# Patient Record
Sex: FEMALE | Race: WHITE | NOT HISPANIC OR LATINO | Employment: OTHER | URBAN - NONMETROPOLITAN AREA
[De-identification: names, ages, dates, MRNs, and addresses within clinical notes are randomized per-mention and may not be internally consistent; named-entity substitution may affect disease eponyms.]

---

## 2017-04-24 ENCOUNTER — TRANSFERRED RECORDS (OUTPATIENT)
Dept: HEALTH INFORMATION MANAGEMENT | Facility: HOSPITAL | Age: 77
End: 2017-04-24

## 2017-04-27 ENCOUNTER — OFFICE VISIT (OUTPATIENT)
Dept: SLEEP MEDICINE | Facility: HOSPITAL | Age: 77
End: 2017-04-27
Payer: MEDICARE

## 2017-04-27 DIAGNOSIS — R09.02 HYPOXIA: Primary | ICD-10-CM

## 2017-04-27 PROCEDURE — 94762 N-INVAS EAR/PLS OXIMTRY CONT: CPT

## 2017-04-27 NOTE — MR AVS SNAPSHOT
"              After Visit Summary   2017    Katya Alcala    MRN: [id number]           Patient Information     Date Of Birth          1940        Visit Information        Provider Department      2017 10:30 AM [facility]        Today's Diagnoses     Hypoxia    -  1       Follow-ups after your visit        Who to contact     If you have questions or need follow up information about today's clinic visit or your schedule please contact [facility] directly at [phone].  Normal or non-critical lab and imaging results will be communicated to you by [employer], letter or phone within 4 business days after the clinic has received the results. If you do not hear from us within 7 days, please contact the clinic through [employer] or phone. If you have a critical or abnormal lab result, we will notify you by phone as soon as possible.  Submit refill requests through [employer] or call your pharmacy and they will forward the refill request to us. Please allow 3 business days for your refill to be completed.          Additional Information About Your Visit        [employer]AL[employer] Information     [employer] lets you send messages to your doctor, view your test results, renew your prescriptions, schedule appointments and more. To sign up, go to [email or web address] . Click on \"Log in\" on the left side of the screen, which will take you to the Welcome page. Then click on \"Sign up Now\" on the right side of the page.     You will be asked to enter the access code listed below, as well as some personal information. Please follow the directions to create your username and password.     Your access code is: [email or web address]  Expires: 2017  9:33 AM     Your access code will  in 90 days. If you need help or a new code, please call your [place] clinic or [phone].        Care EveryWhere ID     This is your Care EveryWhere ID. This could be used by other organizations to access your [place] medical " records  [id number]         Blood Pressure from Last 3 Encounters:   08/08/16 152/71   02/08/14 (!) 118/46    Weight from Last 3 Encounters:   08/08/16 144 lb (65.3 kg)   02/07/14 145 lb 9.6 oz (66 kg)              Today, you had the following     No orders found for display       Primary Care Provider Office Phone # Fax #    Lokesh Taylor [phone]       [facility] [place] [address] [place] [id number]        Thank you!     Thank you for choosing [facility]  for your care. Our goal is always to provide you with excellent care. Hearing back from our patients is one way we can continue to improve our services. Please take a few minutes to complete the written survey that you may receive in the mail after your visit with us. Thank you!             Your Updated Medication List - Protect others around you: Learn how to safely use, store and throw away your medicines at www.disposemymeds.org.          This list is accurate as of: 4/27/17 11:59 PM.  Always use your most recent med list.                   Brand Name Dispense Instructions for use    ALLOPURINOL PO      Take 100 mg by mouth daily       AMLODIPINE BESYLATE PO      Take 10 mg by mouth daily       ASPIRIN PO      Take 81 mg by mouth       isosorbide mononitrate 30 MG 24 hr tablet    IMDUR     Take 30 mg by mouth daily       LISINOPRIL PO      Take 20 mg by mouth daily       NADOLOL PO      Take 80 mg by mouth daily       OMEGA-3 FISH OIL PO      Take 2 g by mouth daily       PRAVASTATIN SODIUM PO      Take 10 mg by mouth daily       PRILOSEC PO      Take 20 mg by mouth daily       TRAMADOL HCL PO      Take 50 mg by mouth daily

## 2017-04-27 NOTE — NURSING NOTE
Patient picked up over night oximeter number [id number]. Patient was instructed on use of device. Patient verbalized understanding.     Patient will return device tomorrow by: noon

## 2017-04-28 NOTE — NURSING NOTE
Patient returned the wrist ox and the report was printed and fax to Dr. Liu and Dr. Taylor and sent to scan

## 2017-10-19 ENCOUNTER — RADIANT APPOINTMENT (OUTPATIENT)
Dept: MAMMOGRAPHY | Facility: OTHER | Age: 77
End: 2017-10-19
Attending: FAMILY MEDICINE
Payer: MEDICARE

## 2017-10-19 DIAGNOSIS — Z12.39 BREAST SCREENING: ICD-10-CM

## 2017-10-19 PROCEDURE — G0202 SCR MAMMO BI INCL CAD: HCPCS | Mod: TC

## 2017-11-26 ENCOUNTER — APPOINTMENT (OUTPATIENT)
Dept: GENERAL RADIOLOGY | Facility: HOSPITAL | Age: 77
End: 2017-11-26
Attending: FAMILY MEDICINE
Payer: MEDICARE

## 2017-11-26 ENCOUNTER — HOSPITAL ENCOUNTER (EMERGENCY)
Facility: HOSPITAL | Age: 77
Discharge: HOME | End: 2017-11-26
Attending: FAMILY MEDICINE | Admitting: FAMILY MEDICINE
Payer: MEDICARE

## 2017-11-26 VITALS
RESPIRATION RATE: 16 BRPM | SYSTOLIC BLOOD PRESSURE: 129 MMHG | OXYGEN SATURATION: 97 % | DIASTOLIC BLOOD PRESSURE: 60 MMHG | TEMPERATURE: 98.8 F

## 2017-11-26 DIAGNOSIS — M25.531 RIGHT WRIST PAIN: ICD-10-CM

## 2017-11-26 DIAGNOSIS — S62.001A CLOSED NONDISPLACED FRACTURE OF SCAPHOID OF RIGHT WRIST, UNSPECIFIED PORTION OF SCAPHOID, INITIAL ENCOUNTER: ICD-10-CM

## 2017-11-26 LAB
BASOPHILS # BLD AUTO: 0.1 10E9/L (ref 0–0.2)
BASOPHILS NFR BLD AUTO: 0.6 %
CRP SERPL-MCNC: 13.5 MG/L (ref 0–8)
D DIMER PPP DDU-MCNC: 223 NG/ML D-DU (ref 0–300)
DIFFERENTIAL METHOD BLD: ABNORMAL
EOSINOPHIL # BLD AUTO: 0.4 10E9/L (ref 0–0.7)
EOSINOPHIL NFR BLD AUTO: 4.3 %
ERYTHROCYTE [DISTWIDTH] IN BLOOD BY AUTOMATED COUNT: 14.1 % (ref 10–15)
HCT VFR BLD AUTO: 28 % (ref 35–47)
HGB BLD-MCNC: 9 G/DL (ref 11.7–15.7)
IMM GRANULOCYTES # BLD: 0 10E9/L (ref 0–0.4)
IMM GRANULOCYTES NFR BLD: 0.2 %
LYMPHOCYTES # BLD AUTO: 2.3 10E9/L (ref 0.8–5.3)
LYMPHOCYTES NFR BLD AUTO: 27.2 %
MCH RBC QN AUTO: 27.5 PG (ref 26.5–33)
MCHC RBC AUTO-ENTMCNC: 32.1 G/DL (ref 31.5–36.5)
MCV RBC AUTO: 86 FL (ref 78–100)
MONOCYTES # BLD AUTO: 0.7 10E9/L (ref 0–1.3)
MONOCYTES NFR BLD AUTO: 8.1 %
NEUTROPHILS # BLD AUTO: 4.9 10E9/L (ref 1.6–8.3)
NEUTROPHILS NFR BLD AUTO: 59.6 %
NRBC # BLD AUTO: 0 10*3/UL
NRBC BLD AUTO-RTO: 0 /100
PLATELET # BLD AUTO: 204 10E9/L (ref 150–450)
RBC # BLD AUTO: 3.27 10E12/L (ref 3.8–5.2)
URATE SERPL-MCNC: 4.2 MG/DL (ref 2.6–6)
WBC # BLD AUTO: 8.3 10E9/L (ref 4–11)

## 2017-11-26 PROCEDURE — 36415 COLL VENOUS BLD VENIPUNCTURE: CPT | Performed by: FAMILY MEDICINE

## 2017-11-26 PROCEDURE — 99284 EMERGENCY DEPT VISIT MOD MDM: CPT

## 2017-11-26 PROCEDURE — 99284 EMERGENCY DEPT VISIT MOD MDM: CPT | Performed by: FAMILY MEDICINE

## 2017-11-26 PROCEDURE — 73070 X-RAY EXAM OF ELBOW: CPT | Mod: TC,RT

## 2017-11-26 PROCEDURE — 85379 FIBRIN DEGRADATION QUANT: CPT | Performed by: FAMILY MEDICINE

## 2017-11-26 PROCEDURE — 86140 C-REACTIVE PROTEIN: CPT | Performed by: FAMILY MEDICINE

## 2017-11-26 PROCEDURE — 25000132 ZZH RX MED GY IP 250 OP 250 PS 637: Mod: GY

## 2017-11-26 PROCEDURE — 73110 X-RAY EXAM OF WRIST: CPT | Mod: TC,RT

## 2017-11-26 PROCEDURE — 85025 COMPLETE CBC W/AUTO DIFF WBC: CPT | Performed by: FAMILY MEDICINE

## 2017-11-26 PROCEDURE — 84550 ASSAY OF BLOOD/URIC ACID: CPT | Performed by: FAMILY MEDICINE

## 2017-11-26 PROCEDURE — A9270 NON-COVERED ITEM OR SERVICE: HCPCS | Mod: GY

## 2017-11-26 RX ORDER — ACETAMINOPHEN 325 MG/1
TABLET ORAL
Status: COMPLETED
Start: 2017-11-26 | End: 2017-11-26

## 2017-11-26 RX ADMIN — ACETAMINOPHEN 975 MG: 325 TABLET, FILM COATED ORAL at 22:16

## 2017-11-26 ASSESSMENT — ENCOUNTER SYMPTOMS
DIAPHORESIS: 0
ALLERGIC/IMMUNOLOGIC NEGATIVE: 1
ACTIVITY CHANGE: 0
APPETITE CHANGE: 0
MUSCULOSKELETAL NEGATIVE: 1
CARDIOVASCULAR NEGATIVE: 1
EYES NEGATIVE: 1
RESPIRATORY NEGATIVE: 1
FATIGUE: 0
UNEXPECTED WEIGHT CHANGE: 0
ENDOCRINE NEGATIVE: 1
FEVER: 0
CHILLS: 0
NEUROLOGICAL NEGATIVE: 1

## 2017-11-26 NOTE — ED AVS SNAPSHOT
[facility]    [address]    [place] [address]    Phone:  [phone]                                       Katya Alcala   MRN: [id number]    Department:  [facility]   Date of Visit:  11/26/2017           Patient Information     Date Of Birth          1940        Your diagnoses for this visit were:     Right wrist pain     Closed nondisplaced fracture of scaphoid of right wrist, unspecified portion of scaphoid, initial encounter        You were seen by Carissa Villa MD.      Follow-up Information     Please follow up.    Why:  orthopedics next week for recheck          Review of your medicines      Our records show that you are taking the medicines listed below. If these are incorrect, please call your family doctor or clinic.        Dose / Directions Last dose taken    ALLOPURINOL PO   Dose:  100 mg        Take 100 mg by mouth daily   Refills:  0        isosorbide mononitrate 30 MG 24 hr tablet   Commonly known as:  IMDUR   Dose:  30 mg        Take 30 mg by mouth daily   Refills:  0        LISINOPRIL PO   Dose:  20 mg        Take 20 mg by mouth daily   Refills:  0        NADOLOL PO   Dose:  80 mg        Take 80 mg by mouth daily   Refills:  0        NORVASC PO   Dose:  10 mg        Take 10 mg by mouth daily   Refills:  0        OMEGA-3 FISH OIL PO   Dose:  2 g        Take 2 g by mouth daily   Refills:  0        PRAVASTATIN SODIUM PO   Dose:  20 mg        Take 20 mg by mouth daily   Refills:  0        PRILOSEC PO   Dose:  20 mg        Take 20 mg by mouth daily   Refills:  0        TRAMADOL HCL PO   Dose:  50 mg        Take 50 mg by mouth daily   Refills:  0        XARELTO PO   Dose:  20 mg        Take 20 mg by mouth   Refills:  0                Procedures and tests performed during your visit     CBC with platelets differential    CRP inflammation    D-Dimer ([facility],)    Elbow XR, 2 views, right    Sling    Splint    Uric acid    Wrist XR, G/E 3 views,  "right      Orders Needing Specimen Collection     None      Pending Results     Date and Time Order Name Status Description    2017 2212 Elbow XR, 2 views, right In process     2017 2149 Wrist XR, G/E 3 views, right In process             Pending Culture Results     No orders found from 2017 to 2017.            Thank you for choosing [facility]       Thank you for choosing [facility] for your care. Our goal is always to provide you with excellent care. Hearing back from our patients is one way we can continue to improve our services. Please take a few minutes to complete the written survey that you may receive in the mail after you visit with us. Thank you!        [employer] Information     [employer] lets you send messages to your doctor, view your test results, renew your prescriptions, schedule appointments and more. To sign up, go to [email or web address] . Click on \"Log in\" on the left side of the screen, which will take you to the Welcome page. Then click on \"Sign up Now\" on the right side of the page.     You will be asked to enter the access code listed below, as well as some personal information. Please follow the directions to create your username and password.     Your access code is: [email or web address]  Expires: 2018 11:27 PM     Your access code will  in 90 days. If you need help or a new code, please call your [facility] clinic or [phone].        Care EveryWhere ID     This is your Care EveryWhere ID. This could be used by other organizations to access your [facility] medical records  [id number]        Equal Access to Services     [facility] HospitalBRYAN : Hadii shanique johno Soroxana, waaxda luqadaha, qaybta kaalmada adepio, yolanda medellin . So [facility] [phone].    ATENCIÓN: Si habla español, tiene a pastrana disposición servicios gratuitos de asistencia lingüística. Llame al [phone].    We comply with applicable federal civil rights laws and [place] laws. " We do not discriminate on the basis of race, color, national origin, age, disability, sex, sexual orientation, or gender identity.            After Visit Summary       This is your record. Keep this with you and show to your community pharmacist(s) and doctor(s) at your next visit.

## 2017-11-26 NOTE — ED AVS SNAPSHOT
[facility]    [address] [address]    Phone:  [phone]                                       Katya Alcala   MRN: [id number]    Department:  [facility]   Date of Visit:  11/26/2017           After Visit Summary Signature Page     I have received my discharge instructions, and my questions have been answered. I have discussed any challenges I see with this plan with the nurse or doctor.    ..........................................................................................................................................  Patient/Patient Representative Signature      ..........................................................................................................................................  Patient Representative Print Name and Relationship to Patient    ..................................................               ................................................  Date                                            Time    ..........................................................................................................................................  Reviewed by Signature/Title    ...................................................              ..............................................  Date                                                            Time

## 2017-11-27 NOTE — ED PROVIDER NOTES
"  History     Chief Complaint   Patient presents with     Arm Pain     pain and swelling to right arm from elbow down. denies injury. x3 days. CMS intact. denies numbness/tingling. states \"it just throbs\" hx of gout but states \"it doesn't really feel like gout.\" warmth noted. recently placed on xarelto     HPI  Katya Alcala is a 77 year old female who presents with right arm pain . States pain started 2 days ago during the night. By the time she woke up in the AM it has been achy and painful. Also swelling. NO known injury. History of gout but does not feel like gout. Denies chills NO fever. No other joints involved.  Recently started on xarelto for atrial fibrillation . Patient states was started in April last time she was down at [place]     Problem List:    Patient Active Problem List    Diagnosis Date Noted     Hemorrhage of gastrointestinal tract 02/07/2014     Priority: Medium     Problem list name updated by automated process. 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 Provider to review       Lower GI bleeding 02/07/2014     Priority: Medium        Past Medical History:    Past Medical History:   Diagnosis Date     Acute upper respiratory infections of unspecified 11/21/1999       Past Surgical History:    No past surgical history on file.    Family History:    No family history on file.    Social History:  Marital Status:   [5]  Social History   Substance Use Topics     Smoking status: Never Smoker     Smokeless tobacco: Never Used     Alcohol use No        Medications:      ASPIRIN PO   Omega-3 Fatty Acids (OMEGA-3 FISH OIL PO)   Omeprazole (PRILOSEC PO)   AMLODIPINE BESYLATE PO   isosorbide mononitrate (IMDUR) 30 MG 24 hr tablet   ALLOPURINOL PO   NADOLOL PO   LISINOPRIL PO   TRAMADOL HCL PO   PRAVASTATIN SODIUM PO "         Review of Systems   Constitutional: Negative for activity change, appetite change, chills, diaphoresis, fatigue, fever and unexpected weight change.   HENT: Negative.    Eyes: Negative.    Respiratory: Negative.    Cardiovascular: Negative.    Endocrine: Negative.    Genitourinary: Negative.    Musculoskeletal: Negative.         Right wrist pain and swelling radiating to elbow    Allergic/Immunologic: Negative.    Neurological: Negative.    All other systems reviewed and are negative.      Physical Exam   BP: 129/60  Heart Rate: 91  Temp: 98.8  F (37.1  C)  Resp: 16  SpO2: 97 %      Physical Exam   Constitutional: She is oriented to person, place, and time. She appears well-developed and well-nourished.   HENT:   Head: Normocephalic and atraumatic.   Eyes: Pupils are equal, round, and reactive to light.   Cardiovascular: Normal rate and regular rhythm.    Pulmonary/Chest: Breath sounds normal.   Abdominal: Soft.   Musculoskeletal:   Swollen right wrist with tenderness at distal radius , ulna and anatomic snuff box. CMS normal    NO bruising .    Neurological: She is alert and oriented to person, place, and time.   Vitals reviewed.      ED Course     ED Course     Procedures          Patient arrived to ED ambulatory . Patient triaged to exam room. Medical records reviewed History and exam completed. Labs and diagnostics ordered. Xray wit advanced degenerative changes.. Questionable lucency mid scaphoid cannot exclude fracture. Labs reviewed Uric acid normal , white count reassuring and only slight elevation of CRP not consistent with joint infection. Discussed patient with orthopedics. Patient will be treated with thumb spica splint, sling , ice , elevation . Patient will follow up with orthopedics within next several days at which time CT or MRI will be ordered as indicated          Labs Ordered and Resulted from Time of ED Arrival Up to the Time of Departure from the ED - No data to display    Assessments &  Plan (with Medical Decision Making)     I have reviewed the nursing notes.    I have reviewed the findings, diagnosis, plan and need for follow up with the patient.      New Prescriptions    No medications on file       Final diagnoses:   Right wrist pain   Closed nondisplaced fracture of scaphoid of right wrist, unspecified portion of scaphoid, initial encounter       11/26/2017   [facility]     Carissa Villa MD  11/27/17 [id number]

## 2017-11-27 NOTE — ED NOTES
Pt given tylenol for pain per request.  Xray completed.  Pt tolerated fair, large amount of pain with movements.

## 2017-11-27 NOTE — ED NOTES
Chart opened on request of pt. Pt stated that she needed and MRI of arm but when called MRI there is no order. Note left for Dr Fitzpatrick.

## 2017-11-27 NOTE — ED NOTES
Pt comes in with family with report of R wrist, arm, hand pain.  Swelling noted to area.  Pt denies any noted injury.  Pt first noted symptoms 2 days ago.  Pt able to ambulate to room 11.  Assisted into gown. MD-Dr Fitzpatrick in to see pt.  Pt started on xarelto in April when at [facility].

## 2017-11-27 NOTE — ED NOTES
Face to face report given with opportunity to observe patient.    Report given to Xena Morales   11/26/2017  11:08 PM

## 2017-11-28 ENCOUNTER — OFFICE VISIT (OUTPATIENT)
Dept: ORTHOPEDICS | Facility: OTHER | Age: 77
End: 2017-11-28
Attending: ORTHOPAEDIC SURGERY
Payer: MEDICARE

## 2017-11-28 VITALS
OXYGEN SATURATION: 98 % | HEART RATE: 84 BPM | DIASTOLIC BLOOD PRESSURE: 78 MMHG | HEIGHT: 62 IN | WEIGHT: 138 LBS | TEMPERATURE: 97.7 F | BODY MASS INDEX: 25.4 KG/M2 | SYSTOLIC BLOOD PRESSURE: 128 MMHG | RESPIRATION RATE: 18 BRPM

## 2017-11-28 DIAGNOSIS — M25.531 RIGHT WRIST PAIN: ICD-10-CM

## 2017-11-28 DIAGNOSIS — M25.521 RIGHT ELBOW PAIN: Primary | ICD-10-CM

## 2017-11-28 PROCEDURE — 29105 APPLICATION LONG ARM SPLINT: CPT | Performed by: ORTHOPAEDIC SURGERY

## 2017-11-28 PROCEDURE — 29105 APPLICATION LONG ARM SPLINT: CPT

## 2017-11-28 PROCEDURE — 99213 OFFICE O/P EST LOW 20 MIN: CPT

## 2017-11-28 PROCEDURE — 99203 OFFICE O/P NEW LOW 30 MIN: CPT | Mod: 25 | Performed by: ORTHOPAEDIC SURGERY

## 2017-11-28 RX ORDER — ERGOCALCIFEROL 1.25 MG/1
50000 CAPSULE ORAL
Status: ON HOLD | COMMUNITY
Start: 2017-06-22 | End: 2018-09-10

## 2017-11-28 RX ORDER — NITROGLYCERIN 0.4 MG/1
TABLET SUBLINGUAL
COMMUNITY
Start: 2012-09-26

## 2017-11-28 RX ORDER — METHYLPREDNISOLONE 4 MG
TABLET, DOSE PACK ORAL
Qty: 21 TABLET | Refills: 0 | Status: SHIPPED | OUTPATIENT
Start: 2017-11-28 | End: 2018-07-13

## 2017-11-28 ASSESSMENT — PAIN SCALES - GENERAL: PAINLEVEL: MODERATE PAIN (5)

## 2017-11-28 NOTE — PROGRESS NOTES
New Patient Visit    Chief Complaint: Right wrist and elbow pain    Patient Profile: 77-year-old right-handed nonsmoking retired patient of Dr. Taylor.     History of Present Illness/Injury: Katya developed right ulnar wrist and lateral elbow pain 4-5 days ago without antecedent trauma or overuse.  2-3 weeks before that she had fallen but had no symptoms thereafter.  She presented to the ED on 11/26/17.  X-rays were taken.  It was thought that a wrist fracture was present.  She was placed in a wrist splint and a sling and is here today in follow-up.  The splint she was given in the ED was too uncomfortable so she bought a different one at [facility].  She denies numbness or tingling in the right hand.  At present her pain is constant dull grade 5.  It is worse with wrist or elbow motion.  It feels best when she doesn't move it.  She has a remote history of gout which, among other joints, has affected her right hand in the past.    A review of the patient's complete medical/family/social  history, medications, allergies and a two (neurological and musculoskeletal) system review of systems are noncontributory for the presenting problem except that she has a history of fibromyalgia, gout for which she is on allopurinol, and atrial fibrillation for which she is on Xarelto and therefore cannot take NSAIDs.  In the past she did not tolerate a course of oral steroids as it made her agitated and prevented her from sleeping.  She does not know which steroid it was.  It was prescribed for acute gout.    Examination: Elderly female in no obvious distress.  vital signs stable and afebrile.  Head atraumatic.  Neck supple.  Respiratory efforts unlabored. Her right wrist was in a removable wrist splints and her arm was in a sling which was missing one of the rings for the next strap.  The skin in the entire right upper extremity was intact and free of erythema or ecchymosis.  There is no effusion of the right elbow or wrist.   Range of motion of both joints was limited by pain.  The right elbow is tender to palpation over the conjoint tendon and lateral epicondyle.  There is no tenderness in the forearm.  The left wrist is tender over the ulnocarpal joint as well as over the ECU and FCU.  The neurovascular status of the fingers of the right hand is intact.    X-ray; plain films of the right elbow and wrist taken on 12/22/17 were reviewed.  There are degenerative changes at the wrist with calcific densities consistent with either calcium pyrophosphate deposition disease or chronic    Impression:  #1.  Right elbow and wrist acute inflammation question gout versus pseudogout versus tendinitis  #2.  NSAID contraindication: Anticoagulation    Plan: I recommended a period of immobilization and a trial of a Medrol dose pack.  We will avoid a longer course of oral steroids because of her history of intolerance.  I suggested she obtain OTC Benadryl and take that as a sleep aid if the steroid makes it hard to fall asleep. If she cannot tolerate the Medrol, she should stop it.    A long-arm posterior plaster splint was applied.  A new sling was applied.  She will return in a week for a recheck.  Medrol Dosepak was prescribed and she is given instruction on how to take it.

## 2017-11-28 NOTE — NURSING NOTE
"Chief Complaint   Patient presents with     Musculoskeletal Problem     Right wrist pain saw in ER 11/26/2017       Initial /78 (BP Location: Right arm, Patient Position: Sitting, Cuff Size: Adult Large)  Pulse 84  Temp 97.7  F (36.5  C) (Tympanic)  Resp 18  Ht 5' 1.5\" (1.562 m)  Wt 138 lb (62.6 kg)  SpO2 98%  BMI 25.65 kg/m2 Estimated body mass index is 25.65 kg/(m^2) as calculated from the following:    Height as of this encounter: 5' 1.5\" (1.562 m).    Weight as of this encounter: 138 lb (62.6 kg).  Medication Reconciliation: complete   Nataly Whitney LPN      "

## 2017-11-28 NOTE — MR AVS SNAPSHOT
"              After Visit Summary   11/28/2017    Katya Alcala    MRN: [id number]           Patient Information     Date Of Birth          1940        Visit Information        Provider Department      11/28/2017 2:00 PM Jimbo Neves MD  ORTHOPEDICS        Today's Diagnoses     Right elbow pain    -  1    Right wrist pain           Follow-ups after your visit        Follow-up notes from your care team     Return in about 1 week (around 12/5/2017).      Your next 10 appointments already scheduled     Dec 05, 2017  1:40 PM CST   (Arrive by 1:20 PM)   Return Visit with Jimbo Neves MD    ORTHOPEDICS ([facility] )    [address] [facility] [address] [phone]              Who to contact     If you have questions or need follow up information about today's clinic visit or your schedule please contact  ORTHOPEDICS directly at [phone].  Normal or non-critical lab and imaging results will be communicated to you by MyChart, letter or phone within 4 business days after the clinic has received the results. If you do not hear from us within 7 days, please contact the clinic through [employer] or phone. If you have a critical or abnormal lab result, we will notify you by phone as soon as possible.  Submit refill requests through "[employer] or call your pharmacy and they will forward the refill request to us. Please allow 3 business days for your refill to be completed.          Additional Information About Your Visit        [employer] Information     "[employer] lets you send messages to your doctor, view your test results, renew your prescriptions, schedule appointments and more. To sign up, go to [email or web address] . Click on \"Log in\" on the left side of the screen, which will take you to the Welcome page. Then click on \"Sign up Now\" on the right side of the page.     You will be asked to enter the access code listed below, as well as some personal information. Please " "follow the directions to create your username and password.     Your access code is: [email or web address]  Expires: 2018 11:27 PM     Your access code will  in 90 days. If you need help or a new code, please call your [facility] clinic or [phone].        Care EveryWhere ID     This is your Care EveryWhere ID. This could be used by other organizations to access your [facility] medical records  [id number]        Your Vitals Were     Pulse Temperature Respirations Height Pulse Oximetry BMI (Body Mass Index)    84 97.7  F (36.5  C) (Tympanic) 18 5' 1.5\" (1.562 m) 98% 25.65 kg/m2       Blood Pressure from Last 3 Encounters:   17 128/78   17 129/60   16 152/71    Weight from Last 3 Encounters:   17 138 lb (62.6 kg)   16 144 lb (65.3 kg)   14 145 lb 9.6 oz (66 kg)              Today, you had the following     No orders found for display         Today's Medication Changes          These changes are accurate as of: 17  3:06 PM.  If you have any questions, ask your nurse or doctor.               Start taking these medicines.        Dose/Directions    methylPREDNISolone 4 MG tablet   Commonly known as:  MEDROL DOSEPAK   Used for:  Right elbow pain, Right wrist pain   Started by:  Jimbo Neves MD        Follow package instructions   Quantity:  21 tablet   Refills:  0            Where to get your medicines      These medications were sent to [facility] [place] [id number]     Phone:  [phone]     methylPREDNISolone 4 MG tablet                Primary Care Provider Office Phone # Fax #    Lokesh Taylor [phone]       [address] [address]        Equal Access to Services     [facility]: Niki Farr, anoop lupolly, qaybyolanda vale. So [facility] [place] [facility] [phone].    ATENCIÓN: Si " joan kaiser, tiene a pastrana disposición servicios gratuitos de asistencia lingüística. Demarco mcginnis [phone].    We comply with applicable federal civil rights laws and [place] laws. We do not discriminate on the basis of race, color, national origin, age, disability, sex, sexual orientation, or gender identity.            Thank you!     Thank you for choosing  ORTHOPEDICS  for your care. Our goal is always to provide you with excellent care. Hearing back from our patients is one way we can continue to improve our services. Please take a few minutes to complete the written survey that you may receive in the mail after your visit with us. Thank you!             Your Updated Medication List - Protect others around you: Learn how to safely use, store and throw away your medicines at www.disposemymeds.org.          This list is accurate as of: 11/28/17  3:06 PM.  Always use your most recent med list.                   Brand Name Dispense Instructions for use Diagnosis    ALLOPURINOL PO      Take 100 mg by mouth daily        ergocalciferol 10443 UNITS capsule    ERGOCALCIFEROL     Take 50,000 Units by mouth        isosorbide mononitrate 30 MG 24 hr tablet    IMDUR     Take 30 mg by mouth daily        LISINOPRIL PO      Take 20 mg by mouth daily        methylPREDNISolone 4 MG tablet    MEDROL DOSEPAK    21 tablet    Follow package instructions    Right elbow pain, Right wrist pain       NADOLOL PO      Take 80 mg by mouth daily        nitroGLYcerin 0.4 MG sublingual tablet    NITROSTAT     Do not crush. Dissolve 1 tablet under the tongue at 1st sign of angina. Repeat in 5 minutes till relief. Max of 3 tabs/15 minutes.        NORVASC PO      Take 10 mg by mouth daily        OMEGA-3 FISH OIL PO      Take 2 g by mouth daily        PRAVASTATIN SODIUM PO      Take 20 mg by mouth daily        PRILOSEC PO      Take 20 mg by mouth daily        TRAMADOL HCL PO      Take 50 mg by mouth daily        XARELTO PO      Take 20 mg by  mouth

## 2017-11-28 NOTE — ED NOTES
"Chart opened per pt's request. Pt informed MRI was not ordered by Dr Fitzpatrick. Pt stated what she needs is a Ortho referral \"they won't let me make an appointment without one- I tried today. Dr Fitzpatrick stated referral in computer. On audit no referral seen. Dr Fitzpatrick notified.  "

## 2017-12-05 ENCOUNTER — OFFICE VISIT (OUTPATIENT)
Dept: ORTHOPEDICS | Facility: OTHER | Age: 77
End: 2017-12-05
Attending: ORTHOPAEDIC SURGERY
Payer: COMMERCIAL

## 2017-12-05 VITALS
OXYGEN SATURATION: 98 % | TEMPERATURE: 98.4 F | HEART RATE: 86 BPM | BODY MASS INDEX: 25.65 KG/M2 | WEIGHT: 138 LBS | SYSTOLIC BLOOD PRESSURE: 96 MMHG | DIASTOLIC BLOOD PRESSURE: 52 MMHG

## 2017-12-05 DIAGNOSIS — M77.01 MEDIAL EPICONDYLITIS, RIGHT ELBOW: Primary | ICD-10-CM

## 2017-12-05 DIAGNOSIS — M77.8 TENDINITIS OF RIGHT WRIST: ICD-10-CM

## 2017-12-05 PROCEDURE — 99213 OFFICE O/P EST LOW 20 MIN: CPT | Performed by: ORTHOPAEDIC SURGERY

## 2017-12-05 PROCEDURE — 99212 OFFICE O/P EST SF 10 MIN: CPT

## 2017-12-05 RX ORDER — METHYLPREDNISOLONE 4 MG
TABLET, DOSE PACK ORAL
Qty: 21 TABLET | Refills: 0 | Status: SHIPPED | OUTPATIENT
Start: 2017-12-05 | End: 2018-09-09

## 2017-12-05 ASSESSMENT — PAIN SCALES - GENERAL: PAINLEVEL: MODERATE PAIN (5)

## 2017-12-05 NOTE — MR AVS SNAPSHOT
"              After Visit Summary   12/5/2017    Katya Alcala    MRN: [id number]           Patient Information     Date Of Birth          1940        Visit Information        Provider Department      12/5/2017 1:40 PM Jimbo Neves MD  ORTHOPEDICS        Today's Diagnoses     Medial epicondylitis, right elbow    -  1    Tendinitis of right wrist           Follow-ups after your visit        Additional Services     PHYSICAL THERAPY REFERRAL       *This therapy referral will be filtered to a centralized scheduling office at [facility] and the patient will receive a call to schedule an appointment at a [facility] location most convenient for them. *     [facility] provides Physical Therapy evaluation and treatment and many specialty services across the [facility] system.  If requesting a specialty program, please choose from the list below.    If you have not heard from the scheduling office within 2 business days, please call [phone] for all locations, with the exception of [place], please call [phone].  Treatment: Evaluation & Treatment  Special Instructions/Modalities: Evaluate and treat right elbow and wrist  Special Programs: Please include phonophoresis  Patient chooses [facility] in [place]    Please be aware that coverage of these services is subject to the terms and limitations of your health insurance plan.  Call member services at your health plan with any benefit or coverage questions.      **Note to Provider:  If you are referring outside of [facility] for the therapy appointment, please list the name of the location in the \"special instructions\" above, print the referral and give to the patient to schedule the appointment.                  Follow-up notes from your care team     Return in about 1 month (around 1/5/2018).      Your next 10 appointments already scheduled     Jan 03, 2018  2:00 PM CST   (Arrive by 1:45 PM)   Return Visit with Jimbo MCCORMACK " "MD Edinson    ORTHOPEDICS ([facility] )    [address]  [facility] [address] [phone]              Who to contact     If you have questions or need follow up information about today's clinic visit or your schedule please contact  ORTHOPEDICS directly at [phone].  Normal or non-critical lab and imaging results will be communicated to you by MyChart, letter or phone within 4 business days after the clinic has received the results. If you do not hear from us within 7 days, please contact the clinic through MyChart or phone. If you have a critical or abnormal lab result, we will notify you by phone as soon as possible.  Submit refill requests through [employer] or call your pharmacy and they will forward the refill request to us. Please allow 3 business days for your refill to be completed.          Additional Information About Your Visit        Who What [employer] Information     [employer] lets you send messages to your doctor, view your test results, renew your prescriptions, schedule appointments and more. To sign up, go to [email or web address] . Click on \"Log in\" on the left side of the screen, which will take you to the Welcome page. Then click on \"Sign up Now\" on the right side of the page.     You will be asked to enter the access code listed below, as well as some personal information. Please follow the directions to create your username and password.     Your access code is: [email or web address]  Expires: 2018 11:27 PM     Your access code will  in 90 days. If you need help or a new code, please call your [place] clinic or [phone].        Care EveryWhere ID     This is your Care EveryWhere ID. This could be used by other organizations to access your [facility] medical records  [id number]        Your Vitals Were     Pulse Temperature Pulse Oximetry BMI (Body Mass Index)          86 98.4  F (36.9  C) (Tympanic) 98% 25.65 kg/m2         Blood Pressure from Last 3 Encounters: "   12/05/17 96/52   11/28/17 128/78   11/26/17 129/60    Weight from Last 3 Encounters:   12/05/17 138 lb (62.6 kg)   11/28/17 138 lb (62.6 kg)   08/08/16 144 lb (65.3 kg)              We Performed the Following     PHYSICAL THERAPY REFERRAL          Today's Medication Changes          These changes are accurate as of: 12/5/17  1:55 PM.  If you have any questions, ask your nurse or doctor.               Start taking these medicines.        Dose/Directions    diclofenac 1 % Gel topical gel   Commonly known as:  VOLTAREN   Used for:  Medial epicondylitis, right elbow, Tendinitis of right wrist   Started by:  Jimbo Neves MD        Apply 1 gram to elbow and 1 gram gram to wrist four times daily using enclosed dosing card.   Quantity:  100 g   Refills:  1         These medicines have changed or have updated prescriptions.        Dose/Directions    * methylPREDNISolone 4 MG tablet   Commonly known as:  MEDROL DOSEPAK   This may have changed:  Another medication with the same name was added. Make sure you understand how and when to take each.   Used for:  Right elbow pain, Right wrist pain   Changed by:  Jimbo Neves MD        Follow package instructions   Quantity:  21 tablet   Refills:  0       * methylPREDNISolone 4 MG tablet   Commonly known as:  MEDROL DOSEPAK   This may have changed:  You were already taking a medication with the same name, and this prescription was added. Make sure you understand how and when to take each.   Used for:  Medial epicondylitis, right elbow, Tendinitis of right wrist   Changed by:  Jimbo Neves MD        Follow package instructions   Quantity:  21 tablet   Refills:  0       * Notice:  This list has 2 medication(s) that are the same as other medications prescribed for you. Read the directions carefully, and ask your doctor or other care provider to review them with you.         Where to get your medicines      These medications were sent to [facility]  [place] - [address]  [address], [facility] [address]     Phone:  [phone]     diclofenac 1 % Gel topical gel    methylPREDNISolone 4 MG tablet                Primary Care Provider Office Phone # Fax #    Lokesh JOAQUIN Taylor [phone]       [facility] [place] [address]  [facility] [address]        Equal Access to Services     [facility] : Hadii aad ku hadasho Soomaali, waaxda luqadaha, qaybta kaalmada adeegyada, waxay idiin hayaan adetriston bolanossh laMirthaaan ah. So [facility] [phone].    ATENCIÓN: Si joan kaiser, tiene a pastrana disposición servicios gratuitos de asistencia lingüística. [facility] [phone].    We comply with applicable federal civil rights laws and [place] laws. We do not discriminate on the basis of race, color, national origin, age, disability, sex, sexual orientation, or gender identity.            Thank you!     Thank you for choosing  ORTHOPEDICS  for your care. Our goal is always to provide you with excellent care. Hearing back from our patients is one way we can continue to improve our services. Please take a few minutes to complete the written survey that you may receive in the mail after your visit with us. Thank you!             Your Updated Medication List - Protect others around you: Learn how to safely use, store and throw away your medicines at www.disposemymeds.org.          This list is accurate as of: 12/5/17  1:55 PM.  Always use your most recent med list.                   Brand Name Dispense Instructions for use Diagnosis    ALLOPURINOL PO      Take 100 mg by mouth daily        diclofenac 1 % Gel topical gel    VOLTAREN    100 g    Apply 1 gram to elbow and 1 gram gram to wrist four times daily using enclosed dosing card.    Medial epicondylitis, right elbow, Tendinitis of right wrist       ergocalciferol 24488 UNITS capsule    ERGOCALCIFEROL     Take 50,000 Units by mouth        isosorbide mononitrate 30 MG 24 hr tablet    IMDUR      Take 30 mg by mouth daily        LISINOPRIL PO      Take 20 mg by mouth daily        * methylPREDNISolone 4 MG tablet    MEDROL DOSEPAK    21 tablet    Follow package instructions    Right elbow pain, Right wrist pain       * methylPREDNISolone 4 MG tablet    MEDROL DOSEPAK    21 tablet    Follow package instructions    Medial epicondylitis, right elbow, Tendinitis of right wrist       NADOLOL PO      Take 80 mg by mouth daily        nitroGLYcerin 0.4 MG sublingual tablet    NITROSTAT     Do not crush. Dissolve 1 tablet under the tongue at 1st sign of angina. Repeat in 5 minutes till relief. Max of 3 tabs/15 minutes.        NORVASC PO      Take 10 mg by mouth daily        OMEGA-3 FISH OIL PO      Take 2 g by mouth daily        PRAVASTATIN SODIUM PO      Take 20 mg by mouth daily        PRILOSEC PO      Take 20 mg by mouth daily        TRAMADOL HCL PO      Take 50 mg by mouth daily        XARELTO PO      Take 20 mg by mouth        * Notice:  This list has 2 medication(s) that are the same as other medications prescribed for you. Read the directions carefully, and ask your doctor or other care provider to review them with you.

## 2017-12-05 NOTE — NURSING NOTE
"Chief Complaint   Patient presents with     RECHECK     Right wrist/Right elbow       Initial BP 96/52 (BP Location: Left arm, Patient Position: Sitting, Cuff Size: Adult Regular)  Pulse 86  Temp 98.4  F (36.9  C) (Tympanic)  Wt 138 lb (62.6 kg)  SpO2 98%  BMI 25.65 kg/m2 Estimated body mass index is 25.65 kg/(m^2) as calculated from the following:    Height as of 11/28/17: 5' 1.5\" (1.562 m).    Weight as of this encounter: 138 lb (62.6 kg).  Medication Reconciliation: complete   Flores Kelley      "

## 2017-12-05 NOTE — PROGRESS NOTES
"Chief complaint:   #1.  Right wrist and elbow acute inflammation question gout versus pseudogout versus tendinitis  #2.  Oral NSAID contraindication: Xarelto    Patient Rrofile: 77 showed right-handed nonsmoking retired patient of Dr. Taylor with a history of gout.    HPI: Katya insidiously developed acute right ulnar wrist and lateral elbow pain on or about 11/23/17.  She presented to the ED on 11/26/17 where x-rays were taken.  The ED provider was suspicious of a fracture so she was splinted and followed up here on 11/28/17.  The radiologist later read the films as showing no fracture, but chondrocalcinosis was noted at the CC.  I found her to be exquisitely tender over the ulnocarpal joint and over the conjoint tendon at the lateral epicondyle of the elbow.  I immobilized her in a posterior plaster splint and a sling, and placed her on a Medrol Dosepak.    Subjective: Today, while still in the splint, she reported that her arm feels \"much better\".  Upon removal of the splint she said she felt pain in the ulnar side of the wrist and on the medial side of the elbow, but neither of these pains were as sharp or severe as they were last week.  She denies numbness or tingling in that hand.  She tolerated the Medrol well despite a history of not tolerating an unnamed oral steroid in the past.  She finished it yesterday.    Nothing else is changed with respect to her musculoskeletal or neurologic review of systems since seen last.    Examination: Elderly female in no obvious distress.  The skin in the right upper extremity is intact.  There is no swelling of the right wrist or elbow.  Today her elbow is exquisitely tender over the medial epicondyle of the distal humerus, not the lateral epicondyle where she was tender a week ago.  The right wrist is nontender to palpation.  Range of motion in both joints is limited by post-immobilization stiffness.  The neurovascular status of the fingers of the right hand is " intact.    Impression:  #1.  Resolution of lateral epicondylitis right elbow  #2.  Medial epicondylitis right elbow  #3.  Ulnar sided right wrist pain, question gout versus pseudogout versus tendinitis  #4.  Oral NSAID contraindication: Xarelto    Plan: I recommended a steroid injection to the medial epicondyle.  She refused as she does not like shots.  As an alternative I recommended another round of Medrol Dosepak, topical Voltaren gel and a referral to physical therapy.  She agreed and chose [facility] in [place].  She was placed back in her sling from which she is to wean herself over the next several days.  She will return in a month for recheck.

## 2017-12-11 ENCOUNTER — TRANSFERRED RECORDS (OUTPATIENT)
Dept: HEALTH INFORMATION MANAGEMENT | Facility: HOSPITAL | Age: 77
End: 2017-12-11

## 2017-12-28 ENCOUNTER — TRANSFERRED RECORDS (OUTPATIENT)
Dept: HEALTH INFORMATION MANAGEMENT | Facility: HOSPITAL | Age: 77
End: 2017-12-28

## 2018-01-03 ENCOUNTER — OFFICE VISIT (OUTPATIENT)
Dept: ORTHOPEDICS | Facility: OTHER | Age: 78
End: 2018-01-03
Attending: ORTHOPAEDIC SURGERY
Payer: MEDICARE

## 2018-01-03 VITALS
TEMPERATURE: 97.3 F | WEIGHT: 138 LBS | DIASTOLIC BLOOD PRESSURE: 62 MMHG | HEIGHT: 62 IN | OXYGEN SATURATION: 98 % | SYSTOLIC BLOOD PRESSURE: 100 MMHG | BODY MASS INDEX: 25.4 KG/M2 | HEART RATE: 90 BPM | RESPIRATION RATE: 18 BRPM

## 2018-01-03 DIAGNOSIS — M77.01 MEDIAL EPICONDYLITIS, RIGHT ELBOW: Primary | ICD-10-CM

## 2018-01-03 DIAGNOSIS — M77.8 TENDINITIS OF RIGHT WRIST: ICD-10-CM

## 2018-01-03 PROCEDURE — G0463 HOSPITAL OUTPT CLINIC VISIT: HCPCS

## 2018-01-03 PROCEDURE — 99212 OFFICE O/P EST SF 10 MIN: CPT | Performed by: ORTHOPAEDIC SURGERY

## 2018-01-03 ASSESSMENT — PAIN SCALES - GENERAL: PAINLEVEL: MODERATE PAIN (5)

## 2018-01-03 NOTE — NURSING NOTE
"Chief Complaint   Patient presents with     Musculoskeletal Problem     Follow up Right arm        Initial /62 (BP Location: Left arm, Patient Position: Sitting, Cuff Size: Adult Large)  Pulse 90  Temp 97.3  F (36.3  C) (Tympanic)  Resp 18  Ht 5' 2\" (1.575 m)  Wt 138 lb (62.6 kg)  SpO2 98%  BMI 25.24 kg/m2 Estimated body mass index is 25.24 kg/(m^2) as calculated from the following:    Height as of this encounter: 5' 2\" (1.575 m).    Weight as of this encounter: 138 lb (62.6 kg).  Medication Reconciliation: complete   Nataly Whitney LPN      "

## 2018-01-03 NOTE — PROGRESS NOTES
Chief complaint:  #1.  Right elbow medial epicondylitis  #2.  Ulnar sided right wrist pain: question gout versus pseudogout versus tendinitis  #3.  Oral NSAID contraindication: Xarelto    Patient profile: 77-year-old right-handed nonsmoking retired patient of Dr. Taylor with a history of gout.    HPI: She developed acute right ulnar wrist and lateral elbow pain on or about 11/23/17.  She presented to the ED on 11/26/17 where x-rays were taken.  No fractures were present but chondrocalcinosis was noted at the [facility].  She presented to me on 11/28/17.  She was placed on a Medrol Dosepak in her right upper extremity was immobilized in a plaster splint and a sling as she was exquisitely tender over the lateral epicondyles of the elbow and the ulnar side of the wrist.  The splint was removed on 12/5/17.  Her lateral elbow pain had resolved but she had new medial epicondylar pain and still had ulnar-sided wrist pain.  She was placed on a 2nd Medrol Dosepak, prescribed topical Voltaren, and referred to physical therapy which she has been doing in [place].    Subjective: Today she reports that her medial right elbow pain is almost gone.  In addition her wrist pain is about 40% of what it was.  She still has ulnar-sided right wrist pain with pronation and supination.  Her therapist wants to keep working with her.    Nothing else has changed with respect to her musculoskeletal or neurologic review of systems since seen last.    Examination: Elderly female in no obvious distress.  There is no swelling or deformity of the right elbow or wrist.  Her elbow is nontender over the lateral epicondyle, minimally tender over the medial epicondyle, and mildly tender over the DRUJ and TFCC of the right wrist.  The wrist pain is aggravated by active or passive pronation and supination, but not by active or passive wrist dorsiflexion, palmar flexion, ulnar deviation, or radial deviation.  The neurovascular status of the hand is  intact.    Impression: Same as chief complaints    Plan: She'll continue PT and return in a month for her next check.  She'll continue using topical Voltaren as needed.

## 2018-01-03 NOTE — MR AVS SNAPSHOT
"              After Visit Summary   1/3/2018    Katya Alcala    MRN: [id number]           Patient Information     Date Of Birth          1940        Visit Information        Provider Department      1/3/2018 2:00 PM Jimbo Neves MD  ORTHOPEDICS        Today's Diagnoses     Medial epicondylitis, right elbow    -  1    Tendinitis of right wrist           Follow-ups after your visit        Follow-up notes from your care team     Return in about 1 month (around 2/3/2018).      Your next 10 appointments already scheduled     Jan 31, 2018  1:00 PM CST   (Arrive by 12:45 PM)   Return Visit with Jimbo Neves MD    ORTHOPEDICS ([facility] )    [address] [facility] [address] [phone]              Who to contact     If you have questions or need follow up information about today's clinic visit or your schedule please contact  ORTHOPEDICS directly at [phone].  Normal or non-critical lab and imaging results will be communicated to you by MyChart, letter or phone within 4 business days after the clinic has received the results. If you do not hear from us within 7 days, please contact the clinic through [employer] or phone. If you have a critical or abnormal lab result, we will notify you by phone as soon as possible.  Submit refill requests through [employer] or call your pharmacy and they will forward the refill request to us. Please allow 3 business days for your refill to be completed.          Additional Information About Your Visit        MyChart Information     [employer] lets you send messages to your doctor, view your test results, renew your prescriptions, schedule appointments and more. To sign up, go to [email or web address] . Click on \"Log in\" on the left side of the screen, which will take you to the Welcome page. Then click on \"Sign up Now\" on the right side of the page.     You will be asked to enter the access code listed below, as well as some personal " "information. Please follow the directions to create your username and password.     Your access code is: [email or web address]  Expires: 2018 11:27 PM     Your access code will  in 90 days. If you need help or a new code, please call your [place] clinic or [phone].        Care EveryWhere ID     This is your Care EveryWhere ID. This could be used by other organizations to access your [place] medical records  [id number]        Your Vitals Were     Pulse Temperature Respirations Height Pulse Oximetry BMI (Body Mass Index)    90 97.3  F (36.3  C) (Tympanic) 18 5' 2\" (1.575 m) 98% 25.24 kg/m2       Blood Pressure from Last 3 Encounters:   18 100/62   17 96/52   17 128/78    Weight from Last 3 Encounters:   18 138 lb (62.6 kg)   17 138 lb (62.6 kg)   17 138 lb (62.6 kg)              Today, you had the following     No orders found for display       Primary Care Provider Office Phone # Fax #    Lokesh JOAQUIN Taylor [phone]       [place] [address] [place] [address]        Equal Access to Services     KESHAV SUN AH: Hadii shanique adam hadasho Soomaali, waaxda luqadaha, qaybta kaalmada adeegyada, yolanda medellin . So [facility] [phone].    ATENCIÓN: Si habla español, tiene a pastrana disposición servicios gratuitos de asistencia lingüística. [facility] [phone].    We comply with applicable federal civil rights laws and [place] laws. We do not discriminate on the basis of race, color, national origin, age, disability, sex, sexual orientation, or gender identity.            Thank you!     Thank you for choosing  ORTHOPEDICS  for your care. Our goal is always to provide you with excellent care. Hearing back from our patients is one way we can continue to improve our services. Please take a few minutes to complete the written survey that you may receive in the mail after your visit with us. Thank you!             Your Updated " Medication List - Protect others around you: Learn how to safely use, store and throw away your medicines at www.disposemymeds.org.          This list is accurate as of: 1/3/18  2:09 PM.  Always use your most recent med list.                   Brand Name Dispense Instructions for use Diagnosis    ALLOPURINOL PO      Take 100 mg by mouth daily        diclofenac 1 % Gel topical gel    VOLTAREN    100 g    Apply 1 gram to elbow and 1 gram gram to wrist four times daily using enclosed dosing card.    Medial epicondylitis, right elbow, Tendinitis of right wrist       ergocalciferol 07631 UNITS capsule    ERGOCALCIFEROL     Take 50,000 Units by mouth        isosorbide mononitrate 30 MG 24 hr tablet    IMDUR     Take 30 mg by mouth daily        LISINOPRIL PO      Take 20 mg by mouth daily        * methylPREDNISolone 4 MG tablet    MEDROL DOSEPAK    21 tablet    Follow package instructions    Right elbow pain, Right wrist pain       * methylPREDNISolone 4 MG tablet    MEDROL DOSEPAK    21 tablet    Follow package instructions    Medial epicondylitis, right elbow, Tendinitis of right wrist       NADOLOL PO      Take 80 mg by mouth daily        nitroGLYcerin 0.4 MG sublingual tablet    NITROSTAT     Do not crush. Dissolve 1 tablet under the tongue at 1st sign of angina. Repeat in 5 minutes till relief. Max of 3 tabs/15 minutes.        NORVASC PO      Take 10 mg by mouth daily        OMEGA-3 FISH OIL PO      Take 2 g by mouth daily        PRAVASTATIN SODIUM PO      Take 20 mg by mouth daily        PRILOSEC PO      Take 20 mg by mouth daily        TRAMADOL HCL PO      Take 50 mg by mouth daily        XARELTO PO      Take 20 mg by mouth        * Notice:  This list has 2 medication(s) that are the same as other medications prescribed for you. Read the directions carefully, and ask your doctor or other care provider to review them with you.

## 2018-01-08 ENCOUNTER — TRANSFERRED RECORDS (OUTPATIENT)
Dept: HEALTH INFORMATION MANAGEMENT | Facility: HOSPITAL | Age: 78
End: 2018-01-08

## 2018-07-13 DIAGNOSIS — M25.531 RIGHT WRIST PAIN: ICD-10-CM

## 2018-07-13 DIAGNOSIS — M25.521 RIGHT ELBOW PAIN: ICD-10-CM

## 2018-07-13 RX ORDER — METHYLPREDNISOLONE 4 MG
TABLET, DOSE PACK ORAL
Qty: 21 TABLET | Refills: 0 | Status: SHIPPED | OUTPATIENT
Start: 2018-07-13 | End: 2018-09-09

## 2018-07-13 NOTE — TELEPHONE ENCOUNTER
Medrol dosepak   Last Office Visit: 11/28/2017  Last Refill Date:12/5/2017  # 21          Refills  0      Thank you!

## 2018-07-16 ENCOUNTER — TELEPHONE (OUTPATIENT)
Dept: ORTHOPEDICS | Facility: OTHER | Age: 78
End: 2018-07-16

## 2018-07-16 NOTE — TELEPHONE ENCOUNTER
Received refill request on for Medrol kizzy. Dr Neves did a refill of medication. Called patient this morning to let her know medication could be picked up at her pharmacy. Patient stated that she never requested medication and didn't need medication. This nurse called pharmacy and informed them that patient didn't request the medication and didn't need medication and to cancel medication. Patient is aware.

## 2018-09-09 ENCOUNTER — APPOINTMENT (OUTPATIENT)
Dept: CT IMAGING | Facility: OTHER | Age: 78
End: 2018-09-09
Attending: NURSE PRACTITIONER
Payer: MEDICARE

## 2018-09-09 ENCOUNTER — HOSPITAL ENCOUNTER (OUTPATIENT)
Facility: OTHER | Age: 78
Setting detail: OBSERVATION
Discharge: HOME | End: 2018-09-11
Attending: FAMILY MEDICINE | Admitting: FAMILY MEDICINE
Payer: MEDICARE

## 2018-09-09 ENCOUNTER — APPOINTMENT (OUTPATIENT)
Dept: GENERAL RADIOLOGY | Facility: OTHER | Age: 78
End: 2018-09-09
Attending: NURSE PRACTITIONER
Payer: MEDICARE

## 2018-09-09 DIAGNOSIS — I48.20 CHRONIC A-FIB (H): ICD-10-CM
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
## 2018-09-09 DIAGNOSIS — Z79.01 CHRONIC ANTICOAGULATION: ICD-10-CM

## 2018-09-09 DIAGNOSIS — T14.8XXA HEMATOMA OF SKIN: ICD-10-CM

## 2018-09-09 DIAGNOSIS — S09.90XA HEAD INJURY, INITIAL ENCOUNTER: ICD-10-CM

## 2018-09-09 DIAGNOSIS — E83.42 HYPOMAGNESEMIA: ICD-10-CM

## 2018-09-09 PROBLEM — I48.91 ATRIAL FIBRILLATION (H): Status: ACTIVE | Noted: 2017-04-12

## 2018-09-09 PROBLEM — D50.0 IRON DEFICIENCY ANEMIA DUE TO CHRONIC BLOOD LOSS: Status: ACTIVE | Noted: 2018-01-20

## 2018-09-09 PROBLEM — Z79.891 LONG TERM (CURRENT) USE OF OPIATE ANALGESIC: Status: ACTIVE | Noted: 2017-11-21

## 2018-09-09 LAB
ALBUMIN SERPL-MCNC: 3.9 G/DL (ref 3.5–5.7)
ALBUMIN UR-MCNC: NEGATIVE MG/DL
ALP SERPL-CCNC: 73 U/L (ref 34–104)
ALT SERPL W P-5'-P-CCNC: 9 U/L (ref 7–52)
ANION GAP SERPL CALCULATED.3IONS-SCNC: 7 MMOL/L (ref 3–14)
APPEARANCE UR: CLEAR
AST SERPL W P-5'-P-CCNC: 16 U/L (ref 13–39)
BASOPHILS # BLD AUTO: 0.1 10E9/L (ref 0–0.2)
BASOPHILS NFR BLD AUTO: 0.9 %
BILIRUB SERPL-MCNC: 0.6 MG/DL (ref 0.3–1)
BILIRUB UR QL STRIP: NEGATIVE
BUN SERPL-MCNC: 21 MG/DL (ref 7–25)
CALCIUM SERPL-MCNC: 9.8 MG/DL (ref 8.6–10.3)
CHLORIDE SERPL-SCNC: 106 MMOL/L (ref 98–107)
CO2 SERPL-SCNC: 26 MMOL/L (ref 21–31)
COLOR UR AUTO: YELLOW
CREAT SERPL-MCNC: 1 MG/DL (ref 0.6–1.2)
DIFFERENTIAL METHOD BLD: ABNORMAL
EOSINOPHIL # BLD AUTO: 0.7 10E9/L (ref 0–0.7)
EOSINOPHIL NFR BLD AUTO: 9.6 %
ERYTHROCYTE [DISTWIDTH] IN BLOOD BY AUTOMATED COUNT: 13.3 % (ref 10–15)
GFR SERPL CREATININE-BSD FRML MDRD: 54 ML/MIN/1.7M2
GLUCOSE SERPL-MCNC: 130 MG/DL (ref 70–105)
GLUCOSE UR STRIP-MCNC: NEGATIVE MG/DL
HCT VFR BLD AUTO: 35.2 % (ref 35–47)
HGB BLD-MCNC: 11.5 G/DL (ref 11.7–15.7)
HGB UR QL STRIP: NEGATIVE
HYALINE CASTS #/AREA URNS LPF: ABNORMAL /LPF
IMM GRANULOCYTES # BLD: 0 10E9/L (ref 0–0.4)
IMM GRANULOCYTES NFR BLD: 0.3 %
KETONES UR STRIP-MCNC: NEGATIVE MG/DL
LEUKOCYTE ESTERASE UR QL STRIP: ABNORMAL
LYMPHOCYTES # BLD AUTO: 2.2 10E9/L (ref 0.8–5.3)
LYMPHOCYTES NFR BLD AUTO: 30.1 %
MAGNESIUM SERPL-MCNC: 1.3 MG/DL (ref 1.9–2.7)
MAGNESIUM SERPL-MCNC: 3 MG/DL (ref 1.9–2.7)
MCH RBC QN AUTO: 30.3 PG (ref 26.5–33)
MCHC RBC AUTO-ENTMCNC: 32.7 G/DL (ref 31.5–36.5)
MCV RBC AUTO: 93 FL (ref 78–100)
MONOCYTES # BLD AUTO: 0.6 10E9/L (ref 0–1.3)
MONOCYTES NFR BLD AUTO: 8.1 %
NEUTROPHILS # BLD AUTO: 3.8 10E9/L (ref 1.6–8.3)
NEUTROPHILS NFR BLD AUTO: 51 %
NITRATE UR QL: NEGATIVE
NON-SQ EPI CELLS #/AREA URNS LPF: ABNORMAL /LPF
PH UR STRIP: 6 PH (ref 5–9)
PLATELET # BLD AUTO: 161 10E9/L (ref 150–450)
POTASSIUM SERPL-SCNC: 4.6 MMOL/L (ref 3.5–5.1)
PROT SERPL-MCNC: 6.5 G/DL (ref 6.4–8.9)
RBC # BLD AUTO: 3.8 10E12/L (ref 3.8–5.2)
RBC #/AREA URNS AUTO: ABNORMAL /HPF
RENAL EPI CELLS #/AREA URNS HPF: ABNORMAL /HPF
SODIUM SERPL-SCNC: 139 MMOL/L (ref 134–144)
SOURCE: ABNORMAL
SP GR UR STRIP: 1.01 (ref 1–1.03)
TROPONIN I SERPL-MCNC: <0.03 UG/L (ref 0–0.03)
TROPONIN I SERPL-MCNC: <0.03 UG/L (ref 0–0.03)
UROBILINOGEN UR STRIP-ACNC: 0.2 EU/DL (ref 0.2–1)
WBC # BLD AUTO: 7.4 10E9/L (ref 4–11)
WBC #/AREA URNS AUTO: ABNORMAL /HPF

## 2018-09-09 PROCEDURE — G0378 HOSPITAL OBSERVATION PER HR: HCPCS

## 2018-09-09 PROCEDURE — 70450 CT HEAD/BRAIN W/O DYE: CPT

## 2018-09-09 PROCEDURE — 36415 COLL VENOUS BLD VENIPUNCTURE: CPT | Performed by: FAMILY MEDICINE

## 2018-09-09 PROCEDURE — 71045 X-RAY EXAM CHEST 1 VIEW: CPT

## 2018-09-09 PROCEDURE — 81001 URINALYSIS AUTO W/SCOPE: CPT | Performed by: NURSE PRACTITIONER

## 2018-09-09 PROCEDURE — 84484 ASSAY OF TROPONIN QUANT: CPT | Performed by: NURSE PRACTITIONER

## 2018-09-09 PROCEDURE — 80053 COMPREHEN METABOLIC PANEL: CPT | Performed by: NURSE PRACTITIONER

## 2018-09-09 PROCEDURE — A9270 NON-COVERED ITEM OR SERVICE: HCPCS | Mod: GY | Performed by: NURSE PRACTITIONER

## 2018-09-09 PROCEDURE — 83735 ASSAY OF MAGNESIUM: CPT | Performed by: NURSE PRACTITIONER

## 2018-09-09 PROCEDURE — 96375 TX/PRO/DX INJ NEW DRUG ADDON: CPT | Performed by: NURSE PRACTITIONER

## 2018-09-09 PROCEDURE — 25000132 ZZH RX MED GY IP 250 OP 250 PS 637: Mod: GY | Performed by: FAMILY MEDICINE

## 2018-09-09 PROCEDURE — 25000132 ZZH RX MED GY IP 250 OP 250 PS 637: Mod: GY | Performed by: NURSE PRACTITIONER

## 2018-09-09 PROCEDURE — 85025 COMPLETE CBC W/AUTO DIFF WBC: CPT | Performed by: NURSE PRACTITIONER

## 2018-09-09 PROCEDURE — 87086 URINE CULTURE/COLONY COUNT: CPT | Performed by: NURSE PRACTITIONER

## 2018-09-09 PROCEDURE — 25000128 H RX IP 250 OP 636: Performed by: NURSE PRACTITIONER

## 2018-09-09 PROCEDURE — 36415 COLL VENOUS BLD VENIPUNCTURE: CPT | Performed by: NURSE PRACTITIONER

## 2018-09-09 PROCEDURE — 96376 TX/PRO/DX INJ SAME DRUG ADON: CPT | Performed by: NURSE PRACTITIONER

## 2018-09-09 PROCEDURE — 96365 THER/PROPH/DIAG IV INF INIT: CPT | Performed by: NURSE PRACTITIONER

## 2018-09-09 PROCEDURE — 99219 ZZC INITIAL OBSERVATION CARE,LEVL II: CPT | Performed by: FAMILY MEDICINE

## 2018-09-09 PROCEDURE — 99285 EMERGENCY DEPT VISIT HI MDM: CPT | Mod: 25 | Performed by: NURSE PRACTITIONER

## 2018-09-09 PROCEDURE — 93005 ELECTROCARDIOGRAM TRACING: CPT | Performed by: NURSE PRACTITIONER

## 2018-09-09 PROCEDURE — 72125 CT NECK SPINE W/O DYE: CPT

## 2018-09-09 PROCEDURE — 93010 ELECTROCARDIOGRAM REPORT: CPT | Performed by: INTERNAL MEDICINE

## 2018-09-09 PROCEDURE — A9270 NON-COVERED ITEM OR SERVICE: HCPCS | Mod: GY | Performed by: FAMILY MEDICINE

## 2018-09-09 PROCEDURE — 84484 ASSAY OF TROPONIN QUANT: CPT | Mod: 91 | Performed by: FAMILY MEDICINE

## 2018-09-09 PROCEDURE — 83735 ASSAY OF MAGNESIUM: CPT | Mod: 91 | Performed by: FAMILY MEDICINE

## 2018-09-09 PROCEDURE — 99285 EMERGENCY DEPT VISIT HI MDM: CPT | Mod: Z6 | Performed by: NURSE PRACTITIONER

## 2018-09-09 RX ORDER — ACETAMINOPHEN 325 MG/1
650 TABLET ORAL EVERY 4 HOURS PRN
Status: DISCONTINUED | OUTPATIENT
Start: 2018-09-09 | End: 2018-09-11 | Stop reason: HOSPADM

## 2018-09-09 RX ORDER — POTASSIUM CHLORIDE 1500 MG/1
20-40 TABLET, EXTENDED RELEASE ORAL
Status: DISCONTINUED | OUTPATIENT
Start: 2018-09-09 | End: 2018-09-11 | Stop reason: HOSPADM

## 2018-09-09 RX ORDER — NALOXONE HYDROCHLORIDE 0.4 MG/ML
.1-.4 INJECTION, SOLUTION INTRAMUSCULAR; INTRAVENOUS; SUBCUTANEOUS
Status: DISCONTINUED | OUTPATIENT
Start: 2018-09-09 | End: 2018-09-11 | Stop reason: HOSPADM

## 2018-09-09 RX ORDER — SODIUM CHLORIDE 9 MG/ML
INJECTION, SOLUTION INTRAVENOUS CONTINUOUS
Status: DISCONTINUED | OUTPATIENT
Start: 2018-09-09 | End: 2018-09-09

## 2018-09-09 RX ORDER — ACETAMINOPHEN 650 MG/1
650 SUPPOSITORY RECTAL EVERY 4 HOURS PRN
Status: DISCONTINUED | OUTPATIENT
Start: 2018-09-09 | End: 2018-09-11 | Stop reason: HOSPADM

## 2018-09-09 RX ORDER — ACETAMINOPHEN 325 MG/1
650 TABLET ORAL ONCE
Status: COMPLETED | OUTPATIENT
Start: 2018-09-09 | End: 2018-09-09

## 2018-09-09 RX ORDER — ONDANSETRON 2 MG/ML
4 INJECTION INTRAMUSCULAR; INTRAVENOUS EVERY 6 HOURS PRN
Status: DISCONTINUED | OUTPATIENT
Start: 2018-09-09 | End: 2018-09-11 | Stop reason: HOSPADM

## 2018-09-09 RX ORDER — ONDANSETRON 2 MG/ML
4 INJECTION INTRAMUSCULAR; INTRAVENOUS ONCE
Status: COMPLETED | OUTPATIENT
Start: 2018-09-09 | End: 2018-09-09

## 2018-09-09 RX ORDER — METOCLOPRAMIDE HYDROCHLORIDE 5 MG/ML
5 INJECTION INTRAMUSCULAR; INTRAVENOUS ONCE
Status: COMPLETED | OUTPATIENT
Start: 2018-09-09 | End: 2018-09-09

## 2018-09-09 RX ORDER — ALLOPURINOL 100 MG/1
100 TABLET ORAL DAILY
Status: DISCONTINUED | OUTPATIENT
Start: 2018-09-10 | End: 2018-09-11 | Stop reason: HOSPADM

## 2018-09-09 RX ORDER — HYDROMORPHONE HYDROCHLORIDE 1 MG/ML
0.5 INJECTION, SOLUTION INTRAMUSCULAR; INTRAVENOUS; SUBCUTANEOUS ONCE
Status: COMPLETED | OUTPATIENT
Start: 2018-09-09 | End: 2018-09-09

## 2018-09-09 RX ORDER — MAGNESIUM SULFATE HEPTAHYDRATE 40 MG/ML
4 INJECTION, SOLUTION INTRAVENOUS EVERY 4 HOURS PRN
Status: DISCONTINUED | OUTPATIENT
Start: 2018-09-09 | End: 2018-09-09

## 2018-09-09 RX ORDER — LISINOPRIL 20 MG/1
20 TABLET ORAL DAILY
Status: DISCONTINUED | OUTPATIENT
Start: 2018-09-10 | End: 2018-09-11 | Stop reason: HOSPADM

## 2018-09-09 RX ORDER — PANTOPRAZOLE SODIUM 40 MG/1
40 TABLET, DELAYED RELEASE ORAL
Status: DISCONTINUED | OUTPATIENT
Start: 2018-09-10 | End: 2018-09-11 | Stop reason: HOSPADM

## 2018-09-09 RX ORDER — ISOSORBIDE MONONITRATE 30 MG/1
30 TABLET, EXTENDED RELEASE ORAL DAILY
Status: DISCONTINUED | OUTPATIENT
Start: 2018-09-10 | End: 2018-09-11 | Stop reason: HOSPADM

## 2018-09-09 RX ORDER — NADOLOL 20 MG/1
80 TABLET ORAL DAILY
Status: DISCONTINUED | OUTPATIENT
Start: 2018-09-10 | End: 2018-09-11 | Stop reason: HOSPADM

## 2018-09-09 RX ORDER — HYDROMORPHONE HYDROCHLORIDE 1 MG/ML
0.2 INJECTION, SOLUTION INTRAMUSCULAR; INTRAVENOUS; SUBCUTANEOUS ONCE
Status: COMPLETED | OUTPATIENT
Start: 2018-09-09 | End: 2018-09-09

## 2018-09-09 RX ORDER — MAGNESIUM SULFATE HEPTAHYDRATE 40 MG/ML
4 INJECTION, SOLUTION INTRAVENOUS EVERY 4 HOURS PRN
Status: DISCONTINUED | OUTPATIENT
Start: 2018-09-09 | End: 2018-09-11 | Stop reason: HOSPADM

## 2018-09-09 RX ORDER — HYDROCODONE BITARTRATE AND ACETAMINOPHEN 5; 325 MG/1; MG/1
1-2 TABLET ORAL EVERY 4 HOURS PRN
Status: DISCONTINUED | OUTPATIENT
Start: 2018-09-09 | End: 2018-09-11 | Stop reason: HOSPADM

## 2018-09-09 RX ORDER — POTASSIUM CHLORIDE 7.45 MG/ML
10 INJECTION INTRAVENOUS
Status: DISCONTINUED | OUTPATIENT
Start: 2018-09-09 | End: 2018-09-11 | Stop reason: HOSPADM

## 2018-09-09 RX ORDER — MAGNESIUM SULFATE HEPTAHYDRATE 40 MG/ML
4 INJECTION, SOLUTION INTRAVENOUS ONCE
Status: COMPLETED | OUTPATIENT
Start: 2018-09-09 | End: 2018-09-09

## 2018-09-09 RX ORDER — NITROGLYCERIN 0.4 MG/1
0.4 TABLET SUBLINGUAL EVERY 5 MIN PRN
Status: DISCONTINUED | OUTPATIENT
Start: 2018-09-09 | End: 2018-09-11 | Stop reason: HOSPADM

## 2018-09-09 RX ORDER — ONDANSETRON 4 MG/1
4 TABLET, ORALLY DISINTEGRATING ORAL EVERY 6 HOURS PRN
Status: DISCONTINUED | OUTPATIENT
Start: 2018-09-09 | End: 2018-09-11 | Stop reason: HOSPADM

## 2018-09-09 RX ORDER — AMLODIPINE BESYLATE 10 MG/1
10 TABLET ORAL DAILY
Status: DISCONTINUED | OUTPATIENT
Start: 2018-09-10 | End: 2018-09-11 | Stop reason: HOSPADM

## 2018-09-09 RX ADMIN — HYDROMORPHONE HYDROCHLORIDE 0.5 MG: 10 INJECTION, SOLUTION INTRAMUSCULAR; INTRAVENOUS; SUBCUTANEOUS at 16:17

## 2018-09-09 RX ADMIN — SODIUM CHLORIDE: 900 INJECTION, SOLUTION INTRAVENOUS at 15:39

## 2018-09-09 RX ADMIN — HYDROMORPHONE HYDROCHLORIDE 0.2 MG: 1 INJECTION, SOLUTION INTRAMUSCULAR; INTRAVENOUS; SUBCUTANEOUS at 19:37

## 2018-09-09 RX ADMIN — ACETAMINOPHEN 650 MG: 325 TABLET, FILM COATED ORAL at 17:00

## 2018-09-09 RX ADMIN — MAGNESIUM SULFATE HEPTAHYDRATE 2 G: 40 INJECTION, SOLUTION INTRAVENOUS at 16:52

## 2018-09-09 RX ADMIN — METOCLOPRAMIDE 5 MG: 5 INJECTION, SOLUTION INTRAMUSCULAR; INTRAVENOUS at 16:58

## 2018-09-09 RX ADMIN — MAGNESIUM SULFATE HEPTAHYDRATE 2 G: 40 INJECTION, SOLUTION INTRAVENOUS at 18:04

## 2018-09-09 RX ADMIN — ONDANSETRON 4 MG: 2 INJECTION INTRAMUSCULAR; INTRAVENOUS at 16:13

## 2018-09-09 RX ADMIN — HYDROCODONE BITARTRATE AND ACETAMINOPHEN 2 TABLET: 5; 325 TABLET ORAL at 21:15

## 2018-09-09 ASSESSMENT — ENCOUNTER SYMPTOMS
BACK PAIN: 0
SPEECH DIFFICULTY: 0
WOUND: 0
NAUSEA: 1
CHILLS: 0
EYES NEGATIVE: 1
TREMORS: 0
WEAKNESS: 0
NUMBNESS: 0
FEVER: 0
JOINT SWELLING: 0
NECK PAIN: 1
SHORTNESS OF BREATH: 0
ABDOMINAL PAIN: 0
RESPIRATORY NEGATIVE: 1
CHEST TIGHTNESS: 0
VOMITING: 0
LIGHT-HEADEDNESS: 0
HEADACHES: 1
CARDIOVASCULAR NEGATIVE: 1
CONSTITUTIONAL NEGATIVE: 1
PSYCHIATRIC NEGATIVE: 1
EYE PAIN: 0
DIZZINESS: 0
SEIZURES: 0
FACIAL ASYMMETRY: 0
CONFUSION: 0
NECK STIFFNESS: 1

## 2018-09-09 NOTE — IP AVS SNAPSHOT
MRN:[id number]                      After Visit Summary   9/9/2018    Katya Alcala    MRN: [id number]           Thank you!     Thank you for choosing [facility] for your care. Our goal is always to provide you with excellent care. Hearing back from our patients is one way we can continue to improve our services. Please take a few minutes to complete the written survey that you may receive in the mail after you visit with us. Thank you!        Patient Information     Date Of Birth          1940        About your hospital stay     You were admitted on:  September 9, 2018 You last received care in the:  [facility]    You were discharged on:  September 11, 2018        Reason for your hospital stay       For observation following a fall and mild head injury                  Who to Call     For medical emergencies, please call 911.  For non-urgent questions about your medical care, please call your primary care provider or clinic, [phone]          Attending Provider     Provider Specialty    Stephanie Baumann MD Family Practice    KulwantDomingo rios MD Internal Medicine       Primary Care Provider Office Phone # Fax #    Lokesh JOAQUIN Taylor [phone]      After Care Instructions     Activity       Your activity upon discharge: activity as tolerated and as recommended by Physical Therapy            Diet       Follow this diet upon discharge: Orders Placed This Encounter      Combination Diet Low Saturated Fat Na <2400mg Diet, No Caffeine Diet            Wound care and dressings       Instructions to care for your wound at home: ice to area for comfort and keep wound clean and dry.                  Follow-up Appointments     Follow-up and recommended labs and tests        Hospital Follow-up scheduled with Dr. Taylor at [facility] on Thursday September 13th @ 2:30.  Consider chest XRay followup of small right upper lobe opacity.                 "  Additional Services     Home Care OT Referral for Hospital Discharge       OT to eval and treat    Your provider has ordered home care - occupational therapy. If you have not been contacted within 2 days of your discharge please call the department phone number listed on the top of this document.            Home Care PT Referral for Hospital Discharge       PT to eval and treat    Your provider has ordered home care - physical therapy. If you have not been contacted within 2 days of your discharge please call the department phone number listed on the top of this document.            Home care nursing referral       RN skilled nursing visit. RN to assess forehead hematoma for signs/symptoms of infection.    Your provider has ordered home care nursing services. If you have not been contacted within 2 days of your discharge please call the inpatient department phone number at [phone] .                  Pending Results     Date and Time Order Name Status Description    9/10/2018 0600 EKG 12-lead, tracing only In process     9/9/2018 1915 Urine Culture Aerobic Bacterial In process             Statement of Approval     Ordered          09/11/18 1116  I have reviewed and agree with all the recommendations and orders detailed in this document.  EFFECTIVE NOW     Approved and electronically signed by:  Domingo Blum MD             Admission Information     Date & Time Provider Department Dept. Phone    9/9/2018 Domingo Blum MD [facility] [phone]      Your Vitals Were     Blood Pressure Pulse Temperature Respirations Height Weight    133/75 66 98.9  F (37.2  C) (Tympanic) 16 1.575 m (5' 2\") 65.3 kg (143 lb 15.4 oz)    Pulse Oximetry BMI (Body Mass Index)                97% 26.33 kg/m2          MyChart Information     [employer] lets you send messages to your doctor, view your test results, renew your prescriptions, schedule appointments and more. To sign up, go to " "[email or web address] . Click on \"Log in\" on the left side of the screen, which will take you to the Welcome page. Then click on \"Sign up Now\" on the right side of the page.     You will be asked to enter the access code listed below, as well as some personal information. Please follow the directions to create your username and password.     Your access code is: [email or web address]  Expires: 2018  3:14 PM     Your access code will  in 90 days. If you need help or a new code, please call your [place] clinic or [phone].        Care EveryWhere ID     This is your Care EveryWhere ID. This could be used by other organizations to access your [place] medical records  [id number]        Equal Access to Services     JUDY SUN : Niki Farr, anoop dixon, guerline hilliardalnini holbrook, yolanda medellin . So [facility] [phone].    ATENCIÓN: Si habla español, tiene a pastrana disposición servicios gratuitos de asistencia lingüística. Demarco al [phone].    We comply with applicable federal civil rights laws and [place] laws. We do not discriminate on the basis of race, color, national origin, age, disability, sex, sexual orientation, or gender identity.               Review of your medicines      CONTINUE these medicines which have NOT CHANGED        Dose / Directions    allopurinol 100 MG tablet   Commonly known as:  ZYLOPRIM        Dose:  100 mg   Take 100 mg by mouth daily   Refills:  0       amLODIPine 10 MG tablet   Commonly known as:  NORVASC        Dose:  10 mg   Take 10 mg by mouth daily   Refills:  0       ELIQUIS 5 MG tablet   Generic drug:  apixaban ANTICOAGULANT        Dose:  5 mg   Take 5 mg by mouth 2 times daily   Refills:  0       isosorbide mononitrate 30 MG 24 hr tablet   Commonly known as:  IMDUR        Dose:  30 mg   Take 30 mg by mouth daily   Refills:  0       lisinopril 20 MG tablet   Commonly known as:  PRINIVIL/ZESTRIL        Dose:  20 mg   Take 20 " mg by mouth daily   Refills:  0       nadolol 80 MG tablet   Commonly known as:  CORGARD        Dose:  80 mg   Take 80 mg by mouth daily   Refills:  0       nitroGLYcerin 0.4 MG sublingual tablet   Commonly known as:  NITROSTAT        Do not crush. Dissolve 1 tablet under the tongue at 1st sign of angina. Repeat in 5 minutes till relief. Max of 3 tabs/15 minutes.   Refills:  0       OMEGA-3 CF 1000 MG Caps        Dose:  2000 mg   Take 2,000 mg by mouth daily   Refills:  0       omeprazole 20 MG CR capsule   Commonly known as:  priLOSEC        Dose:  20 mg   Take 20 mg by mouth daily   Refills:  0       pravastatin 20 MG tablet   Commonly known as:  PRAVACHOL        Dose:  20 mg   Take 20 mg by mouth daily   Refills:  0       traMADol 50 MG tablet   Commonly known as:  ULTRAM        Dose:  50 mg   Take 50 mg by mouth every 6 hours as needed for pain   Refills:  0         STOP taking     diclofenac 1 % Gel topical gel   Commonly known as:  VOLTAREN           ergocalciferol 15218 units capsule   Commonly known as:  ERGOCALCIFEROL                    Protect others around you: Learn how to safely use, store and throw away your medicines at www.disposemymeds.org.        Information about OPIOIDS     PRESCRIPTION OPIOIDS: WHAT YOU NEED TO KNOW   We gave you an opioid (narcotic) pain medicine. It is important to manage your pain, but opioids are not always the best choice. You should first try all the other options your care team gave you. Take this medicine for as short a time (and as few doses) as possible.    Some activities can increase your pain, such as bandage changes or therapy sessions. It may help to take your pain medicine 30 to 60 minutes before these activities. Reduce your stress by getting enough sleep, working on hobbies you enjoy and practicing relaxation or meditation. Talk to your care team about ways to manage your pain beyond prescription opioids.    These medicines have risks:    DO NOT drive when on  new or higher doses of pain medicine. These medicines can affect your alertness and reaction times, and you could be arrested for driving under the influence (DUI). If you need to use opioids long-term, talk to your care team about driving.    DO NOT operate heavy machinery    DO NOT do any other dangerous activities while taking these medicines.    DO NOT drink any alcohol while taking these medicines.     If the opioid prescribed includes acetaminophen, DO NOT take with any other medicines that contain acetaminophen. Read all labels carefully. Look for the word  acetaminophen  or  Tylenol.  Ask your pharmacist if you have questions or are unsure.    You can get addicted to pain medicines, especially if you have a history of addiction (chemical, alcohol or substance dependence). Talk to your care team about ways to reduce this risk.    All opioids tend to cause constipation. Drink plenty of water and eat foods that have a lot of fiber, such as fruits, vegetables, prune juice, apple juice and high-fiber cereal. Take a laxative (Miralax, milk of magnesia, Colace, Senna) if you don t move your bowels at least every other day. Other side effects include upset stomach, sleepiness, dizziness, throwing up, tolerance (needing more of the medicine to have the same effect), physical dependence and slowed breathing.    Store your pills in a secure place, locked if possible. We will not replace any lost or stolen medicine. If you don t finish your medicine, please throw away (dispose) as directed by your pharmacist. The [place] [employer]ution [employer] has more information about safe disposal: https://www.pca.[facility].mn.us/living-green/managing-unwanted-medications             Medication List: This is a list of all your medications and when to take them. Check marks below indicate your daily home schedule. Keep this list as a reference.      Medications           Morning Afternoon Evening Bedtime As Needed    allopurinol 100  MG tablet   Commonly known as:  ZYLOPRIM   Take 100 mg by mouth daily   Last time this was given:  100 mg on 9/10/2018 11:29 AM                                amLODIPine 10 MG tablet   Commonly known as:  NORVASC   Take 10 mg by mouth daily   Last time this was given:  10 mg on 9/10/2018 11:28 AM                                ELIQUIS 5 MG tablet   Take 5 mg by mouth 2 times daily   Generic drug:  apixaban ANTICOAGULANT                                isosorbide mononitrate 30 MG 24 hr tablet   Commonly known as:  IMDUR   Take 30 mg by mouth daily   Last time this was given:  30 mg on 9/10/2018 11:30 AM                                lisinopril 20 MG tablet   Commonly known as:  PRINIVIL/ZESTRIL   Take 20 mg by mouth daily   Last time this was given:  20 mg on 9/10/2018 11:28 AM                                nadolol 80 MG tablet   Commonly known as:  CORGARD   Take 80 mg by mouth daily   Last time this was given:  80 mg on 9/10/2018 11:26 AM                                nitroGLYcerin 0.4 MG sublingual tablet   Commonly known as:  NITROSTAT   Do not crush. Dissolve 1 tablet under the tongue at 1st sign of angina. Repeat in 5 minutes till relief. Max of 3 tabs/15 minutes.                                OMEGA-3 CF 1000 MG Caps   Take 2,000 mg by mouth daily                                omeprazole 20 MG CR capsule   Commonly known as:  priLOSEC   Take 20 mg by mouth daily                                pravastatin 20 MG tablet   Commonly known as:  PRAVACHOL   Take 20 mg by mouth daily   Last time this was given:  20 mg on 9/10/2018 11:26 AM                                traMADol 50 MG tablet   Commonly known as:  ULTRAM   Take 50 mg by mouth every 6 hours as needed for pain

## 2018-09-09 NOTE — IP AVS SNAPSHOT
[facility]    [address]    [place] [address]    Phone:  [phone]    Fax:  [phone]                                       After Visit Summary   9/9/2018    Katya Alcala    MRN: [id number]           After Visit Summary Signature Page     I have received my discharge instructions, and my questions have been answered. I have discussed any challenges I see with this plan with the nurse or doctor.    ..........................................................................................................................................  Patient/Patient Representative Signature      ..........................................................................................................................................  Patient Representative Print Name and Relationship to Patient    ..................................................               ................................................  Date                                            Time    ..........................................................................................................................................  Reviewed by Signature/Title    ...................................................              ..............................................  Date                                                            Time          22EPIC Rev 08/18

## 2018-09-09 NOTE — ED TRIAGE NOTES
Pt comes in after falling and hitting her head on the concrete. Pt did not have a loss of consciousness, pt is on eliquis. Pt has large supraorbital hematoma on her right eye. Pt is fully alert and oriented GCS 15. Pt's head was wrapped with ace wrap for compression to hematoma.    Leonila Silva RN on 9/9/2018 at 3:14 PM

## 2018-09-09 NOTE — ED PROVIDER NOTES
History     Chief Complaint   Patient presents with     Trauma     The history is provided by the patient and a relative.     Katya Alcala is a 78 year old female who is brought into the ED by her son immediately following fall today. She was stepping out of the car and reports that she fell stepping down from curb. She denies any lightheadedness prior to fall. Reports that she believes she tripped. She hit the front right forehead on the cement. She denies any LOC. She denies any changes in vision. No acute neuro changes. She has a visible hematoma and bruising over her right forehead. Additionally, she does admit to pain in her neck and C-Collar was placed following arrival. She denies any other associated injuries. She denies any chest pain or pressure. No shortness of breath. No edema. She admits to a history of chronic atrial fibrillation for which she is on Xarelto oral anticoagulation. She does have a history of CAD with history of 2V CABG. Additionally, she has a history of GI bleeding. Level II trauma activated on arrival.     Problem List:    Patient Active Problem List    Diagnosis Date Noted     Hemorrhage of gastrointestinal tract 02/07/2014     Priority: Medium     Problem list name updated by automated process. 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 Provider to review       Lower GI bleeding 02/07/2014     Priority: Medium        Past Medical History:    Past Medical History:   Diagnosis Date     Acute upper respiratory infections of unspecified 11/21/1999       Past Surgical History:    No past surgical history on file.    Family History:    No family history on file.    Social History:  Marital Status:   [5]  Social History   Substance Use Topics     Smoking status: Never Smoker     Smokeless tobacco: Never  "Used     Alcohol use No        Medications:      ALLOPURINOL PO   AmLODIPine Besylate (NORVASC PO)   Apixaban (ELIQUIS PO)   isosorbide mononitrate (IMDUR) 30 MG 24 hr tablet   LISINOPRIL PO   NADOLOL PO   Omega-3 Fatty Acids (OMEGA-3 FISH OIL PO)   Omeprazole (PRILOSEC PO)   PRAVASTATIN SODIUM PO   TRAMADOL HCL PO   diclofenac (VOLTAREN) 1 % GEL topical gel   ergocalciferol (ERGOCALCIFEROL) 71673 UNITS capsule   nitroGLYcerin (NITROSTAT) 0.4 MG sublingual tablet         Review of Systems   Constitutional: Negative.  Negative for chills and fever.   HENT: Negative.  Negative for dental problem, ear pain and nosebleeds.    Eyes: Negative.  Negative for pain and visual disturbance.   Respiratory: Negative.  Negative for chest tightness and shortness of breath.    Cardiovascular: Negative.  Negative for chest pain and leg swelling.   Gastrointestinal: Positive for nausea. Negative for abdominal pain and vomiting.   Genitourinary: Negative.    Musculoskeletal: Positive for neck pain and neck stiffness. Negative for back pain and joint swelling.   Skin: Negative for wound.   Neurological: Positive for headaches. Negative for dizziness, tremors, seizures, syncope, facial asymmetry, speech difficulty, weakness, light-headedness and numbness.   Psychiatric/Behavioral: Negative.  Negative for confusion.       Physical Exam   BP: 148/68  Pulse: 77  Temp: 97.5  F (36.4  C)  Resp: 20  Height: 157.5 cm (5' 2\")  Weight: 63.5 kg (140 lb)  SpO2: 98 %      Physical Exam   Constitutional: She is oriented to person, place, and time. Vital signs are normal. She appears well-developed and well-nourished. She is cooperative.  Non-toxic appearance. She appears distressed.   HENT:   Head: Normocephalic. Head is with contusion. Head is without Flannery's sign and without abrasion.       Right Ear: Hearing, tympanic membrane, external ear and ear canal normal.   Left Ear: Hearing, tympanic membrane, external ear and ear canal normal.   Nose: " Nose normal. No nasal deformity. No epistaxis.   Mouth/Throat: Oropharynx is clear and moist and mucous membranes are normal.   Bruising around right eye.    Eyes: Conjunctivae, EOM and lids are normal. Pupils are equal, round, and reactive to light.   Neck:   Paraspinal tenderness along cervical spine. Cleared by CT.   Cardiovascular: Normal rate, S1 normal, S2 normal and normal heart sounds.  An irregular rhythm present.   Pulmonary/Chest: Effort normal and breath sounds normal. No respiratory distress.   Abdominal: Soft. Normal appearance and bowel sounds are normal. There is no tenderness. There is no rigidity and no guarding.   Musculoskeletal:   Movement of all extremities normal.    Neurological: She is alert and oriented to person, place, and time. She has normal strength. She displays no tremor. No cranial nerve deficit or sensory deficit. She exhibits normal muscle tone. She displays no seizure activity. Coordination normal. GCS eye subscore is 4. GCS verbal subscore is 5. GCS motor subscore is 6.   Skin: Skin is warm and dry. Bruising and laceration noted. No abrasion noted. She is not diaphoretic. No erythema.   See head exam.    Psychiatric: Her speech is normal and behavior is normal. Her mood appears anxious. Cognition and memory are normal. She exhibits normal recent memory.   Nursing note and vitals reviewed.      ED Course     ED Course     Procedures               EKG Interpretation:      Interpreted by Danuta Hernandes  Symptoms at time of EKG: None. Fall Chronic AF.  Rhythm: atrial fibrillation - controlled  Rate: Normal  Axis: Normal  Ectopy: none  Conduction: normal  ST Segments/ T Waves: T wave inversion, possible old lateral infarct.  Q Waves: present, unable to compare to any recent EKG. Initial Troponin negative.     Clinical Impression: non-specific EKG and atrial fibrillation (chronic)    Trauma:   Level of trauma activation: Level 2 trauma called at arrival.  C-collar and  immobilization: applied in ED on initial evaluation.  CSpine Clearance: Negative CT.   GCS at arrival: 15  GCS at disposition: unchanged  Consults prior to admission or transfer: None  Procedures done in the ED: FAST exam negative.             Results for orders placed or performed during the hospital encounter of 09/09/18 (from the past 24 hour(s))   CT Head w/o Contrast    Narrative    PROCEDURE: CT HEAD W/O CONTRAST     HISTORY: fall, head injury; .    COMPARISON: None.    TECHNIQUE:  Helical images of the head from the foramen magnum to the  vertex were obtained without contrast.    FINDINGS: The ventricles and sulci are normal in volume. No acute  intracranial hemorrhage, mass effect, midline shift, hydrocephalus or  basilar cystern effacement are present.    The grey-white matter interface is preserved.    The calvarium is intact. The mastoid air cells are clear.  The  visualized paranasal sinuses are clear. There is a hematoma in the  right for head.      Impression    IMPRESSION: Normal brain      BLAKE BECKFORD MD   CT Cervical Spine w/o Contrast    Narrative    PROCEDURE: CT CERVICAL SPINE W/O CONTRAST 9/9/2018 3:35 PM    HISTORY: Fall, neck pain;     COMPARISONS: None.    Meds/Dose Given:    TECHNIQUE: CT scan cervical spine with sagittal coronal  reconstructions    FINDINGS: There are degenerative changes of the middle atlantoaxial  joint. Calcifications are seen in the transverse ligament of the  atlas. There is decrease in height in the C3 C4 C4 C5 C5 C6 disc.  There are degenerative changes of the cervical facet joints at C2-C3  C3-C4 on the right. There is atherosclerotic plaquing at the carotid  bifurcations. Otherwise the prevertebral soft tissues appear normal.         Impression    IMPRESSION: Degenerative changes in cervical spine no fractures    BLAKE BECKFORD MD   XR Chest Port 1 View    Narrative    PROCEDURE:  XR CHEST PORT 1 VW    HISTORY:  fall; .     COMPARISON:  2014    FINDINGS:    Postoperative changes of an aortic valve replacement are seen. The  pulmonary vasculature is normal.  There is a small area of ill-defined  increase in density in the upper portion of the right lung seen best  between the anterior aspects of the second and third ribs on the  right. No pleural effusion or pneumothorax.      Impression    IMPRESSION:  Small right upper lobe opacity.      BLAKE BECKFORD MD   CBC with platelets differential   Result Value Ref Range    WBC 7.4 4.0 - 11.0 10e9/L    RBC Count 3.80 3.8 - 5.2 10e12/L    Hemoglobin 11.5 (L) 11.7 - 15.7 g/dL    Hematocrit 35.2 35.0 - 47.0 %    MCV 93 78 - 100 fl    MCH 30.3 26.5 - 33.0 pg    MCHC 32.7 31.5 - 36.5 g/dL    RDW 13.3 10.0 - 15.0 %    Platelet Count 161 150 - 450 10e9/L    Diff Method Automated Method     % Neutrophils 51.0 %    % Lymphocytes 30.1 %    % Monocytes 8.1 %    % Eosinophils 9.6 %    % Basophils 0.9 %    % Immature Granulocytes 0.3 %    Absolute Neutrophil 3.8 1.6 - 8.3 10e9/L    Absolute Lymphocytes 2.2 0.8 - 5.3 10e9/L    Absolute Monocytes 0.6 0.0 - 1.3 10e9/L    Absolute Eosinophils 0.7 0.0 - 0.7 10e9/L    Absolute Basophils 0.1 0.0 - 0.2 10e9/L    Abs Immature Granulocytes 0.0 0 - 0.4 10e9/L   Comprehensive metabolic panel   Result Value Ref Range    Sodium 139 134 - 144 mmol/L    Potassium 4.6 3.5 - 5.1 mmol/L    Chloride 106 98 - 107 mmol/L    Carbon Dioxide 26 21 - 31 mmol/L    Anion Gap 7 3 - 14 mmol/L    Glucose 130 (H) 70 - 105 mg/dL    Urea Nitrogen 21 7 - 25 mg/dL    Creatinine 1.00 0.60 - 1.20 mg/dL    GFR Estimate 54 (L) >60 mL/min/1.7m2    GFR Estimate If Black 65 >60 mL/min/1.7m2    Calcium 9.8 8.6 - 10.3 mg/dL    Bilirubin Total 0.6 0.3 - 1.0 mg/dL    Albumin 3.9 3.5 - 5.7 g/dL    Protein Total 6.5 6.4 - 8.9 g/dL    Alkaline Phosphatase 73 34 - 104 U/L    ALT 9 7 - 52 U/L    AST 16 13 - 39 U/L   Magnesium   Result Value Ref Range    Magnesium 1.3 (L) 1.9 - 2.7 mg/dL   *UA reflex to Microscopic   Result Value Ref  Range    Color Urine Yellow     Appearance Urine Clear     Glucose Urine Negative NEG^Negative mg/dL    Bilirubin Urine Negative NEG^Negative    Ketones Urine Negative NEG^Negative mg/dL    Specific Gravity Urine 1.015 1.000 - 1.030    Blood Urine Negative NEG^Negative    pH Urine 6.0 5.0 - 9.0 pH    Protein Albumin Urine Negative NEG^Negative mg/dL    Urobilinogen Urine 0.2 0.2 - 1.0 EU/dL    Nitrite Urine Negative NEG^Negative    Leukocyte Esterase Urine Small (A) NEG^Negative    Source Midstream Urine    Troponin I (now)   Result Value Ref Range    Troponin I ES <0.030 0.000 - 0.034 ug/L   Urine Microscopic   Result Value Ref Range    WBC Urine 5-10 (A) OTO5^0 - 5 /HPF    RBC Urine 5-10 (A) OTO2^O - 2 /HPF    Hyaline Casts O - 2 OTO2^O - 2 /LPF    Squamous Epithelial /LPF Urine Moderate (A) FEW^Few /LPF    Renal Tub Epi Moderate (A) NEG^Negative /HPF       Medications   sodium chloride 0.9% infusion ( Intravenous Stopped 9/9/18 1902)   magnesium sulfate 4 g in 100 mL sterile water (premade) (0 g Intravenous Stopped 9/9/18 1902)   HYDROmorphone (PF) (DILAUDID) injection 0.5 mg (0.5 mg Intravenous Given 9/9/18 1617)   ondansetron (ZOFRAN) injection 4 mg (4 mg Intravenous Given 9/9/18 1613)   magnesium sulfate 4 g in 100 mL sterile water (premade) (0 g Intravenous Stopped 9/9/18 1804)   metoclopramide (REGLAN) injection 5 mg (5 mg Intravenous Given 9/9/18 1658)   acetaminophen (TYLENOL) tablet 650 mg (650 mg Oral Given 9/9/18 1700)   HYDROmorphone (PF) (DILAUDID) injection 0.2 mg (0.2 mg Intravenous Given 9/9/18 1937)       Assessments & Plan (with Medical Decision Making)     I have reviewed the nursing notes.    I have reviewed the findings, diagnosis, plan and need for follow up with the patient.   Patient evaluated following fall.  She is on Xarelto oral anticoagulation for chronic atrial fibrillation.  She had no loss of consciousness with this fall today.  Resulting she does have a large right frontal scalp  hematoma.  Initial head CT negative for any hemorrhage or acute abnormality.  CT of the cervical spine without fracture or acute findings.  She does have chronic degenerative changes of the cervical spine noted. Labs with hypomagnesemia. IV magnesium replacement initiated in the ED per protocol.  This may have likely played a part in her fall today. UA whit few white cells present, nitrite negative urine culture ordered. She has been accepted for admission for continued observation following head injury on oral anticoagulation and with hypomagnesemia. She remains neurologically intact to exam. Her largest concerns include pain control and nausea. She reports relief with dilaudid and nausea improved following Reglan in ED.     New Prescriptions    No medications on file       Final diagnoses:   Fall, initial encounter   Hematoma of skin- right forehead   Chronic anticoagulation   Chronic a-fib (H)   Hypomagnesemia   Head injury, initial encounter       9/9/2018   [facility] AND [facility]     Danuta Hernandes APRN CNP  09/09/18 1959       Danuta Hernandes APRN CNP  09/09/18 2007

## 2018-09-10 ENCOUNTER — APPOINTMENT (OUTPATIENT)
Dept: OCCUPATIONAL THERAPY | Facility: OTHER | Age: 78
End: 2018-09-10
Attending: FAMILY MEDICINE
Payer: MEDICARE

## 2018-09-10 ENCOUNTER — APPOINTMENT (OUTPATIENT)
Dept: GENERAL RADIOLOGY | Facility: OTHER | Age: 78
End: 2018-09-10
Attending: SURGERY
Payer: MEDICARE

## 2018-09-10 ENCOUNTER — APPOINTMENT (OUTPATIENT)
Dept: PHYSICAL THERAPY | Facility: OTHER | Age: 78
End: 2018-09-10
Attending: FAMILY MEDICINE
Payer: MEDICARE

## 2018-09-10 LAB
ANION GAP SERPL CALCULATED.3IONS-SCNC: 5 MMOL/L (ref 3–14)
BUN SERPL-MCNC: 16 MG/DL (ref 7–25)
CALCIUM SERPL-MCNC: 9.4 MG/DL (ref 8.6–10.3)
CHLORIDE SERPL-SCNC: 104 MMOL/L (ref 98–107)
CO2 SERPL-SCNC: 26 MMOL/L (ref 21–31)
CREAT SERPL-MCNC: 0.86 MG/DL (ref 0.6–1.2)
ERYTHROCYTE [DISTWIDTH] IN BLOOD BY AUTOMATED COUNT: 13.2 % (ref 10–15)
GFR SERPL CREATININE-BSD FRML MDRD: 64 ML/MIN/1.7M2
GLUCOSE SERPL-MCNC: 117 MG/DL (ref 70–105)
HCT VFR BLD AUTO: 35 % (ref 35–47)
HGB BLD-MCNC: 11.5 G/DL (ref 11.7–15.7)
MAGNESIUM SERPL-MCNC: 2.1 MG/DL (ref 1.9–2.7)
MCH RBC QN AUTO: 30.1 PG (ref 26.5–33)
MCHC RBC AUTO-ENTMCNC: 32.9 G/DL (ref 31.5–36.5)
MCV RBC AUTO: 92 FL (ref 78–100)
PLATELET # BLD AUTO: 167 10E9/L (ref 150–450)
POTASSIUM SERPL-SCNC: 4.4 MMOL/L (ref 3.5–5.1)
RBC # BLD AUTO: 3.82 10E12/L (ref 3.8–5.2)
SODIUM SERPL-SCNC: 135 MMOL/L (ref 134–144)
WBC # BLD AUTO: 7.7 10E9/L (ref 4–11)

## 2018-09-10 PROCEDURE — 97530 THERAPEUTIC ACTIVITIES: CPT | Mod: GP

## 2018-09-10 PROCEDURE — 97165 OT EVAL LOW COMPLEX 30 MIN: CPT | Mod: GO | Performed by: OCCUPATIONAL THERAPIST

## 2018-09-10 PROCEDURE — 85027 COMPLETE CBC AUTOMATED: CPT | Performed by: FAMILY MEDICINE

## 2018-09-10 PROCEDURE — G0378 HOSPITAL OBSERVATION PER HR: HCPCS

## 2018-09-10 PROCEDURE — 99220 ZZC INITIAL OBSERVATION CARE,LEVL III: CPT | Mod: 25 | Performed by: SURGERY

## 2018-09-10 PROCEDURE — G8988 SELF CARE GOAL STATUS: HCPCS | Mod: GO,CI | Performed by: OCCUPATIONAL THERAPIST

## 2018-09-10 PROCEDURE — G8987 SELF CARE CURRENT STATUS: HCPCS | Mod: GO,CI | Performed by: OCCUPATIONAL THERAPIST

## 2018-09-10 PROCEDURE — 80048 BASIC METABOLIC PNL TOTAL CA: CPT | Performed by: FAMILY MEDICINE

## 2018-09-10 PROCEDURE — 83735 ASSAY OF MAGNESIUM: CPT | Performed by: FAMILY MEDICINE

## 2018-09-10 PROCEDURE — 25000132 ZZH RX MED GY IP 250 OP 250 PS 637: Mod: GY | Performed by: FAMILY MEDICINE

## 2018-09-10 PROCEDURE — 97161 PT EVAL LOW COMPLEX 20 MIN: CPT | Mod: GP

## 2018-09-10 PROCEDURE — 97530 THERAPEUTIC ACTIVITIES: CPT | Mod: GO | Performed by: OCCUPATIONAL THERAPIST

## 2018-09-10 PROCEDURE — 40000193 ZZH STATISTIC PT WARD VISIT

## 2018-09-10 PROCEDURE — 36415 COLL VENOUS BLD VENIPUNCTURE: CPT | Performed by: FAMILY MEDICINE

## 2018-09-10 PROCEDURE — 40000133 ZZH STATISTIC OT WARD VISIT: Performed by: OCCUPATIONAL THERAPIST

## 2018-09-10 PROCEDURE — 97116 GAIT TRAINING THERAPY: CPT | Mod: GP

## 2018-09-10 PROCEDURE — G8978 MOBILITY CURRENT STATUS: HCPCS | Mod: GP,CI

## 2018-09-10 PROCEDURE — G8979 MOBILITY GOAL STATUS: HCPCS | Mod: GP,CI

## 2018-09-10 PROCEDURE — A9270 NON-COVERED ITEM OR SERVICE: HCPCS | Mod: GY | Performed by: FAMILY MEDICINE

## 2018-09-10 PROCEDURE — 97535 SELF CARE MNGMENT TRAINING: CPT | Mod: GO,XU | Performed by: OCCUPATIONAL THERAPIST

## 2018-09-10 PROCEDURE — 73502 X-RAY EXAM HIP UNI 2-3 VIEWS: CPT

## 2018-09-10 PROCEDURE — 25000128 H RX IP 250 OP 636: Performed by: FAMILY MEDICINE

## 2018-09-10 RX ORDER — TRAMADOL HYDROCHLORIDE 50 MG/1
50 TABLET ORAL EVERY 6 HOURS PRN
COMMUNITY
Start: 2018-07-09

## 2018-09-10 RX ORDER — ALLOPURINOL 100 MG/1
100 TABLET ORAL DAILY
COMMUNITY
Start: 2017-12-01

## 2018-09-10 RX ORDER — PRAVASTATIN SODIUM 20 MG
20 TABLET ORAL DAILY
COMMUNITY
End: 2023-01-01 | Stop reason: DRUGHIGH

## 2018-09-10 RX ORDER — PRAVASTATIN SODIUM 20 MG
20 TABLET ORAL DAILY
Status: DISCONTINUED | OUTPATIENT
Start: 2018-09-10 | End: 2018-09-11 | Stop reason: HOSPADM

## 2018-09-10 RX ORDER — NADOLOL 80 MG/1
80 TABLET ORAL DAILY
COMMUNITY
Start: 2018-02-16

## 2018-09-10 RX ORDER — AMLODIPINE BESYLATE 10 MG/1
10 TABLET ORAL DAILY
COMMUNITY

## 2018-09-10 RX ORDER — ISOSORBIDE MONONITRATE 30 MG/1
30 TABLET, EXTENDED RELEASE ORAL DAILY
COMMUNITY
Start: 2018-08-29 | End: 2023-01-01 | Stop reason: DRUGHIGH

## 2018-09-10 RX ORDER — SODIUM CHLORIDE 9 MG/ML
INJECTION, SOLUTION INTRAVENOUS CONTINUOUS
Status: DISCONTINUED | OUTPATIENT
Start: 2018-09-10 | End: 2018-09-11 | Stop reason: HOSPADM

## 2018-09-10 RX ORDER — LISINOPRIL 20 MG/1
20 TABLET ORAL DAILY
Status: ON HOLD | COMMUNITY
End: 2018-11-02

## 2018-09-10 RX ORDER — HYDROMORPHONE HYDROCHLORIDE 1 MG/ML
0.2 INJECTION, SOLUTION INTRAMUSCULAR; INTRAVENOUS; SUBCUTANEOUS
Status: DISCONTINUED | OUTPATIENT
Start: 2018-09-10 | End: 2018-09-11 | Stop reason: HOSPADM

## 2018-09-10 RX ADMIN — HYDROCODONE BITARTRATE AND ACETAMINOPHEN 2 TABLET: 5; 325 TABLET ORAL at 17:41

## 2018-09-10 RX ADMIN — HYDROCODONE BITARTRATE AND ACETAMINOPHEN 2 TABLET: 5; 325 TABLET ORAL at 07:36

## 2018-09-10 RX ADMIN — SODIUM CHLORIDE: 9 INJECTION, SOLUTION INTRAVENOUS at 06:00

## 2018-09-10 RX ADMIN — PRAVASTATIN SODIUM 20 MG: 20 TABLET ORAL at 11:26

## 2018-09-10 RX ADMIN — AMLODIPINE BESYLATE 10 MG: 10 TABLET ORAL at 11:28

## 2018-09-10 RX ADMIN — NADOLOL 80 MG: 20 TABLET ORAL at 11:26

## 2018-09-10 RX ADMIN — HYDROMORPHONE HYDROCHLORIDE 0.5 MG: 1 INJECTION, SOLUTION INTRAMUSCULAR; INTRAVENOUS; SUBCUTANEOUS at 01:03

## 2018-09-10 RX ADMIN — HYDROCODONE BITARTRATE AND ACETAMINOPHEN 2 TABLET: 5; 325 TABLET ORAL at 02:58

## 2018-09-10 RX ADMIN — PANTOPRAZOLE SODIUM 40 MG: 40 TABLET, DELAYED RELEASE ORAL at 07:32

## 2018-09-10 RX ADMIN — ACETAMINOPHEN 650 MG: 325 TABLET, FILM COATED ORAL at 13:54

## 2018-09-10 RX ADMIN — ALLOPURINOL 100 MG: 100 TABLET ORAL at 11:29

## 2018-09-10 RX ADMIN — LISINOPRIL 20 MG: 20 TABLET ORAL at 11:28

## 2018-09-10 RX ADMIN — ISOSORBIDE MONONITRATE 30 MG: 30 TABLET, EXTENDED RELEASE ORAL at 11:30

## 2018-09-10 RX ADMIN — HYDROCODONE BITARTRATE AND ACETAMINOPHEN 2 TABLET: 5; 325 TABLET ORAL at 22:24

## 2018-09-10 NOTE — DISCHARGE SUMMARY
"[facility]    Discharge Summary  Hospitalist    Date of Admission:  9/9/2018  Date of Discharge:  9/11/2018  Discharging Provider: Dr. DALJIT Blum  Date of Service (when I saw the patient): 09/11/18    Discharge Diagnoses   Principal Problem:    Head injury (9/9/2018)  Active Problems:    Coronary artery disease with stable angina pectoris (H) (2/7/2014)    S/P aortic valve replacement (2/7/2014)    Atrial fibrillation, chronic (H) (6/12/2017)    Long term (current) use of opiate analgesic (11/21/2017)    Essential hypertension (8/25/2006)    Fibromyalgia (10/20/2005)    Iron deficiency anemia due to chronic blood loss (1/20/2018)    Hypomagnesemia (9/9/2018)      History of Present Illness   Katya Alcala is an 78 year old female who was referred for admission after a fall. From the admission H&P: \"She relates that she had been out to lunch with her son for her birthday, which is today.  According to her current history, they were leaving the restaurant after lunch and she fell in the parking lot, striking her face and scratching up her glasses.  She sustained a hematoma of her right forehead and around her right eye.  According to the history given by the family to the nurse practitioner, she was stepping out of the car and fell stepping down from the curb.  Believes that she tripped.  She denies noticing any headache, lightheadedness, chest pain, diaphoresis, or nausea prior to the fall.  In any case, she was evaluated with a head CT which was negative for intracranial bleeding, and cervical spine CT which showed osteoarthrosis but no fracture.  She was found to have an initial magnesium of 1.3 and was given some repletion.  She lives alone and given this and her chronic anticoagulation it was felt most prudent to observe the patient in the hospital and recheck labs and neurologic exam in the morning.\"    Hospital Course   Katya Alcala was admitted on 9/9/2018.  The following problems " were addressed during her hospitalization:  Head injury  CT negative for bleed or fracture. Neuro checks remained stable and exam unchanged through the night and the following morning. Periorbital ecchymosis and subconjunctival hemorrhage with no evidence of hyphema/globe injury. Patient was seen by PT/OT for balance and safety evaluation. Hypomagnesemia was repleted per protocol. Hemoglobin remained stable and cardiac enzymes were normal.    Active Problems: all remained stable with no events or concerns    Coronary artery disease with stable angina pectoris (H)     S/P aortic valve replacement     Atrial fibrillation, chronic (H)     Long term (current) use of opiate analgesic     Essential hypertension     Fibromyalgia     Iron deficiency anemia due to chronic blood loss    Addendum:    Due to ongoing dizziness, patient remained in the hospital 1 extra night. She feels much better day of discharge.     Domingo Blum M.D. 9/11/2018  9:27 AM  [phone]        Significant Results and Procedures   No procedures    Pending Results   These results will be followed up at clinic followup.  Unresulted Labs Ordered in the Past 30 Days of this Admission     Date and Time Order Name Status Description    9/9/2018 1915 Urine Culture Aerobic Bacterial In process           Code Status   Full Code       Primary Care Physician   Lokesh Taylor    Physical Exam   Temp: 98.5  F (36.9  C) Temp src: Tympanic BP: 128/72 Pulse: 66 Heart Rate: 80 Resp: 18 SpO2: 94 % O2 Device: None (Room air)    Vitals:    09/09/18 1502   Weight: 63.5 kg (140 lb)     Vital Signs with Ranges  Temp:  [97.5  F (36.4  C)-99.1  F (37.3  C)] 98.5  F (36.9  C)  Pulse:  [66-98] 66  Heart Rate:  [78-87] 80  Resp:  [18-20] 18  BP: (128-162)/(59-89) 128/72  SpO2:  [85 %-98 %] 94 %  I/O last 3 completed shifts:  In: 75.8 [I.V.:75.8]  Out: 1350 [Urine:1350]    Constitutional: alert, OX3, NAD  Eyes: subconjunctival hematoma and periorbital hematoma  stable  ENT: hematoma R forehead stable; superficial abrasions, no deep laceration  Respiratory: lungs clear  Cardiovascular: irreg irreg, no murmur heard  GI: soft, no mass or HSM, no apparent tenderness    Discharge Disposition   Discharged to home  Condition at discharge: Stable    Consultations This Hospital Stay   PHYSICAL THERAPY ADULT IP CONSULT  OCCUPATIONAL THERAPY ADULT IP CONSULT    Time Spent on this Encounter   IStephanie, personally saw the patient today and spent less than or equal to 30 minutes discharging this patient.    Discharge Orders     Reason for your hospital stay   For observation following a fall and mild head injury     Follow-up and recommended labs and tests    Hospital Follow-up scheduled with Dr. Taylor at [facility] on Thursday September 13th @ 2:30.     Activity   Your activity upon discharge: activity as tolerated     Wound care and dressings   Instructions to care for your wound at home: ice to area for comfort and keep wound clean and dry.     Full Code     Diet   Follow this diet upon discharge: Orders Placed This Encounter     Combination Diet Low Saturated Fat Na <2400mg Diet, No Caffeine Diet       Discharge Medications   Current Discharge Medication List      CONTINUE these medications which have NOT CHANGED    Details   allopurinol (ZYLOPRIM) 100 MG tablet Take 100 mg by mouth daily      amLODIPine (NORVASC) 10 MG tablet Take 10 mg by mouth daily      apixaban ANTICOAGULANT (ELIQUIS) 5 MG tablet Take 5 mg by mouth 2 times daily      isosorbide mononitrate (IMDUR) 30 MG 24 hr tablet Take 30 mg by mouth daily      lisinopril (PRINIVIL/ZESTRIL) 20 MG tablet Take 20 mg by mouth daily      nadolol (CORGARD) 80 MG tablet Take 80 mg by mouth daily      Omega-3 Fatty Acids (OMEGA-3 CF) 1000 MG CAPS Take 2,000 mg by mouth daily      omeprazole (PRILOSEC) 20 MG CR capsule Take 20 mg by mouth daily      pravastatin (PRAVACHOL) 20 MG tablet Take 20 mg by mouth daily       traMADol (ULTRAM) 50 MG tablet Take 50 mg by mouth every 6 hours as needed for pain      diclofenac (VOLTAREN) 1 % GEL topical gel Apply 1 gram to elbow and 1 gram gram to wrist four times daily using enclosed dosing card.  Qty: 100 g, Refills: 1    Associated Diagnoses: Medial epicondylitis, right elbow; Tendinitis of right wrist      ergocalciferol (ERGOCALCIFEROL) 60961 UNITS capsule Take 50,000 Units by mouth      nitroGLYcerin (NITROSTAT) 0.4 MG sublingual tablet Do not crush. Dissolve 1 tablet under the tongue at 1st sign of angina. Repeat in 5 minutes till relief. Max of 3 tabs/15 minutes.           Allergies   Allergies   Allergen Reactions     Atorvastatin Other (See Comments)     muscle aches     Codeine GI Disturbance     Causes GI upset (scanned record).     Crestor [Rosuvastatin] Muscle Pain (Myalgia)     Doxycycline      nausea     Meticorten Other (See Comments)     Can't sleep     Penicillins      Distant history of a rash  Injection site reaction     Prednisone Other (See Comments)     insomnia     Simvastatin Muscle Pain (Myalgia)     Data   Most Recent 3 CBC's:  Recent Labs   Lab Test  09/10/18   0522  09/09/18   1608  11/26/17   2205   WBC  7.7  7.4  8.3   HGB  11.5*  11.5*  9.0*   MCV  92  93  86   PLT  167  161  204      Most Recent 3 BMP's:  Recent Labs   Lab Test  09/10/18   0522  09/09/18   1608  08/08/16   1106   NA  135  139  142   POTASSIUM  4.4  4.6  4.6   CHLORIDE  104  106  112*   CO2  26  26  25   BUN  16  21  22   CR  0.86  1.00  0.94   ANIONGAP  5  7  5   EMMA  9.4  9.8  9.2   GLC  117*  130*  128*     Most Recent 2 LFT's:  Recent Labs   Lab Test  09/09/18   1608  02/08/14   0500   AST  16  13   ALT  9  12   ALKPHOS  73  86   BILITOTAL  0.6  0.3       Most Recent 3 Troponins:  Recent Labs   Lab Test  09/09/18   2114  09/09/18   1620   TROPI  <0.030  <0.030       Results for orders placed or performed during the hospital encounter of 09/09/18   CT Head w/o Contrast    Narrative     PROCEDURE: CT HEAD W/O CONTRAST     HISTORY: fall, head injury; .    COMPARISON: None.    TECHNIQUE:  Helical images of the head from the foramen magnum to the  vertex were obtained without contrast.    FINDINGS: The ventricles and sulci are normal in volume. No acute  intracranial hemorrhage, mass effect, midline shift, hydrocephalus or  basilar cystern effacement are present.    The grey-white matter interface is preserved.    The calvarium is intact. The mastoid air cells are clear.  The  visualized paranasal sinuses are clear. There is a hematoma in the  right for head.      Impression    IMPRESSION: Normal brain      BLAKE BECKFORD MD   CT Cervical Spine w/o Contrast    Narrative    PROCEDURE: CT CERVICAL SPINE W/O CONTRAST 9/9/2018 3:35 PM    HISTORY: Fall, neck pain;     COMPARISONS: None.    Meds/Dose Given:    TECHNIQUE: CT scan cervical spine with sagittal coronal  reconstructions    FINDINGS: There are degenerative changes of the middle atlantoaxial  joint. Calcifications are seen in the transverse ligament of the  atlas. There is decrease in height in the C3 C4 C4 C5 C5 C6 disc.  There are degenerative changes of the cervical facet joints at C2-C3  C3-C4 on the right. There is atherosclerotic plaquing at the carotid  bifurcations. Otherwise the prevertebral soft tissues appear normal.         Impression    IMPRESSION: Degenerative changes in cervical spine no fractures    BLAKE BECKFORD MD   XR Chest Port 1 View    Narrative    PROCEDURE:  XR CHEST PORT 1 VW    HISTORY:  fall; .     COMPARISON:  2014    FINDINGS:   Postoperative changes of an aortic valve replacement are seen. The  pulmonary vasculature is normal.  There is a small area of ill-defined  increase in density in the upper portion of the right lung seen best  between the anterior aspects of the second and third ribs on the  right. No pleural effusion or pneumothorax.      Impression    IMPRESSION:  Small right upper lobe opacity.       BLAKE BECKFORD MD

## 2018-09-10 NOTE — PLAN OF CARE
Problem: PT General Care Plan  Goal: Gait (PT)  PT Gait  Discharge Planner PT   Patient plan for discharge: home with home care PT and family assist  Current status: requiring minimal assist for bed mobilities; CGA for transfers and gait using a 4ww  Barriers to return to prior living situation: fatigue, patient and instability  Recommendations for discharge: continued PT  Rationale for recommendations: to promote return to her prior level of independent, functional mobility        Entered by: Mack Bowman 09/10/2018 2:31 PM

## 2018-09-10 NOTE — PROGRESS NOTES
:    Called Marge at [facility] and provided update that anticipated discharge in now tomorrow, per MD in discharge planning meeting. Faxed orders. Marge will notify if new orders are needed tomorrow.  will continue to follow.     AGUEDA Peters on 9/10/2018 at 12:05 PM

## 2018-09-10 NOTE — PROGRESS NOTES
Called Dr. Rich and requested that he come see patient for Level 2 Trauma assessment.     Thi Serrano RN on 9/10/2018 at 9:31 AM

## 2018-09-10 NOTE — ED NOTES
Rounding done after receiving report from EDD Vu.   Patient c/o pain. Pain medication given per order.

## 2018-09-10 NOTE — PLAN OF CARE
Problem: Patient Care Overview  Goal: Plan of Care/Patient Progress Review  -diagnostic tests and consults completed and resulted   -vital signs normal or at patient baseline      Outcome: No Change  VS and Neuro's remain Intact and unchanged. Right eye is bruised and swollen. Left eye has bruising under bottom eye lid.  Pt has a golf ball size bump on Right side of forehead. Pt is A&Ox4 and is a 1 assist. Pt has had generalized pain and tylenol given.  Pt remains on tele.

## 2018-09-10 NOTE — H&P
[facility]    History and Physical  Hospitalist       Date of Admission:  9/9/2018    Assessment & Plan   Katya Alcala is a 78 year old female who presents with head trauma after a fall    Principal Problem:    Head injury    Assessment: Mild, CT negative for bleed or fracture, referred for observation because of chronic anticoagulation    Plan: Monitor closely with neuro checks.  Hold apixaban until stable  Active Problems:    Coronary artery disease with stable angina pectoris (H)    Assessment: No recent angina or nitroglycerin use    Plan: Continue current medications    S/P aortic valve replacement    Assessment: Stable     Plan: Continue usual cardiac medications    Atrial fibrillation, chronic (H)    Assessment: Has been stable and rate controlled    Plan: Resume apixaban when no active bleeding is apparent    Long term (current) use of opiate analgesic    Assessment: Uses tramadol, presumably for fibromyalgia pain    Plan: Resume in a.m. if patient remains neurologically intact    Essential hypertension    Assessment: Normotensive    Plan: Continue usual medications    Fibromyalgia    Assessment: With some muscle aching    Plan: Supportive care    Iron deficiency anemia due to chronic blood loss    Assessment: Appears stable per care everywhere chart    Plan: Recheck in morning    Hypomagnesemia    Assessment: Has received some repletion in ED    Plan: Continue electrolyte repletion protocol    # Pain Assessment:  Current Pain Score 9/9/2018   Pain score (0-10) 7   Pain location -   Pain descriptors -       DVT Prophylaxis: Low Risk/Ambulatory with no VTE prophylaxis indicated  Code Status: Prior    Stephanie Baumann    Primary Care Physician   Lokesh Taylor    Chief Complaint   Fall    History is obtained from the patient, ED provider, and chart review.    History of Present Illness   Katya Alcala is a 78 year old female who is referred for observation from the emergency  department.  She relates that she had been out to lunch with her son for her birthday, which is today.  According to her current history, they were leaving the restaurant after lunch and she fell in the parking lot, striking her face and scratching up her glasses.  She sustained a hematoma of her right forehead and around her right eye.  According to the history given by the family to the nurse practitioner, she was stepping out of the car and fell stepping down from the curb.  Believes that she tripped.  She denies noticing any headache, lightheadedness, chest pain, diaphoresis, or nausea prior to the fall.  In any case, she was evaluated with a head CT which was negative for intracranial bleeding, and cervical spine CT which showed osteoarthrosis but no fracture.  She was found to have an initial magnesium of 1.3 and was given some repletion.  She lives alone and given this and her chronic anticoagulation it was felt most prudent to observe the patient in the hospital and recheck labs and neurologic exam in the morning.    Past Medical History    I have reviewed this patient's medical history and updated it with pertinent information if needed.   Past Medical History:   Diagnosis Date     Atrial fibrillation, chronic (H) 6/12/2017     Coronary artery disease with stable angina pectoris (H) 2/7/2014    S/p 2 vessel bypass     Essential hypertension 8/25/2006    Overview:  IMO Update     Fibromyalgia 10/20/2005    Overview:      Gout 5/13/2010    Overview:  Updated per 10/1/17 IMO import     Hyperlipidemia 8/25/2006    Overview:  IMO Update 10/11     Hypertension 5/10/2004     Impaired fasting glucose 12/18/2008     Iron deficiency anemia due to chronic blood loss 1/20/2018     Long term (current) use of opiate analgesic 11/21/2017     S/P aortic valve replacement 2/7/2014    Overview:  IMO Update 10/11 Problem list name updated by automated process. Provider to review     S/P thoracic aortic aneurysm repair  4/22/2009    Overview:  IMO Update 10/11     Status post coronary artery bypass graft 4/22/2009    Overview:  IMO Update 10/11     Tubular adenoma of colon 3/9/2014       Past Surgical History   I have reviewed this patient's surgical history and updated it with pertinent information if needed.  Past Surgical History:   Procedure Laterality Date     AORTIC VALVE REPLACEMENT  05/2008    Mitroflow [employer] bioprosthesis     APPENDECTOMY       C OB/GYN PROCEDURE                          DATE:  1986    Vaginal granulation tissue removed     CARDIAC CATHERIZATION  01/04/2016     COLONOSCOPY      and polypectomy     HISTORY OF CABG  05/2008    Single vessel     HISTORY OF CATARACT REPAIR Right 05/09/2011     HISTORY OF THORACIC AORTIC ANEURYSM REPAIR  05/2008    Ascending aorta, tube graft     SIOBHAN/BSO  1984    Benign fibroid     TONSILLECTOMY & ADENOIDECTOMY  1949       Prior to Admission Medications   Prior to Admission Medications   Prescriptions Last Dose Informant Patient Reported? Taking?   ALLOPURINOL PO 9/8/2018 at Unknown time  Yes Yes   Sig: Take 100 mg by mouth daily   AmLODIPine Besylate (NORVASC PO) 9/8/2018 at Unknown time  Yes Yes   Sig: Take 10 mg by mouth daily   Apixaban (ELIQUIS PO) 9/9/2018 at Unknown time  Yes Yes   Sig: Take 5 mg by mouth 2 times daily    LISINOPRIL PO 9/8/2018 at Unknown time  Yes Yes   Sig: Take 20 mg by mouth daily   NADOLOL PO 9/8/2018 at Unknown time  Yes Yes   Sig: Take 80 mg by mouth daily   Omega-3 Fatty Acids (OMEGA-3 FISH OIL PO) 9/8/2018 at Unknown time  Yes Yes   Sig: Take 2 g by mouth daily   Omeprazole (PRILOSEC PO) 9/9/2018 at Unknown time  Yes Yes   Sig: Take 20 mg by mouth daily   PRAVASTATIN SODIUM PO 9/8/2018 at Unknown time  Yes Yes   Sig: Take 20 mg by mouth daily    TRAMADOL HCL PO 9/9/2018 at Unknown time  Yes Yes   Sig: Take 50 mg by mouth daily   diclofenac (VOLTAREN) 1 % GEL topical gel Unknown at Unknown time  No No   Sig: Apply 1 gram to elbow and 1 gram  gram to wrist four times daily using enclosed dosing card.   ergocalciferol (ERGOCALCIFEROL) 06153 UNITS capsule Unknown at Unknown time  Yes No   Sig: Take 50,000 Units by mouth   isosorbide mononitrate (IMDUR) 30 MG 24 hr tablet 9/8/2018 at Unknown time  Yes Yes   Sig: Take 30 mg by mouth daily   nitroGLYcerin (NITROSTAT) 0.4 MG sublingual tablet More than a month at Unknown time  Yes No   Sig: Do not crush. Dissolve 1 tablet under the tongue at 1st sign of angina. Repeat in 5 minutes till relief. Max of 3 tabs/15 minutes.      Facility-Administered Medications: None     Allergies   Allergies   Allergen Reactions     Atorvastatin Other (See Comments)     muscle aches     Codeine GI Disturbance     Causes GI upset (scanned record).     Doxycycline      nausea     Hmg-Coa-R Inhibitors      Meticorten Other (See Comments)     Can't sleep     Penicillins      Distant history of a rash       Social History   I have reviewed this patient's social history and updated it with pertinent information if needed. Katya NUÑEZ Cari  reports that she has never smoked. She has never used smokeless tobacco. She reports that she does not drink alcohol or use illicit drugs.    Family History   I have reviewed this patient's family history and updated it with pertinent information if needed.   Family History   Problem Relation Age of Onset     Diabetes Mother      Myocardial Infarction Father      50s     Kidney Cancer Brother      Diabetes Son        Review of Systems   Skin: Bruising left arm attributed to anticoagulant use  Eyes: See HPI  Ears/Nose/Throat: See HPI  Respiratory: No shortness of breath, dyspnea on exertion, cough, or hemoptysis  Cardiovascular: History of coronary artery disease, has not used nitroglycerin for 2 years, denies current complaints  Gastrointestinal: Complains of mild nausea, which has been ongoing per care everywhere chart review, and has had chronic GI blood loss related to  anticoagulation  Genitourinary: negative  Musculoskeletal: See HPI  Neurologic: negative  Psychiatric: negative  Hematologic/Lymphatic/Immunologic: Chronic blood loss anemia as above, on iron and has been stable  Endocrine: negative      Physical Exam   Temp: 97.5  F (36.4  C) Temp src: Tympanic BP: 142/89 Pulse: 77   Resp: 20 SpO2: 96 % O2 Device: None (Room air)    Vital Signs with Ranges  Temp:  [97.5  F (36.4  C)] 97.5  F (36.4  C)  Pulse:  [77] 77  Resp:  [20] 20  BP: (128-162)/(63-89) 142/89  SpO2:  [85 %-98 %] 96 %  140 lbs 0 oz    Constitutional: alert, uncomfortable-appearing older woman in no resp distress  Eyes: She has a hematoma of the right upper eyelid and periorbital tissue.  Globe itself is atraumatic, sclera white, and pupil reactive.  Left eye is atraumatic, normal pupil  HEENT: Hematoma with superficial abrasion over the right forehead.  Otherwise atraumatic.  TMs clear.  Nasal bones nontender and without crepitation.  Pharynx pink and moist, upper denture, lower dentition intact  Respiratory: Lungs clear with good air exchange  Cardiovascular: Irregularly irregular, no murmur heard  GI: Soft with no mass, tenderness, or HSM  Lymph/Hematologic: No cervical or supraclavicular nodes  Genitourinary: Deferred  Skin: Thin with scattered ecchymoses consistent with her anticoagulant use  Musculoskeletal: Full range of motion at all upper and lower extremity joints.  Some discomfort with R OM maneuvers of the left hip.  Strength intact.  Has been able to bear weight  Neurologic: Cranial nerves II through XII intact.  Moves all extremities.  No tremor.  Psychiatric: Alert and oriented x3, able to give a reasonable history, although with mild discrepancy between the history of accident that she told me and the history that she told the nurse practitioner.  Mood appears appropriate    Data   Data reviewed today:  I personally reviewed the EKG today showing Q waves in I and aVL, no old tracing available for  comparison. No acute ST-T changes    Recent Labs  Lab 09/09/18  1620 09/09/18  1608   WBC  --  7.4   HGB  --  11.5*   MCV  --  93   PLT  --  161   NA  --  139   POTASSIUM  --  4.6   CHLORIDE  --  106   CO2  --  26   BUN  --  21   CR  --  1.00   ANIONGAP  --  7   EMMA  --  9.8   GLC  --  130*   ALBUMIN  --  3.9   PROTTOTAL  --  6.5   BILITOTAL  --  0.6   ALKPHOS  --  73   ALT  --  9   AST  --  16   TROPI <0.030  --        Recent Results (from the past 24 hour(s))   CT Head w/o Contrast    Narrative    PROCEDURE: CT HEAD W/O CONTRAST     HISTORY: fall, head injury; .    COMPARISON: None.    TECHNIQUE:  Helical images of the head from the foramen magnum to the  vertex were obtained without contrast.    FINDINGS: The ventricles and sulci are normal in volume. No acute  intracranial hemorrhage, mass effect, midline shift, hydrocephalus or  basilar cystern effacement are present.    The grey-white matter interface is preserved.    The calvarium is intact. The mastoid air cells are clear.  The  visualized paranasal sinuses are clear. There is a hematoma in the  right for head.      Impression    IMPRESSION: Normal brain      BLAKE BECKFORD MD   CT Cervical Spine w/o Contrast    Narrative    PROCEDURE: CT CERVICAL SPINE W/O CONTRAST 9/9/2018 3:35 PM    HISTORY: Fall, neck pain;     COMPARISONS: None.    Meds/Dose Given:    TECHNIQUE: CT scan cervical spine with sagittal coronal  reconstructions    FINDINGS: There are degenerative changes of the middle atlantoaxial  joint. Calcifications are seen in the transverse ligament of the  atlas. There is decrease in height in the C3 C4 C4 C5 C5 C6 disc.  There are degenerative changes of the cervical facet joints at C2-C3  C3-C4 on the right. There is atherosclerotic plaquing at the carotid  bifurcations. Otherwise the prevertebral soft tissues appear normal.         Impression    IMPRESSION: Degenerative changes in cervical spine no fractures    BLAKE BECKFORD MD   XR Chest Port  1 View    Narrative    PROCEDURE:  XR CHEST PORT 1 VW    HISTORY:  fall; .     COMPARISON:  2014    FINDINGS:   Postoperative changes of an aortic valve replacement are seen. The  pulmonary vasculature is normal.  There is a small area of ill-defined  increase in density in the upper portion of the right lung seen best  between the anterior aspects of the second and third ribs on the  right. No pleural effusion or pneumothorax.      Impression    IMPRESSION:  Small right upper lobe opacity.      BLAKE BECKFORD MD

## 2018-09-10 NOTE — PLAN OF CARE
Problem: Patient Care Overview  Goal: Plan of Care/Patient Progress Review  -diagnostic tests and consults completed and resulted   -vital signs normal or at patient baseline      Discharge Planner OT   Patient plan for discharge: to return home with family assist and home care therapies   Current status: Pt complained of lightheadedness at eval, but able to ambulate with 4WW with CGA, toileted with SBA using GB's.   Barriers to return to prior living situation: lives alone, weakness and lightheaded at times   Recommendations for discharge: home with family assist and home care therapy   Rationale for recommendations: see above        Entered by: Emily Spivey 09/10/2018 3:09 PM

## 2018-09-10 NOTE — PROGRESS NOTES
09/10/18 1015   Quick Adds   Type of Visit Initial Occupational Therapy Evaluation   Living Environment   Lives With alone   Living Arrangements house   Functional Level Prior   Ambulation 0-->independent   Transferring 0-->independent   Toileting 0-->independent   Bathing 0-->independent   Dressing 0-->independent   Eating 0-->independent   Communication 0-->understands/communicates without difficulty   Swallowing 0-->swallows foods/liquids without difficulty   Cognition 0 - no cognition issues reported   Fall history within last six months yes   Number of times patient has fallen within last six months 1   Cognitive Status Examination   Orientation orientation to person, place and time   Level of Consciousness alert   Able to Follow Commands WNL/WFL   Personal Safety (Cognitive) WNL/WFL   Memory intact   Attention No deficits were identified   Visual Perception   Visual Perception (decreased vision with right eye due to hematoma )   Pain Assessment   Patient Currently in Pain No   Range of Motion (ROM)   ROM Quick Adds No deficits were identified   Transfer Skills   Transfer Comments (pt reports feeling lightheaded today )   Transfer Skill: Bed to Chair/Chair to Bed   Level of [place]: Bed to Chair contact guard   Physical Assist/Nonphysical Assist: Bed to Chair 1 person assist   Transfer Skill: Toilet Transfer   Level of [place]: Toilet contact guard   Physical Assist/Nonphysical Assist: Toilet 1 person assist   Toileting   Level of [place]: Toilet stand-by assist   Physical Assist/Nonphysical Assist: Toilet supervision   Assistive Device yael franz   Clinical Impression   Criteria for Skilled Therapeutic Interventions Met yes, treatment indicated   OT Diagnosis CHI/Weakness   Influenced by the following impairments weakness, lightheadedness    Assessment of Occupational Performance 1-3 Performance Deficits   Identified Performance Deficits mobility and self care    Clinical Decision Making  (Complexity) Low complexity   Therapy Frequency daily   Predicted Duration of Therapy Intervention (days/wks) 1-2 days    Anticipated Equipment Needs at Discharge shower chair   Anticipated Discharge Disposition Home with Home Therapy   Risks and Benefits of Treatment have been explained. Yes   Patient, Family & other staff in agreement with plan of care Yes   1x Eval Only-Outpatient/Observation Medicare G-Code   G-code Self Care   Self Care   Self Care: Current Status , Goal ,  Discharge -[place] Only- Modifier the same for all G-codes CI: 1-19% impairment   Total Evaluation Time   Total Evaluation Time (Minutes) 15

## 2018-09-10 NOTE — PLAN OF CARE
Problem: Patient Care Overview  Goal: Plan of Care/Patient Progress Review  -diagnostic tests and consults completed and resulted   -vital signs normal or at patient baseline      Outcome: Improving  VS WDL and Neuros WDL. Pt says she feels dizzy as she is sitting in the chair.  Left eye bruised and swollen.  Pt is A&Ox4 and using call light appropriately.

## 2018-09-10 NOTE — PLAN OF CARE
Problem: Patient Care Overview  Goal: Plan of Care/Patient Progress Review  Pt complains of pain rate 7/10, pt received NORCO with a slight decrease in pain to maybe a 6/10.   notified and ordered Dilaudid (Please see MAR).  Pt vitals stable.  Chantelle Durand RN.............................9/10/2018 1:34 AM

## 2018-09-10 NOTE — PHARMACY-ADMISSION MEDICATION HISTORY
Pharmacy -- Admission Medication Reconciliation    Prior to admission (PTA) medications were reviewed and the patient's PTA medication list was updated.    Sources Consulted: patient, Vanessa Jordan    The reliability of this Medication Reconciliation is: Reliability: Reliable    The following significant changes were made:  Voltaren gel REMOVED - patient no longer using  Ergocalciferol REMOVED - patient completed therapy    Note: patient Eliquis dose was confirmed with Vanessa.  Patient states she was recently switched from Xarelto due to GI bleed.  Note: patient states she takes her tramadol 50 mg once daily in the AM for her fibromyalgia    In addition, the patient's allergies were reviewed with the patient and updated as follows:   Allergies: Atorvastatin; Codeine; Crestor [rosuvastatin]; Doxycycline; Meticorten; Penicillins; Prednisone; and Simvastatin    The pharmacist has reviewed with the patient that all personal medications should be removed from the building or locked in the belongings safe.  Patient shall only take medications ordered by the physician and administered by the nursing staff.       Medication barriers identified: none   Medication adherence concerns: none   Understanding of emergency medications: carries nitroglycerin on her at all times    Shari Dunham [facility], 9/10/2018,  9:55 AM

## 2018-09-10 NOTE — PHARMACY-ADMISSION MEDICATION HISTORY
Pharmacy -- Admission Medication Reconciliation IN PROGRESS 24 hour note    Prior to admission (PTA) medications were reviewed and the patient's PTA medication list was updated.    Sources Consulted: Care Everywhere (nothing in [employer])  Faxed [facility] closed at time of note    Nursing removed rivaroxaban and added apixaban 2.5 mg bid  Per Care Everywhere it is 5 mg bid (please clarify in AM)    The reliability of this Medication Reconciliation is: Reliability: Unreliable    Complete in AM    Echo Dudley [facility], 9/9/2018,  8:02 PM

## 2018-09-10 NOTE — PROGRESS NOTES
:    Met with patient to discuss home care, per PT/OT's recommendation. Patient agreed to home care and selected [facility] from our home care choice sheet. Faxed referral to [facility] and discussed referral with Marge at [facility]. [facility] able to accept.  to fax home care orders when they are complete.     AGUEDA Peters on 9/10/2018 at 11:06 AM

## 2018-09-10 NOTE — PLAN OF CARE
Problem: Patient Care Overview  Goal: Plan of Care/Patient Progress Review  Pt arrived to the unit vitally stable.  Complains of pain and received meds (Please see MAR)  Neuro assessment WDL.    Chantelle Durand RN.............................9/10/2018 9:00 AM

## 2018-09-10 NOTE — PROGRESS NOTES
09/10/18 1400   Quick Adds   Type of Visit Initial PT Evaluation   Living Environment   Lives With alone   Living Arrangements house   Home Accessibility no concerns   Number of Stairs to Enter Home 2   Number of Stairs Within Home 13   Transportation Available car   Functional Level Prior   Ambulation 0-->independent   Transferring 0-->independent   Toileting 0-->independent   Communication 0-->understands/communicates without difficulty   Swallowing 0-->swallows foods/liquids without difficulty   Cognition 0 - no cognition issues reported   Fall history within last six months yes   Number of times patient has fallen within last six months 1   Which of the above functional risks had a recent onset or change? ambulation;transferring   General Information   Referring Physician Ibis    Patient/Family Goals Statement return home   Precautions/Limitations fall precautions   Weight-Bearing Status - LLE full weight-bearing   Weight-Bearing Status - RLE full weight-bearing   Cognitive Status Examination   Orientation orientation to person, place and time   Level of Consciousness lethargic/somnolent   Follows Commands and Answers Questions 100% of the time   Personal Safety and Judgment intact   Memory intact   Pain Assessment   Patient Currently in Pain Yes, see Vital Sign flowsheet   Integumentary/Edema   Integumentary/Edema Comments edema and bruising on right side of face   Posture    Posture Not impaired   Range of Motion (ROM)   ROM Comment WFL LEs   Strength   Strength Comments WFL LEs   Bed Mobility   Bed Mobility Comments minimal assist    Transfer Skills   Transfer Comments CGA with use of 4ww   Gait   Gait Comments CGA wtih use of 4ww   Balance   Balance Comments good with use of 4ww, only fair without use of AD   Sensory Examination   Sensory Perception Comments appears intact to light touch   Coordination   Coordination no deficits were identified   General Therapy Interventions   Planned Therapy  Interventions bed mobility training;gait training;transfer training   Clinical Impression   Criteria for Skilled Therapeutic Intervention yes, treatment indicated   PT Diagnosis impaired mobility   Influenced by the following impairments fatigue and pain   Functional limitations due to impairments activity tolerance and gait stability   Clinical Presentation Stable/Uncomplicated   Clinical Decision Making (Complexity) Low complexity   Therapy Frequency` daily   Anticipated Equipment Needs at Discharge walker   Anticipated Discharge Disposition Home with Assist;Home with Home Therapy   Risk & Benefits of therapy have been explained Yes   Patient, Family & other staff in agreement with plan of care Yes   1x Eval Only-Outpatient/Observation Medicare G-Code   G-code Mobility: Walking & Moving Around   Mobility: Walking & Moving Around   Mobility: Current Status , Goal , Discharge -[place] Only- Modifier the same for all G-codes CI: 1-19% impairment   Total Evaluation Time   Total Evaluation Time (Minutes) 15

## 2018-09-10 NOTE — PLAN OF CARE
Problem: Patient Care Overview  Goal: Plan of Care/Patient Progress Review  -diagnostic tests and consults completed and resulted   -vital signs normal or at patient baseline     Pt vitally stable as well as neuro assessment.  Pt labs drawn and magnesium returned to within reference range. Labs ordered this AM.  Currently working on pain control with dilaudid and khushbu.    Chantelle Durand RN.............................9/10/2018 5:21 AM

## 2018-09-10 NOTE — PROGRESS NOTES
Pt refused her morning meds due to her being OBS. She does not want to pay out of pocket for them. Karin informed her she talked with Iris, who is in charge  Of OBS, that [employer] may cover it but if not 50% of the cost will have to be out of pocket.  Pt still refused.

## 2018-09-10 NOTE — CONSULTS
HISTORY AND PHYSICAL    Katya Alcala   [address]   [address]  [facility] [address]  78 year old female  Admission Date/Time: 9/9/2018  3:10 PM    Time trauma surgeon notified: 0758  Time seen by trauma surgeon: 0812    Primary CareProvider / Referring Physician: Iibs    Informant: patient     CHIEF COMPLAINT: face / eye pain     HPI:  Ms Alcala presents for a fall from standing. Last evening she was leaving a restaurant and fell and hit her face on the concrete. She does not remember the circumstances surrounding the fall, but she believes she tripped on a curb and fell. She denies chest pain or feeling dizzy. She denies LOC. She has a history of heart disease, s/p CABG and aortic valve replacement. She takes apixiban for a fib. She denies history of falls. Immediately after the fall she was completely lucid and was able to ambulate on her own. She was brought in to the ED for evaluation and kept for observation due to concern for intracranial injury and risk of bleeding.   This morning she says she feels overall soreness. She denies neck pain or pain in her chest / ribs. She denies abdominal pain. She is tolerating some PO intake, but appetite is low. She says she has some left hip pain. She denies pain in her arms, hands or shoulders from the fall. She denies trouble breathing this morning. Sats are normal with no supplemental oxygen.       REVIEW OF SYSTEMS:    Review Of Systems  Skin: negative, thin skin, abrasion to the right eye   Eyes: swelling and ecchymosis about the right eye, it is shut due to swelling. Visual acuity intact. She wears corrective lenses   Ears/Nose/Throat: negative  Respiratory: No shortness of breath, dyspnea on exertion, cough, or hemoptysis  Cardiovascular: history of MI, s/p CABG and aortic valve replacement   Gastrointestinal: negative  Genitourinary: negative  Musculoskeletal: left hip pain   Neurologic: negative  Psychiatric: negative  Hematologic/Lymphatic/Immunologic:  takes blood thinners   Endocrine: negative      Past Medical History:   Diagnosis Date     Atrial fibrillation, chronic (H) 6/12/2017     Coronary artery disease with stable angina pectoris (H) 2/7/2014    S/p 2 vessel bypass     Essential hypertension 8/25/2006    Overview:  IMO Update     Fibromyalgia 10/20/2005    Overview:      Gout 5/13/2010    Overview:  Updated per 10/1/17 IMO import     Hyperlipidemia 8/25/2006    Overview:  IMO Update 10/11     Hypertension 5/10/2004     Impaired fasting glucose 12/18/2008     Iron deficiency anemia due to chronic blood loss 1/20/2018     Long term (current) use of opiate analgesic 11/21/2017     S/P aortic valve replacement 2/7/2014    Overview:  IMO Update 10/11 Problem list name updated by automated process. Provider to review     S/P thoracic aortic aneurysm repair 4/22/2009    Overview:  IMO Update 10/11     Status post coronary artery bypass graft 4/22/2009    Overview:  IMO Update 10/11     Tubular adenoma of colon 3/9/2014       Past Surgical History:   Procedure Laterality Date     AORTIC VALVE REPLACEMENT  05/2008    Mitroflow [employer] bioprosthesis     APPENDECTOMY       C OB/GYN PROCEDURE                          DATE:  1986    Vaginal granulation tissue removed     CARDIAC CATHERIZATION  01/04/2016     COLONOSCOPY      and polypectomy     HISTORY OF CABG  05/2008    Single vessel     HISTORY OF CATARACT REPAIR Right 05/09/2011     HISTORY OF THORACIC AORTIC ANEURYSM REPAIR  05/2008    Ascending aorta, tube graft     SIOBHAN/BSO  1984    Benign fibroid     TONSILLECTOMY & ADENOIDECTOMY  1949       Family History   Problem Relation Age of Onset     Diabetes Mother      Myocardial Infarction Father      50s     Kidney Cancer Brother      Diabetes Son        Social History   Substance Use Topics     Smoking status: Never Smoker     Smokeless tobacco: Never Used     Alcohol use No         No current facility-administered medications on file prior to encounter.    Current Outpatient Prescriptions on File Prior to Encounter:  diclofenac (VOLTAREN) 1 % GEL topical gel Apply 1 gram to elbow and 1 gram gram to wrist four times daily using enclosed dosing card.   ergocalciferol (ERGOCALCIFEROL) 13887 UNITS capsule Take 50,000 Units by mouth   nitroGLYcerin (NITROSTAT) 0.4 MG sublingual tablet Do not crush. Dissolve 1 tablet under the tongue at 1st sign of angina. Repeat in 5 minutes till relief. Max of 3 tabs/15 minutes.   [DISCONTINUED] AmLODIPine Besylate (NORVASC PO) Take 10 mg by mouth daily   [DISCONTINUED] isosorbide mononitrate (IMDUR) 30 MG 24 hr tablet Take 30 mg by mouth daily   [DISCONTINUED] LISINOPRIL PO Take 20 mg by mouth daily   [DISCONTINUED] NADOLOL PO Take 80 mg by mouth daily   [DISCONTINUED] PRAVASTATIN SODIUM PO Take 20 mg by mouth daily          ALLERGIES/SENSITIVITIES:   Allergies   Allergen Reactions     Atorvastatin Other (See Comments)     muscle aches     Codeine GI Disturbance     Causes GI upset (scanned record).     Crestor [Rosuvastatin] Muscle Pain (Myalgia)     Doxycycline      nausea     Meticorten Other (See Comments)     Can't sleep     Penicillins      Distant history of a rash  Injection site reaction     Prednisone Other (See Comments)     insomnia     Simvastatin Muscle Pain (Myalgia)         PHYSICAL EXAM:    Temp: 98.5  F (36.9  C) Temp  Min: 97.5  F (36.4  C)  Max: 99.1  F (37.3  C)  Resp: 18 Resp  Min: 18  Max: 20  SpO2: 94 % SpO2  Min: 85 %  Max: 98 %  Pulse: 66 Pulse  Min: 66  Max: 98  Heart Rate: 80 Heart Rate  Min: 78  Max: 87  BP: 128/72 Systolic (24hrs), Av , Min:128 , Max:162   Diastolic (24hrs), Av, Min:59, Max:89      PHYSICAL EXAMINATION  General: alert, mild distress, cooperative  Skin: thin skin  Head: hematoma above right eye   Eye: right eye swollen shut with ecchymosis  HENT: mild tenderness about right eye, no crepitus over facial bones, teeth align. No carotid bruit, no cerivical nodes, thyroid normal, no  midline neck pain, normal ROM  Chest/Lungs: No increased work of breathing, lungs are clear to auscultation bilaterally, no rib pain to palpation    CV: irregular heart beat, soft systolic murmur   Abdomen: No distension, pain, masses   Ortho: mild left hip pain to palpation, pelvis   Neuro: Appropriate for age, normal speech and mentation       LABORATORY:  Recent Labs   Lab Test  09/10/18   0522  09/09/18   1608   02/08/14   0500   02/06/14   2300   WBC  7.7  7.4   < >  8.0   --   10.7   HGB  11.5*  11.5*   < >  10.3*   < >  11.6*   MCV  92  93   < >  91   --   89   PLT  167  161   < >  181   --   197   INR   --    --    --   0.98   --   0.92    < > = values in this interval not displayed.       Recent Labs   Lab Test  09/10/18   0522  09/09/18   1608   POTASSIUM  4.4  4.6   CHLORIDE  104  106   BUN  16  21         IMAGING:  CT Head: The ventricles and sulci are normal in volume. No acute  intracranial hemorrhage, mass effect, midline shift, hydrocephalus or  basilar cystern effacement are present.     The grey-white matter interface is preserved.     The calvarium is intact. The mastoid air cells are clear.  The  visualized paranasal sinuses are clear. There is a hematoma in the  right for head.  CT Cspine: There are degenerative changes of the middle atlantoaxial  joint. Calcifications are seen in the transverse ligament of the  atlas. There is decrease in height in the C3 C4 C4 C5 C5 C6 disc.  There are degenerative changes of the cervical facet joints at C2-C3  C3-C4 on the right. There is atherosclerotic plaquing at the carotid  bifurcations. Otherwise the prevertebral soft tissues appear normal.  CXR: Postoperative changes of an aortic valve replacement are seen. The  pulmonary vasculature is normal.  There is a small area of ill-defined  increase in density in the upper portion of the right lung seen best  between the anterior aspects of the second and third ribs on the  right. No pleural effusion or  pneumothorax.      ASSESSMENT/INJURIES:   Ms Alcala is a 78 year old female who is status post ground-level fall. Injuries include, abrasion to right forehead and ecchymosis and swelling to right eye. No reason to suspect intracranial bleeding since head CT.     PLAN:  - Left hip Xray  - PT / OT evaluation   - OK to restart apixiban   - No follow up necessary from surgery.         Principal Problem:    Head injury  Active Problems:    Coronary artery disease with stable angina pectoris (H)    S/P aortic valve replacement    Atrial fibrillation, chronic (H)    Long term (current) use of opiate analgesic    Essential hypertension    Fibromyalgia    Iron deficiency anemia due to chronic blood loss    Hypomagnesemia        Travis Rich MD ...................  9/10/2018   8:21 AM

## 2018-09-11 ENCOUNTER — APPOINTMENT (OUTPATIENT)
Dept: PHYSICAL THERAPY | Facility: OTHER | Age: 78
End: 2018-09-11
Payer: MEDICARE

## 2018-09-11 VITALS
RESPIRATION RATE: 16 BRPM | TEMPERATURE: 98.9 F | HEART RATE: 66 BPM | BODY MASS INDEX: 26.49 KG/M2 | OXYGEN SATURATION: 97 % | DIASTOLIC BLOOD PRESSURE: 75 MMHG | SYSTOLIC BLOOD PRESSURE: 133 MMHG | HEIGHT: 62 IN | WEIGHT: 143.96 LBS

## 2018-09-11 LAB
MAGNESIUM SERPL-MCNC: 1.5 MG/DL (ref 1.9–2.7)
POTASSIUM SERPL-SCNC: 4.9 MMOL/L (ref 3.5–5.1)

## 2018-09-11 PROCEDURE — G8979 MOBILITY GOAL STATUS: HCPCS | Mod: GP,CI

## 2018-09-11 PROCEDURE — 25000125 ZZHC RX 250: Performed by: FAMILY MEDICINE

## 2018-09-11 PROCEDURE — 40000193 ZZH STATISTIC PT WARD VISIT

## 2018-09-11 PROCEDURE — 99217 ZZC OBSERVATION CARE DISCHARGE: CPT | Performed by: INTERNAL MEDICINE

## 2018-09-11 PROCEDURE — G0378 HOSPITAL OBSERVATION PER HR: HCPCS

## 2018-09-11 PROCEDURE — 83735 ASSAY OF MAGNESIUM: CPT | Performed by: INTERNAL MEDICINE

## 2018-09-11 PROCEDURE — G8988 SELF CARE GOAL STATUS: HCPCS | Mod: GO,CI | Performed by: OCCUPATIONAL THERAPIST

## 2018-09-11 PROCEDURE — 25000132 ZZH RX MED GY IP 250 OP 250 PS 637: Mod: GY | Performed by: FAMILY MEDICINE

## 2018-09-11 PROCEDURE — 25000128 H RX IP 250 OP 636: Performed by: FAMILY MEDICINE

## 2018-09-11 PROCEDURE — G8989 SELF CARE D/C STATUS: HCPCS | Mod: GO,CI | Performed by: OCCUPATIONAL THERAPIST

## 2018-09-11 PROCEDURE — A9270 NON-COVERED ITEM OR SERVICE: HCPCS | Mod: GY | Performed by: FAMILY MEDICINE

## 2018-09-11 PROCEDURE — 40000133 ZZH STATISTIC OT WARD VISIT: Performed by: OCCUPATIONAL THERAPIST

## 2018-09-11 PROCEDURE — G8980 MOBILITY D/C STATUS: HCPCS | Mod: GP,CI

## 2018-09-11 PROCEDURE — 97116 GAIT TRAINING THERAPY: CPT | Mod: GP

## 2018-09-11 PROCEDURE — 97535 SELF CARE MNGMENT TRAINING: CPT | Mod: GO | Performed by: OCCUPATIONAL THERAPIST

## 2018-09-11 PROCEDURE — 36415 COLL VENOUS BLD VENIPUNCTURE: CPT | Performed by: FAMILY MEDICINE

## 2018-09-11 PROCEDURE — 84132 ASSAY OF SERUM POTASSIUM: CPT | Performed by: FAMILY MEDICINE

## 2018-09-11 PROCEDURE — 83735 ASSAY OF MAGNESIUM: CPT | Performed by: FAMILY MEDICINE

## 2018-09-11 RX ADMIN — MAGNESIUM SULFATE HEPTAHYDRATE 4 G: 40 INJECTION, SOLUTION INTRAVENOUS at 07:57

## 2018-09-11 RX ADMIN — HYDROCODONE BITARTRATE AND ACETAMINOPHEN 2 TABLET: 5; 325 TABLET ORAL at 11:16

## 2018-09-11 RX ADMIN — PANTOPRAZOLE SODIUM 40 MG: 40 TABLET, DELAYED RELEASE ORAL at 07:55

## 2018-09-11 RX ADMIN — ONDANSETRON 4 MG: 4 TABLET, ORALLY DISINTEGRATING ORAL at 05:32

## 2018-09-11 NOTE — PROGRESS NOTES
:    Called Marge from [facility] and provided update that patient is discharging today, per MD. Home care orders that were faxed yesterday are sufficient, per Marge. No further needs at this time.     AGUEDA Peters on 9/11/2018 at 8:56 AM

## 2018-09-11 NOTE — PLAN OF CARE
Problem: Patient Care Overview  Goal: Plan of Care/Patient Progress Review  -diagnostic tests and consults completed and resulted   -vital signs normal or at patient baseline      Outcome: Improving  Patient reported generalized pain of a 5-6 out of 10 throughout shift. Norco administered per MAR. Alert and oriented x 4. Hand  and ankle  strong. Denies dizziness. Ambulated to bathroom x 3. Output of 750 mL. Patient reported nausea this AM. Zofran administered per MAR. VS 98.3, 16, 82, 122/64, 94% RA  Shobha Paredes RN on 9/11/2018 at 6:11 AM

## 2018-09-11 NOTE — PROGRESS NOTES
Discharge Note    Data:  Katya Alcala has been Discharged to home at 1135 via wheel chair accompanied by son and RN.      Action:  Written discharge/follow-up instructions were provided to other:Patient. Prescriptions: were written and sent with patient  were sent to patient's pharmacy. Belongings sent with patient.Equipment None.     Response:  other:Patient verbalized understanding of discharge instructions, reason for discharge, and necessary follow-up appointments.

## 2018-09-11 NOTE — PLAN OF CARE
Problem: Patient Care Overview  Goal: Plan of Care/Patient Progress Review  -diagnostic tests and consults completed and resulted   -vital signs normal or at patient baseline       Occupational Therapy Discharge Summary    Reason for therapy discharge:    Discharged to home with home therapy.    Progress towards therapy goal(s). See goals on Care Plan in [facility] electronic health record for goal details.  Goals partially met.  Barriers to achieving goals:   discharge from facility.    Therapy recommendation(s):    Continued therapy is recommended.  Rationale/Recommendations:  home safety and equipment needs assessed .

## 2018-09-11 NOTE — PLAN OF CARE
Problem: Patient Care Overview  Goal: Plan of Care/Patient Progress Review  -diagnostic tests and consults completed and resulted   -vital signs normal or at patient baseline      Physical Therapy Discharge Summary    Reason for therapy discharge:    Medicare G-code: Patient did not attend a final scheduled session prior to discharge. Unable to determine discharge functional status.    Progress towards therapy goal(s). See goals on Care Plan in Epic electronic health record for goal details.  Goals partially met.  Barriers to achieving goals:   discharge from facility.    Therapy recommendation(s):    Continued therapy is recommended.  Rationale/Recommendations:  home care PT to follow to promote return to her prior level of functional mobility.

## 2018-09-11 NOTE — PROGRESS NOTES
Patient signed and received copy of the GERONIMO letter at 0905.    Michell Kapadia on 9/11/2018 at 9:09 AM

## 2018-09-12 LAB
BACTERIA SPEC CULT: NO GROWTH
SPECIMEN SOURCE: NORMAL

## 2018-10-31 ENCOUNTER — HOSPITAL ENCOUNTER (INPATIENT)
Facility: HOSPITAL | Age: 78
LOS: 1 days | Discharge: HOME | DRG: 641 | End: 2018-11-02
Attending: INTERNAL MEDICINE | Admitting: INTERNAL MEDICINE
Payer: MEDICARE

## 2018-10-31 PROBLEM — E87.5 HYPERKALEMIA: Status: ACTIVE | Noted: 2018-10-31

## 2018-10-31 LAB
ANION GAP SERPL CALCULATED.3IONS-SCNC: 4 MMOL/L (ref 3–14)
BUN SERPL-MCNC: 35 MG/DL (ref 7–30)
CALCIUM SERPL-MCNC: 10.2 MG/DL (ref 8.5–10.1)
CHLORIDE SERPL-SCNC: 112 MMOL/L (ref 94–109)
CO2 SERPL-SCNC: 21 MMOL/L (ref 20–32)
CREAT SERPL-MCNC: 2.14 MG/DL (ref 0.52–1.04)
GFR SERPL CREATININE-BSD FRML MDRD: 22 ML/MIN/1.7M2
GLUCOSE BLDC GLUCOMTR-MCNC: 130 MG/DL (ref 70–99)
GLUCOSE BLDC GLUCOMTR-MCNC: 136 MG/DL (ref 70–99)
GLUCOSE BLDC GLUCOMTR-MCNC: 140 MG/DL (ref 70–99)
GLUCOSE BLDC GLUCOMTR-MCNC: 153 MG/DL (ref 70–99)
GLUCOSE BLDC GLUCOMTR-MCNC: 163 MG/DL (ref 70–99)
GLUCOSE BLDC GLUCOMTR-MCNC: 177 MG/DL (ref 70–99)
GLUCOSE BLDC GLUCOMTR-MCNC: 262 MG/DL (ref 70–99)
GLUCOSE SERPL-MCNC: 101 MG/DL (ref 70–99)
MAGNESIUM SERPL-MCNC: 1.8 MG/DL (ref 1.6–2.3)
POTASSIUM SERPL-SCNC: 5.2 MMOL/L (ref 3.4–5.3)
POTASSIUM SERPL-SCNC: 5.2 MMOL/L (ref 3.4–5.3)
POTASSIUM SERPL-SCNC: 6.7 MMOL/L (ref 3.4–5.3)
SODIUM SERPL-SCNC: 137 MMOL/L (ref 133–144)

## 2018-10-31 PROCEDURE — G0378 HOSPITAL OBSERVATION PER HR: HCPCS

## 2018-10-31 PROCEDURE — 25800025 ZZH RX 258: Performed by: INTERNAL MEDICINE

## 2018-10-31 PROCEDURE — 40000786 ZZHCL STATISTIC ACTIVE MRSA SURVEILLANCE CULTURE: Performed by: INTERNAL MEDICINE

## 2018-10-31 PROCEDURE — 84132 ASSAY OF SERUM POTASSIUM: CPT | Performed by: INTERNAL MEDICINE

## 2018-10-31 PROCEDURE — 83735 ASSAY OF MAGNESIUM: CPT | Performed by: INTERNAL MEDICINE

## 2018-10-31 PROCEDURE — 99223 1ST HOSP IP/OBS HIGH 75: CPT | Mod: AI | Performed by: INTERNAL MEDICINE

## 2018-10-31 PROCEDURE — 93005 ELECTROCARDIOGRAM TRACING: CPT

## 2018-10-31 PROCEDURE — 25000125 ZZHC RX 250: Performed by: INTERNAL MEDICINE

## 2018-10-31 PROCEDURE — 25000128 H RX IP 250 OP 636: Performed by: INTERNAL MEDICINE

## 2018-10-31 PROCEDURE — 25000132 ZZH RX MED GY IP 250 OP 250 PS 637: Mod: GY | Performed by: INTERNAL MEDICINE

## 2018-10-31 PROCEDURE — 25000131 ZZH RX MED GY IP 250 OP 636 PS 637: Mod: GY | Performed by: INTERNAL MEDICINE

## 2018-10-31 PROCEDURE — 93010 ELECTROCARDIOGRAM REPORT: CPT | Performed by: INTERNAL MEDICINE

## 2018-10-31 PROCEDURE — A9270 NON-COVERED ITEM OR SERVICE: HCPCS | Mod: GY | Performed by: INTERNAL MEDICINE

## 2018-10-31 PROCEDURE — 36415 COLL VENOUS BLD VENIPUNCTURE: CPT | Performed by: INTERNAL MEDICINE

## 2018-10-31 PROCEDURE — 80048 BASIC METABOLIC PNL TOTAL CA: CPT | Performed by: INTERNAL MEDICINE

## 2018-10-31 PROCEDURE — 00000146 ZZHCL STATISTIC GLUCOSE BY METER IP

## 2018-10-31 RX ORDER — NALOXONE HYDROCHLORIDE 0.4 MG/ML
.1-.4 INJECTION, SOLUTION INTRAMUSCULAR; INTRAVENOUS; SUBCUTANEOUS
Status: DISCONTINUED | OUTPATIENT
Start: 2018-10-31 | End: 2018-11-02 | Stop reason: HOSPADM

## 2018-10-31 RX ORDER — SODIUM CHLORIDE, SODIUM LACTATE, POTASSIUM CHLORIDE, CALCIUM CHLORIDE 600; 310; 30; 20 MG/100ML; MG/100ML; MG/100ML; MG/100ML
INJECTION, SOLUTION INTRAVENOUS CONTINUOUS
Status: DISCONTINUED | OUTPATIENT
Start: 2018-10-31 | End: 2018-10-31

## 2018-10-31 RX ORDER — ISOSORBIDE MONONITRATE 30 MG/1
30 TABLET, EXTENDED RELEASE ORAL DAILY
Status: DISCONTINUED | OUTPATIENT
Start: 2018-10-31 | End: 2018-11-02 | Stop reason: HOSPADM

## 2018-10-31 RX ORDER — ONDANSETRON 2 MG/ML
4 INJECTION INTRAMUSCULAR; INTRAVENOUS EVERY 6 HOURS PRN
Status: DISCONTINUED | OUTPATIENT
Start: 2018-10-31 | End: 2018-11-02 | Stop reason: HOSPADM

## 2018-10-31 RX ORDER — PRAVASTATIN SODIUM 20 MG
20 TABLET ORAL DAILY
Status: DISCONTINUED | OUTPATIENT
Start: 2018-10-31 | End: 2018-11-02 | Stop reason: HOSPADM

## 2018-10-31 RX ORDER — AMLODIPINE BESYLATE 10 MG/1
10 TABLET ORAL DAILY
Status: DISCONTINUED | OUTPATIENT
Start: 2018-10-31 | End: 2018-11-02 | Stop reason: HOSPADM

## 2018-10-31 RX ORDER — NADOLOL 40 MG/1
80 TABLET ORAL DAILY
Status: DISCONTINUED | OUTPATIENT
Start: 2018-10-31 | End: 2018-11-02 | Stop reason: HOSPADM

## 2018-10-31 RX ORDER — ONDANSETRON 4 MG/1
4 TABLET, ORALLY DISINTEGRATING ORAL EVERY 6 HOURS PRN
Status: DISCONTINUED | OUTPATIENT
Start: 2018-10-31 | End: 2018-11-02 | Stop reason: HOSPADM

## 2018-10-31 RX ORDER — DEXTROSE MONOHYDRATE 25 G/50ML
25-50 INJECTION, SOLUTION INTRAVENOUS
Status: DISCONTINUED | OUTPATIENT
Start: 2018-10-31 | End: 2018-11-02 | Stop reason: HOSPADM

## 2018-10-31 RX ORDER — SODIUM POLYSTYRENE SULFONATE 15 G/60ML
15 SUSPENSION ORAL; RECTAL DAILY
Status: COMPLETED | OUTPATIENT
Start: 2018-10-31 | End: 2018-11-01

## 2018-10-31 RX ORDER — DEXTROSE MONOHYDRATE 25 G/50ML
25 INJECTION, SOLUTION INTRAVENOUS ONCE
Status: COMPLETED | OUTPATIENT
Start: 2018-10-31 | End: 2018-10-31

## 2018-10-31 RX ORDER — ALLOPURINOL 100 MG/1
100 TABLET ORAL DAILY
Status: DISCONTINUED | OUTPATIENT
Start: 2018-10-31 | End: 2018-11-02 | Stop reason: HOSPADM

## 2018-10-31 RX ORDER — ACETAMINOPHEN 325 MG/1
650 TABLET ORAL EVERY 4 HOURS PRN
Status: DISCONTINUED | OUTPATIENT
Start: 2018-10-31 | End: 2018-11-02 | Stop reason: HOSPADM

## 2018-10-31 RX ORDER — LIDOCAINE 40 MG/G
CREAM TOPICAL
Status: DISCONTINUED | OUTPATIENT
Start: 2018-10-31 | End: 2018-11-02 | Stop reason: HOSPADM

## 2018-10-31 RX ORDER — DEXTROSE MONOHYDRATE 100 MG/ML
INJECTION, SOLUTION INTRAVENOUS CONTINUOUS
Status: DISCONTINUED | OUTPATIENT
Start: 2018-10-31 | End: 2018-10-31

## 2018-10-31 RX ORDER — CALCIUM CARBONATE 300MG(750)
400 TABLET,CHEWABLE ORAL DAILY
COMMUNITY
End: 2023-01-01

## 2018-10-31 RX ORDER — TRAMADOL HYDROCHLORIDE 50 MG/1
50 TABLET ORAL EVERY 6 HOURS PRN
Status: DISCONTINUED | OUTPATIENT
Start: 2018-10-31 | End: 2018-11-02 | Stop reason: HOSPADM

## 2018-10-31 RX ORDER — ACETAMINOPHEN 650 MG/1
650 SUPPOSITORY RECTAL EVERY 4 HOURS PRN
Status: DISCONTINUED | OUTPATIENT
Start: 2018-10-31 | End: 2018-11-02 | Stop reason: HOSPADM

## 2018-10-31 RX ORDER — NICOTINE POLACRILEX 4 MG
15-30 LOZENGE BUCCAL
Status: DISCONTINUED | OUTPATIENT
Start: 2018-10-31 | End: 2018-11-02 | Stop reason: HOSPADM

## 2018-10-31 RX ADMIN — APIXABAN 5 MG: 5 TABLET, FILM COATED ORAL at 20:05

## 2018-10-31 RX ADMIN — DEXTROSE MONOHYDRATE 25 G: 25 INJECTION, SOLUTION INTRAVENOUS at 16:01

## 2018-10-31 RX ADMIN — PRAVASTATIN SODIUM 20 MG: 20 TABLET ORAL at 20:05

## 2018-10-31 RX ADMIN — INSULIN HUMAN 7 UNITS: 100 INJECTION, SOLUTION PARENTERAL at 16:05

## 2018-10-31 RX ADMIN — ALLOPURINOL 100 MG: 100 TABLET ORAL at 20:05

## 2018-10-31 RX ADMIN — SODIUM BICARBONATE 50 MEQ: 84 INJECTION, SOLUTION INTRAVENOUS at 16:40

## 2018-10-31 RX ADMIN — OMEPRAZOLE 20 MG: 20 CAPSULE, DELAYED RELEASE ORAL at 20:04

## 2018-10-31 RX ADMIN — TRAMADOL HYDROCHLORIDE 50 MG: 50 TABLET, COATED ORAL at 16:50

## 2018-10-31 RX ADMIN — DEXTROSE AND SODIUM CHLORIDE: 5; 900 INJECTION, SOLUTION INTRAVENOUS at 15:57

## 2018-10-31 RX ADMIN — SODIUM POLYSTYRENE SULFONATE 15 G: 15 SUSPENSION ORAL; RECTAL at 16:07

## 2018-10-31 ASSESSMENT — PAIN DESCRIPTION - DESCRIPTORS: DESCRIPTORS: ACHING

## 2018-10-31 NOTE — H&P
[facility]    History and Physical  Hospitalist       Date of Admission:  10/31/2018  Date of Service (when I saw the patient): 10/31/18    Assessment & Plan   Katya Alcala is a 78 year old female with h/o CAD s/p CABG, chronic afib on Eliquis, s/p aortic valve replacement (bioprosthetic), s/p thoracic aortic aneurysm repair (all of which in 2008), HTN, HLD who presents from clinic with K+ of 6.5 and ALICIA.    ALICIA: possibly multifactorial, including dehydration/hypovolemia +/- bactrim side effect.  The patient was treated with ~1 week of bactrim for UTI, last dose yesterday.  - IVFs  - monitor UOP and renal function    Hyperkalemia: K+ was 6.5 in clinic today, possibly 2/2 renal failure.  EKG on admission did show diffuse hyperacute T waves.  - IVFs  - telemetry monitoring  - calcium, bicarbonate, dextrose, and insulin infusions  - will give her kayexalate  - frequent K monitoring    CVS: the patient has history of coronary artery disease status post CABG, aortic valve replacement with bioprosthetic, thoracic aortic aneurysm repair with tube graft all in 2008.  Patient also has history of hypertension as well as chronic atrial fibrillation on Eliquis.  -We will hold lisinopril for ALICIA and hyperkalemia  -Continue Norvasc, nadolol  -Continue Eliquis    Hyperlipidemia: Continue outpatient pravastatin    GERD: Continue outpatient omeprazole    UTI: treated with bactrim for 1 week PTA.  Repeat UA from [facility] shows infection resolution.  - no further abx     FEN: IV fluids, oral diet as tolerated.  We will check magnesium level    DVT Prophylaxis: Eliquis    Code Status: Full Code    Disposition: Expected discharge in 1-2 days once improving.    Emanuel Albright MD    Primary Care Physician   Lokesh Taylor    Chief Complaint   Hyperkalemia, dizziness, ALICIA    History is obtained from the patient    History of Present Illness   Katya Alcala is a 78 year old female who presents to the [facility]  [place] with dizziness.  She was seen by [facility] last week and diagnosed with a UTI, and she was sent home with a course of bactrim.  Her last dose was yesterday.  She was complaining of persistent dizziness, fatigue, and some anorexia.  Repeat UA showed improvement and clearing of her UTI, however her K+ was found to be 6.5 and she has ALICIA with Cr 2.18.  She was admitted for management of these abnormalities.  Her only symptom currently is dizziness.  She denies any cp or sob.  She states that she is hungry currently, denies any pain.  She states that she suffered a fall and a concussion last month, and she is wondering if that is what is causing her dizziness.  She states that she got bruised up from that, but those have healed well.      Past Medical History    I have reviewed this patient's medical history and updated it with pertinent information if needed.   Past Medical History:   Diagnosis Date     Atrial fibrillation, chronic (H) 6/12/2017     Coronary artery disease with stable angina pectoris (H) 2/7/2014    S/p 2 vessel bypass     Essential hypertension 8/25/2006    Overview:  IMO Update     Fibromyalgia 10/20/2005    Overview:      Gout 5/13/2010    Overview:  Updated per 10/1/17 IMO import     Hyperlipidemia 8/25/2006    Overview:  IMO Update 10/11     Hypertension 5/10/2004     Impaired fasting glucose 12/18/2008     Iron deficiency anemia due to chronic blood loss 1/20/2018     Long term (current) use of opiate analgesic 11/21/2017     S/P aortic valve replacement 2/7/2014    Overview:  IMO Update 10/11 Problem list name updated by automated process. Provider to review     S/P thoracic aortic aneurysm repair 4/22/2009    Overview:  IMO Update 10/11     Status post coronary artery bypass graft 4/22/2009    Overview:  IMO Update 10/11     Tubular adenoma of colon 3/9/2014       Past Surgical History   I have reviewed this patient's surgical history and updated it with pertinent information if  needed.  Past Surgical History:   Procedure Laterality Date     AORTIC VALVE REPLACEMENT  05/2008    Mitroflow [employer] bioprosthesis     APPENDECTOMY       C OB/GYN PROCEDURE                          DATE:  1986    Vaginal granulation tissue removed     CARDIAC CATHERIZATION  01/04/2016     COLONOSCOPY      and polypectomy     HISTORY OF CABG  05/2008    Single vessel     HISTORY OF CATARACT REPAIR Right 05/09/2011     HISTORY OF THORACIC AORTIC ANEURYSM REPAIR  05/2008    Ascending aorta, tube graft     SIOBHAN/BSO  1984    Benign fibroid     TONSILLECTOMY & ADENOIDECTOMY  1949       Prior to Admission Medications   Prior to Admission Medications   Prescriptions Last Dose Informant Patient Reported? Taking?   Omega-3 Fatty Acids (OMEGA-3 CF) 1000 MG CAPS  Self Yes No   Sig: Take 2,000 mg by mouth daily   allopurinol (ZYLOPRIM) 100 MG tablet  Self Yes No   Sig: Take 100 mg by mouth daily   amLODIPine (NORVASC) 10 MG tablet  Self Yes No   Sig: Take 10 mg by mouth daily   apixaban ANTICOAGULANT (ELIQUIS) 5 MG tablet  Self Yes No   Sig: Take 5 mg by mouth 2 times daily   isosorbide mononitrate (IMDUR) 30 MG 24 hr tablet  Self Yes No   Sig: Take 30 mg by mouth daily   lisinopril (PRINIVIL/ZESTRIL) 20 MG tablet  Self Yes No   Sig: Take 20 mg by mouth daily   nadolol (CORGARD) 80 MG tablet  Self Yes No   Sig: Take 80 mg by mouth daily   nitroGLYcerin (NITROSTAT) 0.4 MG sublingual tablet  Self Yes No   Sig: Do not crush. Dissolve 1 tablet under the tongue at 1st sign of angina. Repeat in 5 minutes till relief. Max of 3 tabs/15 minutes.   omeprazole (PRILOSEC) 20 MG CR capsule  Self Yes No   Sig: Take 20 mg by mouth daily   pravastatin (PRAVACHOL) 20 MG tablet  Self Yes No   Sig: Take 20 mg by mouth daily   traMADol (ULTRAM) 50 MG tablet  Self Yes No   Sig: Take 50 mg by mouth every 6 hours as needed for pain      Facility-Administered Medications: None     Allergies   Allergies   Allergen Reactions     Atorvastatin Other  (See Comments)     muscle aches     Codeine GI Disturbance     Causes GI upset (scanned record).     Crestor [Rosuvastatin] Muscle Pain (Myalgia)     Doxycycline      nausea     Meticorten Other (See Comments)     Can't sleep     Penicillins      Distant history of a rash  Injection site reaction     Prednisone Other (See Comments)     insomnia     Simvastatin Muscle Pain (Myalgia)       Social History   I have reviewed this patient's social history and updated it with pertinent information if needed. Katya Alcala  reports that she has never smoked. She has never used smokeless tobacco. She reports that she does not drink alcohol or use illicit drugs.    Family History   I have reviewed this patient's family history and updated it with pertinent information if needed.   Family History   Problem Relation Age of Onset     Diabetes Mother      Myocardial Infarction Father      50s     Kidney Cancer Brother      Diabetes Son        Review of Systems   The 10 point Review of Systems is negative other than noted in the HPI or here.    Physical Exam   Temp: 96.9  F (36.1  C)   BP: 117/57 Pulse: 92     SpO2: 97 %      Vital Signs with Ranges  Temp:  [96.9  F (36.1  C)] 96.9  F (36.1  C)  Pulse:  [92] 92  BP: (117)/(57) 117/57  SpO2:  [97 %] 97 %  0 lbs 0 oz    Constitutional: AA, NAD  Eyes: PERRLA, no injection, no icterus  HEENT: atraumatic, normocephalic  Respiratory: CTA b/l  Cardiovascular: S1 S2 irregular  GI: soft, NT, ND, + bowel sounds  Lymph/Hematologic: no palpable lymphadenopathy  Skin: no rashes, no lesions  Musculoskeletal: No edema, good tone, no deformities  Neurologic: oriented x 3, no focal deficits  Psychiatric: appropriate affect      Data   Data reviewed today:  I personally reviewed no images today  EKG reviewed, no peaked T waves    Outside labs reviewed from [facility].    K 6.5    Cr 2.18    No lab results found in last 7 days.       No results found for this or any previous visit (from the  past 24 hour(s)).

## 2018-10-31 NOTE — PROVIDER NOTIFICATION
DATE:  10/31/2018   TIME OF RECEIPT FROM LAB:  7623  LAB TEST:  K+  LAB VALUE:  6.7  RESULTS GIVEN WITH READ-BACK TO (PROVIDER):  Dr. Manuel  TIME LAB VALUE REPORTED TO PROVIDER:   9019

## 2018-10-31 NOTE — PROGRESS NOTES
Face to face report given with opportunity to observe patient.    Report given to Ritu Xavier   10/31/2018  3:02 PM

## 2018-10-31 NOTE — IP AVS SNAPSHOT
[facility]    [address] [address]    Phone:  [phone]    Fax:  [phone]                                       After Visit Summary   10/31/2018    Katya Alcala    MRN: [id number]           After Visit Summary Signature Page     I have received my discharge instructions, and my questions have been answered. I have discussed any challenges I see with this plan with the nurse or doctor.    ..........................................................................................................................................  Patient/Patient Representative Signature      ..........................................................................................................................................  Patient Representative Print Name and Relationship to Patient    ..................................................               ................................................  Date                                   Time    ..........................................................................................................................................  Reviewed by Signature/Title    ...................................................              ..............................................  Date                                               Time          22EPIC Rev 08/18

## 2018-10-31 NOTE — IP AVS SNAPSHOT
MRN:[id number]                      After Visit Summary   10/31/2018    Katya Alcala    MRN: [id number]           Thank you!     Thank you for choosing [facility] for your care. Our goal is always to provide you with excellent care. Hearing back from our patients is one way we can continue to improve our services. Please take a few minutes to complete the written survey that you may receive in the mail after you visit with us. Thank you!        Patient Information     Date Of Birth          1940        Designated Caregiver       Most Recent Value    Caregiver    Will someone help with your care after discharge? yes    Name of designated caregiver Lisa    Phone number of caregiver [phone]    Caregiver address [facility]      About your hospital stay     You were admitted on:  October 31, 2018 You last received care in the:  [facility]    You were discharged on:  November 2, 2018        Reason for your hospital stay       Dizziness, renal failure                  Who to Call     For medical emergencies, please call 911.  For non-urgent questions about your medical care, please call your primary care provider or clinic, [phone]          Attending Provider     Provider Specialty    Emanuel Albright MD Internal Medicine       Primary Care Provider Office Phone # Fax #    Lokesh Taylor [phone]      After Care Instructions     Activity       Your activity upon discharge: activity as tolerated            Diet       Follow this diet upon discharge: Orders Placed This Encounter      Regular Diet Adult                  Follow-up Appointments     Follow-up and recommended labs and tests        Follow up with primary care provider, Lokesh Taylor, within 7 days to evaluate medication change and for hospital follow- up.  The following labs/tests are recommended: bmp.                  Your next 10 appointments already scheduled     Nov 05, 2018 10:30 AM CST   (Arrive by  "10:15 AM)   MA SCREENING BILATERAL W/ ZAYNAB with HCMA1   [facility] ([facility] )    [address] [place] [address]   [phone]           How do I prepare for my exam? (Food and drink instructions) No Food and Drink Restrictions.  How do I prepare for my exam? (Other instructions) Do not use any powder, lotion or deodorant under your arms or on your breast. If you do, we will ask you to remove it before your exam.  What should I wear: Wear comfortable, two-piece clothing.  How long does the exam take: Most scans will take 15 minutes.  What should I bring: Bring any previous mammograms from other facilities or have them mailed to the breast center.  Do I need a :  No  is needed.  What do I need to tell my doctor: If you have any allergies, tell your care team.  What should I do after the exam: No restrictions, You may resume normal activities.  What is this test: This test is an x-ray of the breast to look for breast disease. The breast is pressed between two plates to flatten and spread the tissue. An X-ray is taken of the breast from different angles.  Who should I call with questions: If you have any questions, please call the Imaging Department where you will have your exam. Directions, parking instructions, and other information is available on our website, [email or web address].  Other information about my exam Three-dimensional (3D) mammograms are available at [place] locations in [facility], [facility], [facility], [place], and [place]. [facility] locations include [place] and the Clinics and Surgery Center in [place].  Benefits of 3D mammograms include: * Improved rate of cancer detection * Decreases your chance of having to go back for more tests, which means fewer: * \"False-positive\" results (This means that there is an abnormal area but it isn't cancer.) * Invasive testing procedures, such as a biopsy or surgery * " "Can provide clearer images of the breast if you have dense breast tissue.  *3D mammography is an optional exam that anyone can have with a 2D mammogram. It doesn't replace or take the place of a 2D mammogram. 2D mammograms remain an effective screening test for all women.  Not all insurance companies cover the cost of a 3D mammogram. Check with your insurance.              Further instructions from your care team       You have a follow up appointment scheduled with Mckayla Varma on  at 8am. Please arrive at 745 to register.       Pending Results     No orders found from 10/29/2018 to 2018.            Statement of Approval     Ordered          18 1013  I have reviewed and agree with all the recommendations and orders detailed in this document.  EFFECTIVE NOW     Approved and electronically signed by:  Emanuel Albright MD             Admission Information     Date & Time Provider Department Dept. Phone    10/31/2018 Emanuel Albright MD [facility] [phone]      Your Vitals Were     Blood Pressure Pulse Temperature Respirations Height Pulse Oximetry    154/70 92 99.8  F (37.7  C) (Temporal) 18 1.549 m (5' 1\") 96%    BMI (Body Mass Index)                   27.2 kg/m2           MyChart Information     [employer] lets you send messages to your doctor, view your test results, renew your prescriptions, schedule appointments and more. To sign up, go to [email or web address] . Click on \"Log in\" on the left side of the screen, which will take you to the Welcome page. Then click on \"Sign up Now\" on the right side of the page.     You will be asked to enter the access code listed below, as well as some personal information. Please follow the directions to create your username and password.     Your access code is: [email or web address]  Expires: 2018  3:14 PM     Your access code will  in 90 days. If you need help or a new code, please call your [facility] clinic or [phone].      "   Care EveryWhere ID     This is your Care EveryWhere ID. This could be used by other organizations to access your [facility] medical records  [id number]        Equal Access to Services     KESHAV SUN : Hadii aad ku hadpritiella Farr, elbertkrys loydsantanaha, guerline kaaram holbrook, yolanda mimain hayaaconcha zendejastriston luong laMirthaveto tomas. So [facility] [phone].    ATENCIÓN: Si habla español, tiene a pastrana disposición servicios gratuitos de asistencia lingüística. Llame al [phone].    We comply with applicable federal civil rights laws and [place] laws. We do not discriminate on the basis of race, color, national origin, age, disability, sex, sexual orientation, or gender identity.               Review of your medicines      CONTINUE these medicines which have NOT CHANGED        Dose / Directions    allopurinol 100 MG tablet   Commonly known as:  ZYLOPRIM        Dose:  100 mg   Take 100 mg by mouth daily   Refills:  0       amLODIPine 10 MG tablet   Commonly known as:  NORVASC        Dose:  10 mg   Take 10 mg by mouth daily   Refills:  0       ELIQUIS 5 MG tablet   Generic drug:  apixaban ANTICOAGULANT        Dose:  5 mg   Take 5 mg by mouth 2 times daily   Refills:  0       isosorbide mononitrate 30 MG 24 hr tablet   Commonly known as:  IMDUR        Dose:  30 mg   Take 30 mg by mouth daily   Refills:  0       Magnesium 400 MG Tabs        Dose:  400 mg   Take 400 mg by mouth daily   Refills:  0       nadolol 80 MG tablet   Commonly known as:  CORGARD        Dose:  80 mg   Take 80 mg by mouth daily   Refills:  0       nitroGLYcerin 0.4 MG sublingual tablet   Commonly known as:  NITROSTAT        Do not crush. Dissolve 1 tablet under the tongue at 1st sign of angina. Repeat in 5 minutes till relief. Max of 3 tabs/15 minutes.   Refills:  0       OMEGA-3 CF 1000 MG Caps        Dose:  1000 mg   Take 1,000 mg by mouth 2 times daily   Refills:  0       omeprazole 20 MG CR capsule   Commonly known as:  priLOSEC        Dose:  20 mg   Take 20  mg by mouth daily   Refills:  0       pravastatin 20 MG tablet   Commonly known as:  PRAVACHOL        Dose:  20 mg   Take 20 mg by mouth daily   Refills:  0       traMADol 50 MG tablet   Commonly known as:  ULTRAM        Dose:  50 mg   Take 50 mg by mouth every 6 hours as needed for pain   Refills:  0         STOP taking     lisinopril 20 MG tablet   Commonly known as:  PRINIVIL/ZESTRIL                    Protect others around you: Learn how to safely use, store and throw away your medicines at www.disposemymeds.org.             Medication List: This is a list of all your medications and when to take them. Check marks below indicate your daily home schedule. Keep this list as a reference.      Medications           Morning Afternoon Evening Bedtime As Needed    allopurinol 100 MG tablet   Commonly known as:  ZYLOPRIM   Take 100 mg by mouth daily   Last time this was given:  100 mg on 11/1/2018  6:45 PM                                amLODIPine 10 MG tablet   Commonly known as:  NORVASC   Take 10 mg by mouth daily   Last time this was given:  10 mg on 11/1/2018  6:45 PM                                ELIQUIS 5 MG tablet   Take 5 mg by mouth 2 times daily   Last time this was given:  5 mg on 11/2/2018  8:29 AM   Generic drug:  apixaban ANTICOAGULANT                                isosorbide mononitrate 30 MG 24 hr tablet   Commonly known as:  IMDUR   Take 30 mg by mouth daily   Last time this was given:  30 mg on 11/1/2018  6:45 PM                                Magnesium 400 MG Tabs   Take 400 mg by mouth daily                                nadolol 80 MG tablet   Commonly known as:  CORGARD   Take 80 mg by mouth daily   Last time this was given:  80 mg on 11/1/2018  6:45 PM                                nitroGLYcerin 0.4 MG sublingual tablet   Commonly known as:  NITROSTAT   Do not crush. Dissolve 1 tablet under the tongue at 1st sign of angina. Repeat in 5 minutes till relief. Max of 3 tabs/15 minutes.              "                   OMEGA-3 CF 1000 MG Caps   Take 1,000 mg by mouth 2 times daily                                omeprazole 20 MG CR capsule   Commonly known as:  priLOSEC   Take 20 mg by mouth daily   Last time this was given:  20 mg on 11/1/2018  6:45 PM                                pravastatin 20 MG tablet   Commonly known as:  PRAVACHOL   Take 20 mg by mouth daily   Last time this was given:  20 mg on 11/1/2018  6:45 PM                                traMADol 50 MG tablet   Commonly known as:  ULTRAM   Take 50 mg by mouth every 6 hours as needed for pain   Last time this was given:  50 mg on 11/1/2018  6:48 PM                                          More Information        Discharge Instructions for Hyperkalemia  You have been diagnosed with hyperkalemia. This means you have a high level of potassium in your blood. Potassium is important to the function of the nerve and muscle cells, including the cells of the heart. But a high level of potassium in the blood cause serious problems such as abnormal heart rhythms and even heart attack.  Diet changes  Eat less of these potassium-rich foods:    Bananas (do not eat bananas)    Apricots, fresh or dried    Oranges and orange juice    Grapefruit juice    Tomatoes, tomato sauce, and tomato juice    Spinach    Green, leafy vegetables, including salad greens, kale, broccoli, chard, and collards    Melons of all kinds    Peas    Beans    Potatoes    Sweet potatoes    Avocados and guacamole    Vegetable juice (homemade or store-bought) and vegetable juice cocktail    Fruit juices    Nuts, including pistachios, almonds, peanuts, hazelnuts, [place], cashew, mixed    \"Lite\" or reduced sodium salt  Other home care    Tell your healthcare provider about all prescription and over-the-counter medicines you are taking. Certain medicines can increase potassium levels.    Take all medicines exactly as directed.    Have your potassium levels checked regularly.    Keep all follow-up " appointments. Your healthcare provider needs to monitor your condition closely.    Learn to take your own pulse. If your pulse is less than 60 beats per minute or irregular, call your provider.  Follow-up  Follow up with your healthcare provider, or as advised.  When to call your healthcare provider  Call your healthcare provider right away if you have any of the following:    Chest pain (call 911)    Fainting (call 911)    Shortness of breath (call 911 if severe)    Slow, irregular heartbeat    Fatigue    Dizziness    Lightheadedness    Confusion    Weakness   Date Last Reviewed: 6/1/2017 2000-2017 [employer]. [address] [place] [address]. All rights reserved. This information is not intended as a substitute for professional medical care. Always follow your healthcare professional's instructions.                Hyperkalemia  Hyperkalemia is too much potassium in the blood. This most often occurs in people who take certain medicines or people with kidney disease.  This condition often has no symptoms until levels of potassium become high. If symptoms do occur, they include muscle weakness and changes in the heartbeat. A blood test is done to diagnose the problem. An ECG (electrocardiogram) may also be done to test the heartbeat.  If hyperkalemia is caused by a medicine, the healthcare provider may lower the dose or switch to a different medicine. A low-potassium diet may also be prescribed.   Home care    Be sure your healthcare provider knows about all of the medicines you take. Follow your healthcare provider's advice about making changes to your medicines.    If a low-potassium diet has been prescribed, follow this closely. If you need help, ask to be referred to a dietitian for advice on how to follow this diet.  Follow-up care  Follow up with your healthcare provider. You may need a repeat blood test within the next 7 days. Schedule this as advised.  When to seek medical  advice  Call your healthcare provider right away if any of the following occur:    Weakness, dizziness, or lightheadedness    Nausea, vomiting, or diarrhea    Urinating only in small amounts or not urinating    Symptoms don't go away or get worse  Call 911  Call 911 if any of the following occur:    Fast or irregular heartbeat    Fainting    Severe shortness of breath    Chest, arm, shoulder, neck or upper back pain    Trouble controlling your muscles  Date Last Reviewed: 5/1/2017 2000-2017 [employer]. [address] [place] [address]. All rights reserved. This information is not intended as a substitute for professional medical care. Always follow your healthcare professional's instructions.                Dehydration (Adult)  Dehydration occurs when your body loses too much fluid. This may be the result of prolonged vomiting or diarrhea, excessive sweating, or a high fever. It may also happen if you don t drink enough fluid when you re sick or out in the heat. Misuse of diuretics (water pills) can also be a cause.  Symptoms include thirst and decreased urine output. You may also feel dizzy, weak, fatigued, or very drowsy. The diet described below is usually enough to treat dehydration. In some cases, you may need medicine.  Home care    Drink at least 12 8-ounce glasses of fluid every day to resolve the dehydration. Fluid may include water; orange juice; lemonade; apple, grape, or cranberry juice; clear fruit drinks; electrolyte replacement and sports drinks; and teas and coffee without caffeine. Don't drink alcohol. If you have been diagnosed with a kidney disease, ask your doctor how much and what types of fluids you should drink to prevent dehydration. If you have kidney disease, fluid can build up in the body. This can be dangerous to your health.    If you have a fever, muscle aches, or a headache as a result of a cold or flu, you may take acetaminophen or ibuprofen, unless another  medicine was prescribed. If you have chronic liver or kidney disease, or have ever had a stomach ulcer or gastrointestinal bleeding, talk with your healthcare provider before using these medicines. Don't take aspirin if you are younger than 18 and have a fever. Aspirin raises the chance for severe liver injury.  Follow-up care  Follow up with your healthcare provider, or as advised.  When to seek medical advice  Call your healthcare provider right away if any of these occur:    Continued vomiting    Frequent diarrhea (more than 5 times a day); blood (red or black color) or mucus in diarrhea    Blood in vomit or stool    Swollen abdomen or increasing abdominal pain    Weakness, dizziness, or fainting    Unusual drowsiness or confusion    Reduced urine output or extreme thirst    Fever of 100.4 F (38 C) or higher  Date Last Reviewed: 5/1/2017 2000-2017 [employer]. [address] [place], [place] [id number]. All rights reserved. This information is not intended as a substitute for professional medical care. Always follow your healthcare professional's instructions.                Caring for Yourself When You Have Kidney Failure  Kidney failure and its treatment will mean changes in your daily life. Whatever changes you need to make, your healthcare team can help you with them.     In most cases, you will be able to continue working.   Your daily life  You may wonder how your treatment will fit into the rest of your life. But, with some changes, you can live a full life with kidney failure. If you work, talk to your employer about any changes you need to make in your duties or schedule. You may find that your energy levels go up and down and that you notice new physical problems. If you aren't able to do your daily activities as before, your healthcare provider may suggest treatments or send you to physical therapy.   Food, drink, and medicines    No matter which treatment you choose, you ll have some  limits on what you eat and drink. A dietitian will help you learn these. Specifically, you may need to avoid foods that are high in salt, potassium, or phosphorus.     Treatment means taking medicines. Some of these you need to take one or more times a day. Others are given to you during treatment or healthcare provider visits. Have a list of the medicines you take. Show it to any healthcare provider you visit. Also check with your healthcare provider or pharmacist before taking any medicine, including aspirin, that is not on the list. Many medicines are eliminated or processed by kidneys. Your dosage may have to be adjusted by your healthcare provider or pharmacist. Other medicines, such as the IV dye injected during some body scans, may harm your kidneys, and you may not be allowed to take them.   Making healthy choices  You can make choices about your lifestyle that will help your treatment work better. Exercise may reduce your treatment s side effects, and can help you control your weight and blood pressure. Ask your healthcare team which types of exercise are good for you. If you smoke, it s important that you quit. Smoking constricts blood vessels and causes infections, which are both dangerous to people with kidney failure. Talk to your healthcare team about quitting. If you have diabetes or high blood pressure, it's important to control your sugar levels and blood pressure as directed by your healthcare provider. Keep your weight in a normal range for your body, and keep your cholesterol levels controlled, as well.   Looking after your health  With the right treatment, you should begin to feel better. If you follow all the guidelines you are given and still don t feel well, tell your healthcare provider. Some changes may need to be made in your treatment. You will need regular checkups with your healthcare provider.  Things to know  [place] the statements below that are true for you. For each statement you  don t [place], ask a healthcare provider to help you learn what you need to know:    I have a list of all the medicines that I take.     I know whom to talk to when I need extra help or support.     I know which foods I should eat. I also know how much I should eat.    I have talked to a healthcare provider about exercise.    I have names and numbers for all my healthcare providers.    I know what my insurance covers and what it doesn't.   Date Last Reviewed: 1/1/2017 2000-2017 [employer]. [address] [place] [address], [place] [address]. All rights reserved. This information is not intended as a substitute for professional medical care. Always follow your healthcare professional's instructions.

## 2018-10-31 NOTE — PROGRESS NOTES
[facility] Inpatient Admission Note:    Patient admitted to 3230/3230-1 at approximately 1400 via wheel chair accompanied by transport tech from clinic . Report received from nobody in SBAR format at n/a via n/a. Patient ambulated to bed via self.. Patient is alert and oriented X 3, reports pain; rates at 4 on 0-10 scale.  Patient oriented to room, unit, hourly rounding, and plan of care. Explained admission packet and patient handbook with patient bill of rights brochure. Will continue to monitor and document as needed.     Inpatient Nursing criteria listed below was met:    Health care directives status obtained and documented: No    Care Everywhere authorization obtained No    MRSA swab completed for patient 65 years and older: Yes    Patient identifies a surrogate decision maker: No If yes, who:n/a Contact Information:n/a    Core Measure diagnosis present:No. If yes, state diagnosis: n/a     If initial lactic acid >2.0, repeat lactic acid drawn within one hour of arrival to unit: No. If no, state reason: n/a    Vaccination assessment and education completed: Yes   Vaccinations received prior to admission: Pneumovax n/a  Influenza(seasonal)  YES   Vaccination(s) ordered: n/a    Clergy visit ordered if patient requests: N/A    Skin issues/needs documented: N/A    Isolation Patient: MRSA sent Education given, correct sign in place and documentation row added to PCS:  No    Fall Prevention No: Care plan updated, education given and documented, sticker and magnet in place: N/A    Care Plan initiated: Yes    Education Documented (including assessment): Yes    Patient has discharge needs : No If yes, please explain:n/a

## 2018-11-01 LAB
ANION GAP SERPL CALCULATED.3IONS-SCNC: 3 MMOL/L (ref 3–14)
BACTERIA SPEC CULT: NORMAL
BUN SERPL-MCNC: 29 MG/DL (ref 7–30)
CALCIUM SERPL-MCNC: 8.6 MG/DL (ref 8.5–10.1)
CHLORIDE SERPL-SCNC: 114 MMOL/L (ref 94–109)
CO2 SERPL-SCNC: 23 MMOL/L (ref 20–32)
CREAT SERPL-MCNC: 1.65 MG/DL (ref 0.52–1.04)
GFR SERPL CREATININE-BSD FRML MDRD: 30 ML/MIN/1.7M2
GLUCOSE SERPL-MCNC: 119 MG/DL (ref 70–99)
POTASSIUM SERPL-SCNC: 4.4 MMOL/L (ref 3.4–5.3)
POTASSIUM SERPL-SCNC: 5.3 MMOL/L (ref 3.4–5.3)
POTASSIUM SERPL-SCNC: 5.4 MMOL/L (ref 3.4–5.3)
SODIUM SERPL-SCNC: 140 MMOL/L (ref 133–144)
SPECIMEN SOURCE: NORMAL

## 2018-11-01 PROCEDURE — 12000000 ZZH R&B MED SURG/OB

## 2018-11-01 PROCEDURE — 84132 ASSAY OF SERUM POTASSIUM: CPT | Performed by: INTERNAL MEDICINE

## 2018-11-01 PROCEDURE — 80048 BASIC METABOLIC PNL TOTAL CA: CPT | Performed by: INTERNAL MEDICINE

## 2018-11-01 PROCEDURE — 99232 SBSQ HOSP IP/OBS MODERATE 35: CPT | Performed by: INTERNAL MEDICINE

## 2018-11-01 PROCEDURE — 36415 COLL VENOUS BLD VENIPUNCTURE: CPT | Performed by: INTERNAL MEDICINE

## 2018-11-01 PROCEDURE — A9270 NON-COVERED ITEM OR SERVICE: HCPCS | Mod: GY | Performed by: INTERNAL MEDICINE

## 2018-11-01 PROCEDURE — 25000128 H RX IP 250 OP 636: Performed by: INTERNAL MEDICINE

## 2018-11-01 PROCEDURE — 25000132 ZZH RX MED GY IP 250 OP 250 PS 637: Mod: GY | Performed by: INTERNAL MEDICINE

## 2018-11-01 PROCEDURE — G0378 HOSPITAL OBSERVATION PER HR: HCPCS

## 2018-11-01 RX ORDER — SODIUM POLYSTYRENE SULFONATE 15 G/60ML
15 SUSPENSION ORAL; RECTAL ONCE
Status: COMPLETED | OUTPATIENT
Start: 2018-11-01 | End: 2018-11-01

## 2018-11-01 RX ADMIN — OMEPRAZOLE 20 MG: 20 CAPSULE, DELAYED RELEASE ORAL at 18:45

## 2018-11-01 RX ADMIN — ISOSORBIDE MONONITRATE 30 MG: 30 TABLET, EXTENDED RELEASE ORAL at 18:45

## 2018-11-01 RX ADMIN — ACETAMINOPHEN 650 MG: 325 TABLET, FILM COATED ORAL at 06:25

## 2018-11-01 RX ADMIN — SODIUM POLYSTYRENE SULFONATE 15 G: 15 SUSPENSION ORAL; RECTAL at 16:08

## 2018-11-01 RX ADMIN — TRAMADOL HYDROCHLORIDE 50 MG: 50 TABLET, COATED ORAL at 18:48

## 2018-11-01 RX ADMIN — DEXTROSE AND SODIUM CHLORIDE 1000 ML: 5; 900 INJECTION, SOLUTION INTRAVENOUS at 11:25

## 2018-11-01 RX ADMIN — APIXABAN 5 MG: 5 TABLET, FILM COATED ORAL at 09:10

## 2018-11-01 RX ADMIN — AMLODIPINE BESYLATE 10 MG: 10 TABLET ORAL at 18:45

## 2018-11-01 RX ADMIN — SODIUM POLYSTYRENE SULFONATE 15 G: 15 SUSPENSION ORAL; RECTAL at 09:10

## 2018-11-01 RX ADMIN — NADOLOL 80 MG: 40 TABLET ORAL at 18:45

## 2018-11-01 RX ADMIN — APIXABAN 5 MG: 5 TABLET, FILM COATED ORAL at 18:45

## 2018-11-01 RX ADMIN — PRAVASTATIN SODIUM 20 MG: 20 TABLET ORAL at 18:45

## 2018-11-01 RX ADMIN — ALLOPURINOL 100 MG: 100 TABLET ORAL at 18:45

## 2018-11-01 RX ADMIN — DEXTROSE AND SODIUM CHLORIDE: 5; 900 INJECTION, SOLUTION INTRAVENOUS at 01:52

## 2018-11-01 ASSESSMENT — ACTIVITIES OF DAILY LIVING (ADL)
ADLS_ACUITY_SCORE: 13

## 2018-11-01 NOTE — PLAN OF CARE
Pt awake, alert and oriented, IV infusing, pt ordered breakfast and ate.     Face to face report given with opportunity to observe patient.    Report given to EDD Chaney   11/1/2018  9:00 AM

## 2018-11-01 NOTE — PLAN OF CARE
Problem: Patient Care Overview  Goal: Plan of Care/Patient Progress Review  -diagnostic tests and consults completed and resulted  -vital signs normal or at patient baseline  Nurse to notify provider when observation goals have been met and patient is ready for discharge.  Outcome: No Change  Patient has c/o chronic fibromyalgia pain this shift, she's rating pain 2-5/10. Ultram PO has been given once this shift, she has declined offers for other pain medications. VSS, afebrile. Telemetry A-Fib 70-90's with BBB and increased T wave per ICU written report. Patient's home blood pressure medications held because second BP was 102/51. D50, Humulin insulin 7 units and Sodium Bicarbonate IV was given along with Kayexalate at the beginning of the shift. Potassium redraws at 1800 and 2200 were both 5.2. Accu checks as charted. Oral intake adequate. Patient has been encouraged to increase her oral fluid intake because she was dehydrated. Measured urine output 100 ml, patient had voided twice before hat was placed and per unit assistant, patient had removed hat to void again. Patient encouraged to leave hat in place so accurate I&O is measured. She was incontinent of stool after Kayexalate was given and had additional loose stool mid shift. Patient reported being constipated prior to admission. Remains stand by assist for safety. Alarms in place. Call light within reach. IVF continues.    Face to face report given with opportunity to observe patient.    Report given to Mckayla PUGA.    Ritu Groves   10/31/2018  11:27 PM

## 2018-11-01 NOTE — PROGRESS NOTES
[facility]    Spiritual Health Progress Note    Date of Service (when I saw the patient): 11/01/2018     Assessment & Plan   Katya Alcala is a 78 year old female who was admitted on 10/31/2018.  Introduced the patient as an initial visit to Spiritual Health Services and to see if I could connect the patient to her identified tiny community. Had a brief discussion.  Patient had connections with her [facility] in [place], [place].      Rev. Aaron Terry  Volunteer

## 2018-11-01 NOTE — PROGRESS NOTES
Assessment completed see flowsheet.    LOC: alert , oriented, conversational  Others present: Patient     Dx: ALICIA, Hyperkalemia    Lives with: Alone   Living at:  Home  Transportation: YES    She does drive and has a reliable vehicle .    Support System:  States her whole family supports her. Many live in [place]. She has 4 sons in [place] and their families.  Homecare/PCA: No  /County Services:   No  : NO      VA Referral line called: NA    Primary PCP: Lokesh Taylro  Health Care Directive: NO States she does not want any information    Ph[facility]acy: [facility]  Meds management: YES  No concerns with her medications. States she makes her own appointments.     Adequate Resources for needs (housing, utilities, food/med): YES States she does get help with paying her Part B Medicare. No problems affording food, meds , utilities and necessities.  Household chores: Independent. Family would help her if she requested any assistance.   Work/community/social activity: YES Loves spending time with her grandchildren and great grandchildren, watches TV, Spending time with her friends    ADLs: Independent  Ambulation:Independent  Falls: Recent fall in Sept when she was walking in a parking lot. Not sure if she just tripped. Did have a concussion and was admitted to [facility]. She did not lose consciousness. She used a walker for about 3 days.   Nutrition: good.  Eats a general diet. Some days eats more than other days.   Sleep: Pretty good.  Maybe up once to use bathroom.   Equipment used: None    Mental health: Denies any  depression or anxiety  Substance abuse: Non smoker. No alcohol or illict drug use.   Exposure to violence/abuse: Denies  Stressors: Current medical condition    Able to Return to Prior Living Arrangements: YES    Plan: Return home. No needs or barriers indicated or assessed. Family to transport on discharge    Barriers: None    CARMELA: None    Medicare IM letter reviewed  with Katya on 11/1

## 2018-11-01 NOTE — PROVIDER NOTIFICATION
MD updated RE: /51, HR 77. Patient's home medications are due: Norvasc, Imdur and Corgard. Order received to hold these medications this evening. Updated that 1800 potassium down to 5.2 and redraw timed again for 2200.

## 2018-11-01 NOTE — PLAN OF CARE
Problem: Patient Care Overview  Goal: Plan of Care/Patient Progress Review  -diagnostic tests and consults completed and resulted  -vital signs normal or at patient baseline  Nurse to notify provider when observation goals have been met and patient is ready for discharge.   Outcome: No Change  Pt A&Ox3. She reported a headache this AM and received tylenol. Afebrile. HR 80s and irregular. /56 and 122/62. RR 20 and sating greater than 92% on room air. Lung sounds are clear. Bowel sounds are active. Up to bathroom with SBA. Voiding adequately. IVF continued. Potassium with morning lab 5.4. Alarms on, call light within reach and pt calls appropriately.     Face to face report given with opportunity to observe patient.  Report given to EDD Livingston.    Faviola Winslow  11/1/2018, 7:14 AM

## 2018-11-01 NOTE — PROGRESS NOTES
[facility]    Hospitalist Progress Note    Date of Service (when I saw the patient): 11/01/2018    Assessment & Plan   Katya Alcala is a 78 year old female with h/o CAD s/p CABG, chronic afib on Eliquis, s/p aortic valve replacement (bioprosthetic), s/p thoracic aortic aneurysm repair (all of which in 2008), HTN, HLD who presents from clinic with K+ of 6.5 and ALICIA.     ALICIA: possibly multifactorial, including dehydration/hypovolemia +/- bactrim side effect.  The patient was treated with ~1 week of bactrim for UTI.  Improving, but Cr still elevated at 1.65.  - IVFs  - monitor UOP and renal function     Hyperkalemia: K+ was 6.5 in clinic today, possibly 2/2 renal failure.  EKG on admission did show diffuse hyperacute T waves.  Improved, but K still persistently high this AM at 5.4.  - another dose of kayexalate  - IVFs  - telemetry monitoring  - frequent K monitoring     CVS: the patient has history of coronary artery disease status post CABG, aortic valve replacement with bioprosthetic, thoracic aortic aneurysm repair with tube graft all in 2008.  Patient also has history of hypertension as well as chronic atrial fibrillation on Eliquis.  -We will hold lisinopril for ALICIA and hyperkalemia  -Continue Norvasc, nadolol  -Continue Eliquis     Hyperlipidemia: Continue outpatient pravastatin     GERD: Continue outpatient omeprazole     UTI: treated with bactrim for 1 week PTA.  Repeat UA from [facility] shows infection resolution.  - no further abx      FEN: IV fluids, oral diet as tolerated.     DVT Prophylaxis: Eliquis     Code Status: Full Code     Disposition: Expected discharge in 1-2 days once improving.    Emanuel Albright MD      Interval History   Patient feels better, still somewhat dizzy with ambulation, otherwise doing well.  Did have loose stooling with kayexalate.    -Data reviewed today: I reviewed all new labs and imaging results over the last 24 hours. I personally reviewed no images or EKG's  today.    Physical Exam   Temp: 99.5  F (37.5  C) Temp src: Temporal BP: 120/64 Pulse: 92 Heart Rate: 88 Resp: 20 SpO2: 99 % O2 Device: None (Room air)    There were no vitals filed for this visit.  Vital Signs with Ranges  Temp:  [96.9  F (36.1  C)-99.5  F (37.5  C)] 99.5  F (37.5  C)  Pulse:  [92] 92  Heart Rate:  [77-92] 88  Resp:  [18-20] 20  BP: (102-131)/(51-64) 120/64  SpO2:  [97 %-99 %] 99 %    Intake/Output Summary (Last 24 hours) at 11/01/18 1101  Last data filed at 11/01/18 0900   Gross per 24 hour   Intake             2303 ml   Output              300 ml   Net             2003 ml       Peripheral IV 10/31/18 Right (Active)   Site Assessment WDL 11/1/2018  9:07 AM   Line Status Infusing;Checked every 1-2 hour 11/1/2018  9:07 AM   Phlebitis Scale 0-->no symptoms 11/1/2018  9:07 AM   Infiltration Scale 0 11/1/2018  9:07 AM   Number of days:1     Line/device assessment(s) completed for medical necessity    Constitutional: AA, NAD  Eyes: PERRLA, no injection, no icterus  HEENT: atraumatic, normocephalic  Respiratory: CTA b/l  Cardiovascular: S1 S2 irregular  GI: soft, NT, ND, + bowel sounds  Lymph/Hematologic: no palpable lymphadenopathy  Skin: no rashes, no lesions  Musculoskeletal: No edema, good tone, no deformities  Neurologic: oriented x 3, no focal deficits  Psychiatric: appropriate affect    Medications     dextrose 5% and 0.9% NaCl 100 mL/hr at 11/01/18 0152       allopurinol  100 mg Oral Daily     amLODIPine  10 mg Oral Daily     apixaban ANTICOAGULANT  5 mg Oral BID     isosorbide mononitrate  30 mg Oral Daily     nadolol  80 mg Oral Daily     omeprazole  20 mg Oral Daily     pravastatin  20 mg Oral Daily     sodium chloride (PF)  3 mL Intracatheter Q8H       Data     Recent Labs  Lab 11/01/18  0510 10/31/18  2200 10/31/18  1806 10/31/18  1403     --   --  137   POTASSIUM 5.4* 5.2 5.2 6.7*   CHLORIDE 114*  --   --  112*   CO2 23  --   --  21   BUN 29  --   --  35*   CR 1.65*  --   --  2.14*    ANIONGAP 3  --   --  4   EMMA 8.6  --   --  10.2*   *  --   --  101*          No results found for this or any previous visit (from the past 24 hour(s)).    Emanuel Albright MD

## 2018-11-02 VITALS
HEART RATE: 92 BPM | DIASTOLIC BLOOD PRESSURE: 70 MMHG | RESPIRATION RATE: 18 BRPM | HEIGHT: 61 IN | OXYGEN SATURATION: 96 % | BODY MASS INDEX: 27.2 KG/M2 | TEMPERATURE: 99.8 F | SYSTOLIC BLOOD PRESSURE: 154 MMHG

## 2018-11-02 LAB
ANION GAP SERPL CALCULATED.3IONS-SCNC: 3 MMOL/L (ref 3–14)
BUN SERPL-MCNC: 13 MG/DL (ref 7–30)
CALCIUM SERPL-MCNC: 8.4 MG/DL (ref 8.5–10.1)
CHLORIDE SERPL-SCNC: 114 MMOL/L (ref 94–109)
CO2 SERPL-SCNC: 26 MMOL/L (ref 20–32)
CREAT SERPL-MCNC: 1.03 MG/DL (ref 0.52–1.04)
GFR SERPL CREATININE-BSD FRML MDRD: 52 ML/MIN/1.7M2
GLUCOSE SERPL-MCNC: 104 MG/DL (ref 70–99)
POTASSIUM SERPL-SCNC: 4.7 MMOL/L (ref 3.4–5.3)
SODIUM SERPL-SCNC: 143 MMOL/L (ref 133–144)

## 2018-11-02 PROCEDURE — A9270 NON-COVERED ITEM OR SERVICE: HCPCS | Mod: GY | Performed by: INTERNAL MEDICINE

## 2018-11-02 PROCEDURE — 99239 HOSP IP/OBS DSCHRG MGMT >30: CPT | Performed by: INTERNAL MEDICINE

## 2018-11-02 PROCEDURE — 80048 BASIC METABOLIC PNL TOTAL CA: CPT | Performed by: INTERNAL MEDICINE

## 2018-11-02 PROCEDURE — 25000128 H RX IP 250 OP 636: Performed by: INTERNAL MEDICINE

## 2018-11-02 PROCEDURE — 36415 COLL VENOUS BLD VENIPUNCTURE: CPT | Performed by: INTERNAL MEDICINE

## 2018-11-02 PROCEDURE — 25000132 ZZH RX MED GY IP 250 OP 250 PS 637: Mod: GY | Performed by: INTERNAL MEDICINE

## 2018-11-02 RX ADMIN — APIXABAN 5 MG: 5 TABLET, FILM COATED ORAL at 08:29

## 2018-11-02 RX ADMIN — DEXTROSE AND SODIUM CHLORIDE: 5; 900 INJECTION, SOLUTION INTRAVENOUS at 08:10

## 2018-11-02 ASSESSMENT — ACTIVITIES OF DAILY LIVING (ADL)
ADLS_ACUITY_SCORE: 13

## 2018-11-02 NOTE — DISCHARGE INSTRUCTIONS
You have a follow up appointment scheduled with Mckayla Varma on Wednesday, November 14th at 8am. Please arrive at 745 to register.

## 2018-11-02 NOTE — PROGRESS NOTES
Name: Katya Alcala    MRN#: [id number]    Reason for Hospitalization: Hyperkalemia, ARF, Dehydration  Hyperkalemia  Hyperkalemia    Discharge Date: 11/2/2018    Patient / Family response to discharge plan: Agree    Follow-Up Appt: Future Appointments  Date Time Provider Department Center   11/5/2018 10:30 AM [facility]       Other Providers (Care Coordinator, County Services, PCA services etc): No    Discharge Disposition: home  Katya has her vehicle here and will drive herself home. Her cousin is here and will be a passenger in the vehicle on the drive home.     Ciera Estrada

## 2018-11-02 NOTE — PLAN OF CARE
Problem: Patient Care Overview  Goal: Plan of Care/Patient Progress Review  -diagnostic tests and consults completed and resulted  -vital signs normal or at patient baseline  Nurse to notify provider when observation goals have been met and patient is ready for discharge.   Outcome: Improving  Patient continues to c/o chronic fibromyalgia pain and intermittent low back pain. She declined offers from pain medication on day shift through until bedtime on evening shift, Ultram PO was given at that time. VSS, afebrile. Telemetry A-Fib 60-80 with BBB on day shift and evening shift per ICU written report. Home BP medications given after 1830. Kayexalate given on day shift didn't have results by afternoon shift so MD was updated and additional dose of Kayexalate was given. Patient has been up to the bathroom numerous times on afternoon shift to pass gas and have BM's. Potassium at 1800 down to 4.4. Oral intake fair, patient needed encouragement to increase oral fluid intake because she didn't do well on day shift. Patient continues to remove hat from her toilet if she thinks she's going to have a BM so accurate urine output is not known. Buttocks is pinkish but blanchable, she sat up in the chair for most of day shift and either laid in bed or sat at the edge of bed for afternoon shift. Encouraging her to shift her weight frequently. Patient ambulated hallways once on day shift, she was more reluctant to ambulate after she'd had Kayexalate due to being incontinent the day before. Remains stand by assist of for safety. She has continued to c/o intermittent dizziness upon standing. Alarms in place. Call light within reach. IVF continues.    Face to face report given with opportunity to observe patient.    Report given to Mckayla Groves   11/1/2018  11:23 PM

## 2018-11-02 NOTE — PLAN OF CARE
Patient ambulated, and was able to tolerate well, patient reported she does feel safe to drive herself home, patients family member did state he would follow patient in his car to keep appropriate visualization. MD was paged to update.

## 2018-11-02 NOTE — PLAN OF CARE
Patient discharged at 10:59 AM via wheel chair accompanied by family member and staff. Prescriptions - None ordered for discharge. All belongings sent with patient.     Discharge instructions reviewed with patient. Listed belongings gathered and returned to patient. yes    Patient discharged to home.   Report called to N/A    Core Measures and Vaccines  Core Measures applicable during stay: N/A. If yes, state diagnosis: N/A  Pneumonia and Influenza given prior to discharge, if indicated: N/A    Surgical Patient   Surgical Procedures during stay: N/A  Did patient receive discharge instruction on wound care and recognition of infection symptoms? N/A    MISC  Follow up appointment made:  Yes  Home and hospital aquired medications returned to patient: N/A  Patient reports pain was well managed at discharge: Yes

## 2018-11-02 NOTE — DISCHARGE SUMMARY
[facility]    Discharge Summary  Hospitalist    Date of Admission:  10/31/2018  Date of Discharge:  11/2/2018  Discharging Provider: Emanuel Albright MD  Date of Service (when I saw the patient): 11/02/18    Discharge Diagnoses   Active Problems:    Hyperkalemia (10/31/2018)    ALICIA 2/2 possible bactrim administration  Chronic problems:    HTN    HLD    CAD s/p CABG    S/p aortic valve replacement (bioprosthetic)    S/p thoracic aortic aneurysm repair    History of Present Illness   Katya Alcala is an 78 year old female who presented with hyperkalemia.  Please see admission H+P for additional details.    Hospital Course   Katya Alcala is a 78 year old female with h/o CAD s/p CABG, chronic afib on Eliquis, s/p aortic valve replacement (bioprosthetic), s/p thoracic aortic aneurysm repair (all of which in 2008), HTN, HLD who presents from clinic with K+ of 6.5 and ALICIA.  The patient did have hyperacute T waves on his admission EKG. she was monitored on telemetry, potassium shifted with insulin and glucose, and she was given IV fluids and Kayexalate.  Her lisinopril was also held.  Over the next 48 hours patient's potassium level and renal function recovered nicely.  On the morning of discharge, the patient is able to ambulate without difficulty.  Her potassium is 4.7 and her creatinine is 1.03.  We will have her hold her lisinopril until follow-up with Dr. Taylor.  Her blood pressure and heart rate are within acceptable limits on discharge.  Because of her renal failure and high potassium are somewhat uncertain, however there is moderate to strong suspicion for Bactrim being the culprit as she was taking this for treatment of UTI week prior.  We will add Bactrim to her allergy list in Epic.    Emanuel Albright MD    Significant Results and Procedures   See below.    Pending Results   These results will be followed up by Lokesh Taylor    Unresulted Labs Ordered in the Past 30 Days of this Admission      No orders found from 9/1/2018 to 11/1/2018.          Code Status   Full Code       Primary Care Physician   Lokesh Taylor    Physical Exam   Temp: 99.8  F (37.7  C) Temp src: Temporal BP: 154/70   Heart Rate: 77 Resp: 18 SpO2: 96 % O2 Device: None (Room air)    There were no vitals filed for this visit.  Vital Signs with Ranges  Temp:  [98.7  F (37.1  C)-99.8  F (37.7  C)] 99.8  F (37.7  C)  Heart Rate:  [77-94] 77  Resp:  [16-18] 18  BP: (120-154)/(62-70) 154/70  SpO2:  [96 %-100 %] 96 %  I/O last 3 completed shifts:  In: 2231 [P.O.:420; I.V.:1811]  Out: 1375 [Urine:1375]    Constitutional: AA, NAD  Eyes: PERRLA, no injection, no icterus  HEENT: atraumatic, normocephalic  Respiratory: CTA b/l  Cardiovascular: S1 S2 RRR  GI: soft, NT, ND, + bowel sounds  Lymph/Hematologic: no palpable lymphadenopathy  Skin: no rashes, no lesions  Musculoskeletal: No edema, good tone, no deformities  Neurologic: oriented x 3, no focal deficits  Psychiatric: appropriate affect      Discharge Disposition   Discharged to home  Condition at discharge: Stable    Consultations This Hospital Stay   None    Time Spent on this Encounter   Emanuel CUEVAS MD, personally saw the patient today and spent greater than 30 minutes discharging this patient.    Discharge Orders     Reason for your hospital stay   Dizziness, renal failure     Follow-up and recommended labs and tests    Follow up with primary care provider, Lokesh Taylor, within 7 days to evaluate medication change and for hospital follow- up.  The following labs/tests are recommended: bmp.     Activity   Your activity upon discharge: activity as tolerated     Full Code     Diet   Follow this diet upon discharge: Orders Placed This Encounter     Regular Diet Adult       Discharge Medications   Current Discharge Medication List      CONTINUE these medications which have NOT CHANGED    Details   allopurinol (ZYLOPRIM) 100 MG tablet Take 100 mg by mouth daily      amLODIPine (NORVASC)  10 MG tablet Take 10 mg by mouth daily      apixaban ANTICOAGULANT (ELIQUIS) 5 MG tablet Take 5 mg by mouth 2 times daily      isosorbide mononitrate (IMDUR) 30 MG 24 hr tablet Take 30 mg by mouth daily      Magnesium 400 MG TABS Take 400 mg by mouth daily      nadolol (CORGARD) 80 MG tablet Take 80 mg by mouth daily      Omega-3 Fatty Acids (OMEGA-3 CF) 1000 MG CAPS Take 1,000 mg by mouth 2 times daily       omeprazole (PRILOSEC) 20 MG CR capsule Take 20 mg by mouth daily      pravastatin (PRAVACHOL) 20 MG tablet Take 20 mg by mouth daily      traMADol (ULTRAM) 50 MG tablet Take 50 mg by mouth every 6 hours as needed for pain      nitroGLYcerin (NITROSTAT) 0.4 MG sublingual tablet Do not crush. Dissolve 1 tablet under the tongue at 1st sign of angina. Repeat in 5 minutes till relief. Max of 3 tabs/15 minutes.         STOP taking these medications       lisinopril (PRINIVIL/ZESTRIL) 20 MG tablet Comments:   Reason for Stopping:             Allergies   Allergies   Allergen Reactions     Atorvastatin Other (See Comments)     muscle aches     Codeine GI Disturbance     Causes GI upset (scanned record).     Crestor [Rosuvastatin] Muscle Pain (Myalgia)     Doxycycline      nausea     Meticorten Other (See Comments)     Can't sleep     Penicillins      Distant history of a rash  Injection site reaction     Prednisone Other (See Comments)     insomnia     Simvastatin Muscle Pain (Myalgia)     Data   Most Recent 3 CBC's:  Recent Labs   Lab Test  09/10/18   0522  09/09/18   1608  11/26/17   2205   WBC  7.7  7.4  8.3   HGB  11.5*  11.5*  9.0*   MCV  92  93  86   PLT  167  161  204      Most Recent 3 BMP's:  Recent Labs   Lab Test  11/02/18   0525  11/01/18   1745  11/01/18   1154  11/01/18   0510   10/31/18   1403   NA  143   --    --   140   --   137   POTASSIUM  4.7  4.4  5.3  5.4*   < >  6.7*   CHLORIDE  114*   --    --   114*   --   112*   CO2  26   --    --   23   --   21   BUN  13   --    --   29   --   35*   CR  1.03    --    --   1.65*   --   2.14*   ANIONGAP  3   --    --   3   --   4   EMMA  8.4*   --    --   8.6   --   10.2*   GLC  104*   --    --   119*   --   101*    < > = values in this interval not displayed.     Most Recent 2 LFT's:  Recent Labs   Lab Test  09/09/18   1608  02/08/14   0500   AST  16  13   ALT  9  12   ALKPHOS  73  86   BILITOTAL  0.6  0.3     Most Recent INR's and Anticoagulation Dosing History:  Anticoagulation Dose History     Recent Dosing and Labs Latest Ref Rng & Units 2/6/2014 2/8/2014    INR 0.80 - 1.20 0.92 0.98        Most Recent 3 Troponin's:  Recent Labs   Lab Test  09/09/18   2114  09/09/18   1620   TROPI  <0.030  <0.030     Most Recent Cholesterol Panel:No lab results found.  Most Recent 6 Bacteria Isolates From Any Culture (See EPIC Reports for Culture Details):  Recent Labs   Lab Test  10/31/18   1405  09/09/18 2003 02/07/14   0440  02/07/14   0223   CULT  No MRSA isolated  No growth  No Salmonella, Shigella, Campylobacter or E coli 0157 isolated. No Yersinia enterocolitica isolated  No MRSA isolated     Most Recent TSH, T4 and A1c Labs:No lab results found.  Results for orders placed or performed during the hospital encounter of 09/09/18   CT Head w/o Contrast    Narrative    PROCEDURE: CT HEAD W/O CONTRAST     HISTORY: fall, head injury; .    COMPARISON: None.    TECHNIQUE:  Helical images of the head from the foramen magnum to the  vertex were obtained without contrast.    FINDINGS: The ventricles and sulci are normal in volume. No acute  intracranial hemorrhage, mass effect, midline shift, hydrocephalus or  basilar cystern effacement are present.    The grey-white matter interface is preserved.    The calvarium is intact. The mastoid air cells are clear.  The  visualized paranasal sinuses are clear. There is a hematoma in the  right for head.      Impression    IMPRESSION: Normal brain      BLAKE BECKFORD MD   CT Cervical Spine w/o Contrast    Narrative    PROCEDURE: CT CERVICAL  SPINE W/O CONTRAST 9/9/2018 3:35 PM    HISTORY: Fall, neck pain;     COMPARISONS: None.    Meds/Dose Given:    TECHNIQUE: CT scan cervical spine with sagittal coronal  reconstructions    FINDINGS: There are degenerative changes of the middle atlantoaxial  joint. Calcifications are seen in the transverse ligament of the  atlas. There is decrease in height in the C3 C4 C4 C5 C5 C6 disc.  There are degenerative changes of the cervical facet joints at C2-C3  C3-C4 on the right. There is atherosclerotic plaquing at the carotid  bifurcations. Otherwise the prevertebral soft tissues appear normal.         Impression    IMPRESSION: Degenerative changes in cervical spine no fractures    BLAKE BECKFORD MD   XR Chest Port 1 View    Narrative    PROCEDURE:  XR CHEST PORT 1 VW    HISTORY:  fall; .     COMPARISON:  2014    FINDINGS:   Postoperative changes of an aortic valve replacement are seen. The  pulmonary vasculature is normal.  There is a small area of ill-defined  increase in density in the upper portion of the right lung seen best  between the anterior aspects of the second and third ribs on the  right. No pleural effusion or pneumothorax.      Impression    IMPRESSION:  Small right upper lobe opacity.      BLAKE BECKFORD MD   XR Pelvis w Hip Left 1 View    Narrative    XR PELVIS AND HIP LEFT 1 VIEW    HISTORY: s/p fall complaining of left hip pain;  .    TECHNIQUE: AP pelvis and one view left hip.    COMPARISON: 2/7/2014.    FINDINGS:    No acute fracture or retained foreign body is identified. Scattered  degenerative changes include low lumbar facet arthropathy, SI joint  degeneration, pubic symphysis degeneration and osteophytosis and joint  space calcification without joint space narrowing at the hips.        Impression    IMPRESSION:     No acute fracture.      STAN CAMPBELL MD

## 2018-11-02 NOTE — PLAN OF CARE
Problem: Patient Care Overview  Goal: Plan of Care/Patient Progress Review  -diagnostic tests and consults completed and resulted  -vital signs normal or at patient baseline  Nurse to notify provider when observation goals have been met and patient is ready for discharge.   Outcome: No Change  Pt A&Ox3. She reports mild generalized aching but declines any pain medication. Temp 99.7 and 98.8. HR 80-90s. /69 and 137/62. RR 18 and sating greater than 92% on room air. Lung sounds are clear. Bowel sounds are active. No stools this shift. Up with standby assist. Voiding well. IVF continued. Alarms on, call light within reach and pt calls appropriately.     Face to face report given with opportunity to observe patient.  Report given to EDD Smith.    Faviola Winslow  11/2/2018, 8:10 AM

## 2018-11-05 ENCOUNTER — TELEPHONE (OUTPATIENT)
Dept: CASE MANAGEMENT | Facility: HOSPITAL | Age: 78
End: 2018-11-05

## 2018-11-05 ENCOUNTER — RADIANT APPOINTMENT (OUTPATIENT)
Dept: MAMMOGRAPHY | Facility: OTHER | Age: 78
End: 2018-11-05
Attending: FAMILY MEDICINE
Payer: MEDICARE

## 2018-11-05 DIAGNOSIS — Z12.39 SCREENING BREAST EXAMINATION: ICD-10-CM

## 2018-11-05 PROCEDURE — 77067 SCR MAMMO BI INCL CAD: CPT | Mod: TC

## 2018-11-09 ENCOUNTER — TELEPHONE (OUTPATIENT)
Dept: CASE MANAGEMENT | Facility: HOSPITAL | Age: 78
End: 2018-11-09

## 2019-07-31 DIAGNOSIS — D64.9 ANEMIA: ICD-10-CM

## 2019-07-31 DIAGNOSIS — N19 RENAL FAILURE: ICD-10-CM

## 2019-07-31 DIAGNOSIS — E83.42 HYPOMAGNESEMIA: ICD-10-CM

## 2019-07-31 DIAGNOSIS — E87.5 HYPERKALEMIA: Primary | ICD-10-CM

## 2019-08-02 ENCOUNTER — HOSPITAL ENCOUNTER (OUTPATIENT)
Dept: NUTRITION | Facility: HOSPITAL | Age: 79
Discharge: HOME | End: 2019-08-02
Attending: FAMILY MEDICINE | Admitting: FAMILY MEDICINE
Payer: MEDICARE

## 2019-08-02 VITALS — WEIGHT: 140.43 LBS | BODY MASS INDEX: 25.84 KG/M2 | HEIGHT: 62 IN

## 2019-08-02 PROCEDURE — 97802 MEDICAL NUTRITION INDIV IN: CPT | Performed by: DIETITIAN, REGISTERED

## 2019-08-02 ASSESSMENT — MIFFLIN-ST. JEOR: SCORE: 1072.24

## 2019-08-02 NOTE — PROGRESS NOTES
"[facility]  Medical Nutrition Therapy    Visit Type: Initial Assessment    Katya Alcala referred for MNT related to CKD- elevated potassium, low iron    Nutrition Assessment:  Anthropometrics  Height: .  157 cm (5' 1.81\") BMI:    25.84   Weight:  63.7 kg (140 lb 6.9 oz) BSA:  1.67   IBW (kg):  Female: 49.35        Nutrition History  Pt is here to review diet recommendations for CKD and she also has questions about lowering lipid levels. She is a bit overwhelmed with the food lists she was given in the past. She has elevated potassium, low iron, and low magnesium. She does take a magnesium supplement. Suggested talking to her doctor about if an iron supplement would be appropriate. She is not much of a fruit/vegetable person. Goes out to eat about 2-3x a week. Likes soups and fried chicken. She is not drink much fluid, does not like to drink water.     Food Record- difficult to obtain. She had a hard time remembering what she typically eats or what she had yesterday.  Foods she likes to eat  - deli meat, egg salad or tuna salad sandwich  - popsicles   - canned soup or soup from the deli  - hamburgers  - fried chicken  - bakery goods  - chocolate  - potatoes    Physical Activity   not addressed      Nutrition Prescription- current weight  Energy:   1575-1900kcal (25-30kcal/kg)       Protein:   50-65g (0.8-1.0g/kg)         Fluid:   2000ml        Nutrition Diagnosis:   Elevated nutrition related lab related to kidney dysfunction as evidenced by potassium of 6.1.  Also low labs such as iron and magnesium.    Nutrition Intervention:   - Reviewed recommendations for CKD. Emphasized the lower potassium  - Provided some information on iron containing foods and combining with vitamin C  - Talked about reducing intake of high saturated fat/fried/salt foods    Nutrition Goals:   - reduce intake of potassium containing foods   - increase intake of fluids    Nutrition Follow Up / Monitoring:   diet recall, " weight, labs    Nutrition Recommendations:   choose more moderate to low potassium foods. Small portions of higher potassium containing foods. Reduce intake of high fat/fried/salted foods    Time spent with pt: 60 min    Patient to follow-up with RD in as pt desires.  Patient has RD contact information to call/email if needed.

## 2019-11-12 ENCOUNTER — ANCILLARY PROCEDURE (OUTPATIENT)
Dept: MAMMOGRAPHY | Facility: OTHER | Age: 79
End: 2019-11-12
Attending: FAMILY MEDICINE
Payer: MEDICARE

## 2019-11-12 DIAGNOSIS — Z12.31 VISIT FOR SCREENING MAMMOGRAM: ICD-10-CM

## 2019-11-12 PROCEDURE — 77063 BREAST TOMOSYNTHESIS BI: CPT | Mod: TC

## 2020-04-22 ENCOUNTER — APPOINTMENT (OUTPATIENT)
Dept: ULTRASOUND IMAGING | Facility: HOSPITAL | Age: 80
End: 2020-04-22
Attending: PHYSICIAN ASSISTANT
Payer: MEDICARE

## 2020-04-22 ENCOUNTER — HOSPITAL ENCOUNTER (EMERGENCY)
Facility: HOSPITAL | Age: 80
Discharge: ANOTHER HOSPITAL | End: 2020-04-22
Attending: PHYSICIAN ASSISTANT | Admitting: PHYSICIAN ASSISTANT
Payer: MEDICARE

## 2020-04-22 ENCOUNTER — TRANSFERRED RECORDS (OUTPATIENT)
Dept: HEALTH INFORMATION MANAGEMENT | Facility: CLINIC | Age: 80
End: 2020-04-22

## 2020-04-22 ENCOUNTER — APPOINTMENT (OUTPATIENT)
Dept: GENERAL RADIOLOGY | Facility: HOSPITAL | Age: 80
End: 2020-04-22
Attending: PHYSICIAN ASSISTANT
Payer: MEDICARE

## 2020-04-22 VITALS
HEART RATE: 98 BPM | SYSTOLIC BLOOD PRESSURE: 148 MMHG | DIASTOLIC BLOOD PRESSURE: 74 MMHG | TEMPERATURE: 98.5 F | WEIGHT: 134 LBS | RESPIRATION RATE: 16 BRPM | BODY MASS INDEX: 24.66 KG/M2 | HEIGHT: 62 IN | OXYGEN SATURATION: 97 %

## 2020-04-22 DIAGNOSIS — M00.9 SEPTIC ARTHRITIS OF RIGHT ANKLE, DUE TO UNSPECIFIED ORGANISM (H): ICD-10-CM

## 2020-04-22 LAB
ANION GAP SERPL CALCULATED.3IONS-SCNC: 3 MMOL/L (ref 3–14)
APPEARANCE FLD: NORMAL
BASOPHILS # BLD AUTO: 0.1 10E9/L (ref 0–0.2)
BASOPHILS NFR BLD AUTO: 0.8 %
BUN SERPL-MCNC: 27 MG/DL (ref 7–30)
CALCIUM SERPL-MCNC: 9.9 MG/DL (ref 8.5–10.1)
CHLORIDE SERPL-SCNC: 103 MMOL/L (ref 94–109)
CO2 SERPL-SCNC: 26 MMOL/L (ref 20–32)
COLOR FLD: YELLOW
CREAT SERPL-MCNC: 1.27 MG/DL (ref 0.52–1.04)
CRP SERPL-MCNC: 97.8 MG/L (ref 0–8)
CRYSTALS SNV MICRO: NORMAL
DIFFERENTIAL METHOD BLD: ABNORMAL
EOSINOPHIL # BLD AUTO: 0 10E9/L (ref 0–0.7)
EOSINOPHIL NFR BLD AUTO: 0.3 %
ERYTHROCYTE [DISTWIDTH] IN BLOOD BY AUTOMATED COUNT: 16.3 % (ref 10–15)
ERYTHROCYTE [SEDIMENTATION RATE] IN BLOOD BY WESTERGREN METHOD: 54 MM/H (ref 0–30)
GFR SERPL CREATININE-BSD FRML MDRD: 40 ML/MIN/{1.73_M2}
GLUCOSE SERPL-MCNC: 133 MG/DL (ref 70–99)
GRAM STN SPEC: ABNORMAL
HCT VFR BLD AUTO: 35.8 % (ref 35–47)
HGB BLD-MCNC: 11.8 G/DL (ref 11.7–15.7)
IMM GRANULOCYTES # BLD: 0.1 10E9/L (ref 0–0.4)
IMM GRANULOCYTES NFR BLD: 0.4 %
LYMPHOCYTES # BLD AUTO: 1.8 10E9/L (ref 0.8–5.3)
LYMPHOCYTES NFR BLD AUTO: 15.3 %
MCH RBC QN AUTO: 27.7 PG (ref 26.5–33)
MCHC RBC AUTO-ENTMCNC: 33 G/DL (ref 31.5–36.5)
MCV RBC AUTO: 84 FL (ref 78–100)
MONOCYTES # BLD AUTO: 1.3 10E9/L (ref 0–1.3)
MONOCYTES NFR BLD AUTO: 11.3 %
MONOS+MACROS NFR FLD MANUAL: 17 %
NEUTROPHILS # BLD AUTO: 8.3 10E9/L (ref 1.6–8.3)
NEUTROPHILS NFR BLD AUTO: 71.9 %
NEUTS BAND NFR FLD MANUAL: 83 %
NRBC # BLD AUTO: 0 10*3/UL
NRBC BLD AUTO-RTO: 0 /100
PLATELET # BLD AUTO: 231 10E9/L (ref 150–450)
POTASSIUM SERPL-SCNC: 4.5 MMOL/L (ref 3.4–5.3)
RBC # BLD AUTO: 4.26 10E12/L (ref 3.8–5.2)
RBC # FLD: 0 /UL
SODIUM SERPL-SCNC: 132 MMOL/L (ref 133–144)
SPECIMEN SOURCE FLD: NORMAL
SPECIMEN SOURCE SNV: NORMAL
SPECIMEN SOURCE: ABNORMAL
URATE SERPL-MCNC: 3.9 MG/DL (ref 2.6–6)
WBC # BLD AUTO: 11.5 10E9/L (ref 4–11)
WBC # FLD AUTO: NORMAL /UL

## 2020-04-22 PROCEDURE — 25000128 H RX IP 250 OP 636: Performed by: PHYSICIAN ASSISTANT

## 2020-04-22 PROCEDURE — 25000132 ZZH RX MED GY IP 250 OP 250 PS 637: Performed by: PHYSICIAN ASSISTANT

## 2020-04-22 PROCEDURE — 87205 SMEAR GRAM STAIN: CPT | Performed by: PHYSICIAN ASSISTANT

## 2020-04-22 PROCEDURE — 85025 COMPLETE CBC W/AUTO DIFF WBC: CPT | Performed by: PHYSICIAN ASSISTANT

## 2020-04-22 PROCEDURE — 80048 BASIC METABOLIC PNL TOTAL CA: CPT | Performed by: PHYSICIAN ASSISTANT

## 2020-04-22 PROCEDURE — 99285 EMERGENCY DEPT VISIT HI MDM: CPT | Mod: 25

## 2020-04-22 PROCEDURE — 73630 X-RAY EXAM OF FOOT: CPT | Mod: TC,RT,FY

## 2020-04-22 PROCEDURE — 20605 DRAIN/INJ JOINT/BURSA W/O US: CPT

## 2020-04-22 PROCEDURE — 93971 EXTREMITY STUDY: CPT | Mod: TC,RT

## 2020-04-22 PROCEDURE — 96365 THER/PROPH/DIAG IV INF INIT: CPT

## 2020-04-22 PROCEDURE — 96368 THER/DIAG CONCURRENT INF: CPT | Mod: XU

## 2020-04-22 PROCEDURE — 20605 DRAIN/INJ JOINT/BURSA W/O US: CPT | Performed by: PHYSICIAN ASSISTANT

## 2020-04-22 PROCEDURE — 85652 RBC SED RATE AUTOMATED: CPT | Performed by: PHYSICIAN ASSISTANT

## 2020-04-22 PROCEDURE — 89060 EXAM SYNOVIAL FLUID CRYSTALS: CPT | Performed by: PHYSICIAN ASSISTANT

## 2020-04-22 PROCEDURE — 87070 CULTURE OTHR SPECIMN AEROBIC: CPT | Performed by: PHYSICIAN ASSISTANT

## 2020-04-22 PROCEDURE — 25800030 ZZH RX IP 258 OP 636: Performed by: PHYSICIAN ASSISTANT

## 2020-04-22 PROCEDURE — 87040 BLOOD CULTURE FOR BACTERIA: CPT | Performed by: PHYSICIAN ASSISTANT

## 2020-04-22 PROCEDURE — 89051 BODY FLUID CELL COUNT: CPT | Performed by: PHYSICIAN ASSISTANT

## 2020-04-22 PROCEDURE — 73610 X-RAY EXAM OF ANKLE: CPT | Mod: TC,RT,FY

## 2020-04-22 PROCEDURE — 99285 EMERGENCY DEPT VISIT HI MDM: CPT | Mod: 25 | Performed by: PHYSICIAN ASSISTANT

## 2020-04-22 PROCEDURE — 86140 C-REACTIVE PROTEIN: CPT | Performed by: PHYSICIAN ASSISTANT

## 2020-04-22 PROCEDURE — 84550 ASSAY OF BLOOD/URIC ACID: CPT | Performed by: PHYSICIAN ASSISTANT

## 2020-04-22 PROCEDURE — 36415 COLL VENOUS BLD VENIPUNCTURE: CPT | Performed by: PHYSICIAN ASSISTANT

## 2020-04-22 RX ORDER — CEFTRIAXONE SODIUM 2 G/50ML
2 INJECTION, SOLUTION INTRAVENOUS ONCE
Status: COMPLETED | OUTPATIENT
Start: 2020-04-22 | End: 2020-04-22

## 2020-04-22 RX ORDER — OXYCODONE AND ACETAMINOPHEN 5; 325 MG/1; MG/1
1 TABLET ORAL ONCE
Status: COMPLETED | OUTPATIENT
Start: 2020-04-22 | End: 2020-04-22

## 2020-04-22 RX ADMIN — VANCOMYCIN HYDROCHLORIDE 1500 MG: 1 INJECTION, POWDER, LYOPHILIZED, FOR SOLUTION INTRAVENOUS at 19:15

## 2020-04-22 RX ADMIN — CEFTRIAXONE SODIUM 2 G: 2 INJECTION, SOLUTION INTRAVENOUS at 19:09

## 2020-04-22 RX ADMIN — OXYCODONE HYDROCHLORIDE AND ACETAMINOPHEN 1 TABLET: 5; 325 TABLET ORAL at 14:38

## 2020-04-22 RX ADMIN — SODIUM CHLORIDE 1000 ML: 9 INJECTION, SOLUTION INTRAVENOUS at 19:19

## 2020-04-22 RX ADMIN — OXYCODONE HYDROCHLORIDE AND ACETAMINOPHEN 1 TABLET: 5; 325 TABLET ORAL at 15:26

## 2020-04-22 ASSESSMENT — MIFFLIN-ST. JEOR: SCORE: 1038.07

## 2020-04-22 NOTE — ED PROVIDER NOTES
History     Chief Complaint   Patient presents with     Foot Pain     right     HPI  Katya Alcala is a 79 year old female who presents here today with right ankle pain and swelling there is little bit of erythema.  Started around 3 or 4 days ago with increasing swelling and pain today she cannot bear weight on it.  She has not noticed any rigors.  She has not noted a fever.  Temp is 99.8 here in presentation no.  She is a chest pain or shortness of breath.  She does have a history of blood clots she spoke with her on the phone today they are concerned about blood clots so she is sent here.  She is on Eliquis.  She really has not felt ill there is no vomiting diarrhea no lightheadedness.    Allergies:  Allergies   Allergen Reactions     Bactrim [Sulfamethoxazole W/Trimethoprim] Other (See Comments)     Renal failure, hyperkalemia     Atorvastatin Other (See Comments)     muscle aches     Codeine GI Disturbance     Causes GI upset (scanned record).     Crestor [Rosuvastatin] Muscle Pain (Myalgia)     Doxycycline      nausea     Meticorten Other (See Comments)     Can't sleep     Penicillins      Distant history of a rash  Injection site reaction     Prednisone Other (See Comments)     insomnia     Simvastatin Muscle Pain (Myalgia)       Problem List:    Patient Active Problem List    Diagnosis Date Noted     Hyperkalemia 10/31/2018     Priority: Medium     Head injury 09/09/2018     Priority: Medium     Hypomagnesemia 09/09/2018     Priority: Medium     Iron deficiency anemia due to chronic blood loss 01/20/2018     Priority: Medium     Long term (current) use of opiate analgesic 11/21/2017     Priority: Medium     Atrial fibrillation, chronic 06/12/2017     Priority: Medium     Tubular adenoma of colon 03/09/2014     Priority: Medium     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 Provider to review       Lower GI bleeding 02/07/2014     Priority: Medium     Gout 05/13/2010     Priority: Medium     Overview:   Updated per 10/1/17 IMO import       Status post coronary artery bypass graft 04/22/2009     Priority: Medium     Overview:   IMO Update 10/11       S/P thoracic aortic aneurysm repair 04/22/2009     Priority: Medium     Overview:   IMO Update 10/11       Impaired fasting glucose 12/18/2008     Priority: Medium     Essential hypertension 08/25/2006     Priority: Medium     Overview:   IMO Update       Hyperlipidemia 08/25/2006     Priority: Medium     Overview:   IMO Update 10/11       Fibromyalgia 10/20/2005     Priority: Medium     Overview:           Past Medical History:    Past Medical History:   Diagnosis Date     Atrial fibrillation, chronic 6/12/2017     Coronary artery disease with stable angina pectoris (H) 2/7/2014     Essential hypertension 8/25/2006     Fibromyalgia 10/20/2005     Gout 5/13/2010     Hyperlipidemia 8/25/2006     Hypertension 5/10/2004     Impaired fasting glucose 12/18/2008     Iron deficiency anemia due to chronic blood loss 1/20/2018     Long term (current) use of opiate analgesic 11/21/2017     S/P aortic valve replacement 2/7/2014     S/P thoracic aortic aneurysm repair 4/22/2009     Status post coronary artery bypass graft 4/22/2009     Tubular adenoma of colon 3/9/2014       Past Surgical History:    Past Surgical History:   Procedure Laterality Date     AORTIC VALVE REPLACEMENT  05/2008    Mitroflow [employer] bioprosthesis     APPENDECTOMY       C OB/GYN PROCEDURE                          DATE:  1986    Vaginal granulation tissue removed     CARDIAC CATHERIZATION  01/04/2016     COLONOSCOPY      and polypectomy     HISTORY OF CABG  05/2008    Single vessel     HISTORY OF CATARACT REPAIR Right 05/09/2011     HISTORY OF THORACIC AORTIC ANEURYSM REPAIR  05/2008     "Ascending aorta, tube graft     SIOBHAN/BSO  1984    Benign fibroid     TONSILLECTOMY & ADENOIDECTOMY  1949       Family History:    Family History   Problem Relation Age of Onset     Diabetes Mother      Myocardial Infarction Father         50s     Kidney Cancer Brother      Diabetes Son        Social History:  Marital Status:   [5]  Social History     Tobacco Use     Smoking status: Never Smoker     Smokeless tobacco: Never Used   Substance Use Topics     Alcohol use: No     Drug use: No        Medications:    allopurinol (ZYLOPRIM) 100 MG tablet  amLODIPine (NORVASC) 10 MG tablet  apixaban ANTICOAGULANT (ELIQUIS) 5 MG tablet  isosorbide mononitrate (IMDUR) 30 MG 24 hr tablet  Magnesium 400 MG TABS  nadolol (CORGARD) 80 MG tablet  Omega-3 Fatty Acids (OMEGA-3 CF) 1000 MG CAPS  omeprazole (PRILOSEC) 20 MG CR capsule  pravastatin (PRAVACHOL) 20 MG tablet  traMADol (ULTRAM) 50 MG tablet  nitroGLYcerin (NITROSTAT) 0.4 MG sublingual tablet          Review of Systems  I did do a complete 10 point review of systems. Pertinent positives and negatives listed per HPI. All other systems have been reviewed and are negative.      Physical Exam   BP: 156/78  Pulse: 93  Temp: 99.8  F (37.7  C)  Resp: 18  Height: 157 cm (5' 1.81\")  Weight: 60.8 kg (134 lb)  SpO2: 97 %      Physical Exam  Vitals signs and nursing note reviewed.   Constitutional:       General: She is not in acute distress.     Appearance: Normal appearance. She is normal weight. She is not ill-appearing, toxic-appearing or diaphoretic.   HENT:      Head: Normocephalic and atraumatic.      Nose: Nose normal.      Mouth/Throat:      Mouth: Mucous membranes are moist.      Pharynx: Oropharynx is clear.   Eyes:      General: No scleral icterus.     Conjunctiva/sclera: Conjunctivae normal.   Neck:      Musculoskeletal: Neck supple.   Cardiovascular:      Rate and Rhythm: Normal rate.      Pulses: Normal pulses.   Pulmonary:      Effort: Pulmonary effort is normal. " No respiratory distress.      Breath sounds: Normal breath sounds.   Abdominal:      General: Abdomen is flat.      Palpations: Abdomen is soft.      Tenderness: There is no abdominal tenderness.   Musculoskeletal:         General: Swelling and tenderness present.      Comments: Swelling tenderness a little bit of warmth to the right ankle and foot.  She has good pulse sensations intact.  She is not able to move the joint at all.   Skin:     General: Skin is warm.      Capillary Refill: Capillary refill takes less than 2 seconds.      Findings: Erythema present.   Neurological:      General: No focal deficit present.      Mental Status: She is alert and oriented to person, place, and time.   Psychiatric:         Mood and Affect: Mood normal.         Behavior: Behavior normal.         Thought Content: Thought content normal.         Judgment: Judgment normal.         ED Course     ED Course as of Apr 22 1849 Wed Apr 22, 2020 1820 Synovial Fluid Source: Synovial fluid   She has history of gout.  Differentials include gout versus septic joint.  Does not appear to cellulitis she is exquisitely tender not able to move the joint itself appears it is intra-articular.  Laboratories will count 11.5.  CRP is greater than 100.  Sed rate is over 50.  Metabolic panel shows little bit of renal insufficiency.  I did call and consult with [facility] orthopedics regarding this patient.  I felt the patient likely did a arthrocentesis of the ankle.  My concern was that she is on Eliquis.  After speaking with orthopedics we decided that the benefit outweighed the risk because ruling out septic joint.  Procedures: The area was prepped and draped with sterile dressings.  Betadine was used to prep the skin.  Using sterile technique I injected the area with 1% lidocaine with epinephrine using about 4 cc.  Once the skin was anesthetized I used the lateral approach with a 18-gauge needle and 10 cc syringe advanced while pulling back and  I was able to hit the joint space I removed 5 or 6 cc of cloudy synovial fluid.  Fluid was sent for analysis.  Patient tolerated this procedure extremely well.  Written consent was obtained prior to procedure.  Synovial fluid analysis reveals no crystals.  He does reveal 69,000 white cells.  Reveals rare gram-positive cocci.  Cultures pending.  Given these findings it is likely she has septic joint.  I did call again and speak with [facility] orthopedics who recommends transferring the patient to them should be n.p.o. after midnight.  She will be transferred via ALS.  She will be admitted to the hospitalist service there Dr. Bernstein.  Patient received vancomycin here as well as 2 g IV Rocephin.  She received a liter normal saline as well.  She received 2 Percocet for pain that provided her with adequate pain control.  Try to obtain and see obvious fractures on the x-rays ultrasound also obtained was negative.  A pressure dressing was applied after the tap to try to alleviate any bleeding since she is on Eliquis.  There was no bleeding immediately afterwards.           Results for orders placed or performed during the hospital encounter of 04/22/20 (from the past 24 hour(s))   CBC with platelets differential   Result Value Ref Range    WBC 11.5 (H) 4.0 - 11.0 10e9/L    RBC Count 4.26 3.8 - 5.2 10e12/L    Hemoglobin 11.8 11.7 - 15.7 g/dL    Hematocrit 35.8 35.0 - 47.0 %    MCV 84 78 - 100 fl    MCH 27.7 26.5 - 33.0 pg    MCHC 33.0 31.5 - 36.5 g/dL    RDW 16.3 (H) 10.0 - 15.0 %    Platelet Count 231 150 - 450 10e9/L    Diff Method Automated Method     % Neutrophils 71.9 %    % Lymphocytes 15.3 %    % Monocytes 11.3 %    % Eosinophils 0.3 %    % Basophils 0.8 %    % Immature Granulocytes 0.4 %    Nucleated RBCs 0 0 /100    Absolute Neutrophil 8.3 1.6 - 8.3 10e9/L    Absolute Lymphocytes 1.8 0.8 - 5.3 10e9/L    Absolute Monocytes 1.3 0.0 - 1.3 10e9/L    Absolute Eosinophils 0.0 0.0 - 0.7 10e9/L    Absolute Basophils 0.1 0.0  - 0.2 10e9/L    Abs Immature Granulocytes 0.1 0 - 0.4 10e9/L    Absolute Nucleated RBC 0.0    Erythrocyte sedimentation rate auto   Result Value Ref Range    Sed Rate 54 (H) 0 - 30 mm/h   Basic metabolic panel   Result Value Ref Range    Sodium 132 (L) 133 - 144 mmol/L    Potassium 4.5 3.4 - 5.3 mmol/L    Chloride 103 94 - 109 mmol/L    Carbon Dioxide 26 20 - 32 mmol/L    Anion Gap 3 3 - 14 mmol/L    Glucose 133 (H) 70 - 99 mg/dL    Urea Nitrogen 27 7 - 30 mg/dL    Creatinine 1.27 (H) 0.52 - 1.04 mg/dL    GFR Estimate 40 (L) >60 mL/min/[1.73_m2]    GFR Estimate If Black 47 (L) >60 mL/min/[1.73_m2]    Calcium 9.9 8.5 - 10.1 mg/dL   CRP inflammation   Result Value Ref Range    CRP Inflammation 97.8 (H) 0.0 - 8.0 mg/L   Uric acid   Result Value Ref Range    Uric Acid 3.9 2.6 - 6.0 mg/dL   US Lower Extremity Venous Duplex Right    Narrative    US LOWER EXTREMITY VENOUS DUPLEX RIGHT  4/22/2020 3:01 PM    History:Female, age 79 years; ; swelling pain hx of clot    Comparison: None.    Technique: Grayscale and color Doppler ultrasound of the right  lower  extremity deep venous structures.    Findings:   The deep venous structures are patent and fully compressible. There is  normal augmentation of flow.      Impression    Impression:  1.  No evidence of DVT.     2.  4.9 x 1.0 x 1.4 cm Hernández's cyst.    FAM HUMPHRIES MD   Foot  XR, G/E 3 views, right    Narrative    Exam: XR FOOT RT G/E 3 VW, XR ANKLE RT G/E 3 VW     History:Female, age 79 years, swelling pain    Comparison:  None    Technique: Three views of the right foot and right ankle are  submitted.    Findings: Bones moderately osteopenic. Tiny calcific density adjacent  to the lateral malleolus suggesting avulsion injury, age unknown. No  evidence of dislocation.           Impression    Impression:  Generalized osteopenia and marked soft tissue swelling with suggestion  of an avulsion injury involving the anterior talofibular ligament.    FAM HUMPHRIES MD    Ankle XR, G/E 3 views, right    Narrative    Exam: XR FOOT RT G/E 3 VW, XR ANKLE RT G/E 3 VW     History:Female, age 79 years, swelling pain    Comparison:  None    Technique: Three views of the right foot and right ankle are  submitted.    Findings: Bones moderately osteopenic. Tiny calcific density adjacent  to the lateral malleolus suggesting avulsion injury, age unknown. No  evidence of dislocation.           Impression    Impression:  Generalized osteopenia and marked soft tissue swelling with suggestion  of an avulsion injury involving the anterior talofibular ligament.    FAM HUMPHRIES MD   Crystal ID Synovial Fluid   Result Value Ref Range    Synovial Fluid Source Synovial fluid     Crystal Analysis No clincally significant crystals seen.  NOCRYS^No clincally significant crystals seen.   Cell count with differential fluid   Result Value Ref Range    Body Fluid Analysis Source Synovial fluid     % Neutrophils Fluid 83 %    % Mono/Macro Fluid 17 %    Color Fluid Yellow     Appearance Fluid Cloudy     RBC Fluid 0 /uL    WBC Fluid 18719 /uL       Medications   vancomycin (VANCOCIN) 1,500 mg in sodium chloride 0.9 % 500 mL intermittent infusion (has no administration in time range)   cefTRIAXone IN D5W (ROCEPHIN) intermittent infusion 2 g (has no administration in time range)   0.9% sodium chloride BOLUS (has no administration in time range)   oxyCODONE-acetaminophen (PERCOCET) 5-325 MG per tablet 1 tablet (1 tablet Oral Given 4/22/20 1438)   oxyCODONE-acetaminophen (PERCOCET) 5-325 MG per tablet 1 tablet (1 tablet Oral Given 4/22/20 1526)       Assessments & Plan (with Medical Decision Making)     I have reviewed the nursing notes.    I have reviewed the findings, diagnosis, plan and need for follow up with the patient.  Transfer to [facility] via ALS.    New Prescriptions    No medications on file       Final diagnoses:   Septic arthritis of right ankle, due to unspecified organism (H)       4/22/2020   [facility]  EMERGENCY DEPARTMENT

## 2020-04-22 NOTE — ED NOTES
"Pt presents today via EMS after her PCP advised her to come to ED for evaluation. Pt states 3 days ago there was a nagging pain that started behind her RT knee. Pain has progressed down her leg and today pt has a painful RT ankle and foot, so painful she is unable to bear weight on foot or ambulate.   RT foot is swollen +2, slight bruising to lateral/topsode of foot, sensitive to slightest touch especially at the lateral ankle. CMS intact.   Pt states she is on Eliquis for HX of \"irregular heartbeats\".   "

## 2020-04-23 ENCOUNTER — TRANSFERRED RECORDS (OUTPATIENT)
Dept: HEALTH INFORMATION MANAGEMENT | Facility: CLINIC | Age: 80
End: 2020-04-23

## 2020-04-23 NOTE — ED NOTES
Giovanny FRANK went over procedural consent form with pt. Pt signed and verbalized understanding. Provider performed joint aspiration procedure on right ankle. Synovial fluid brought to the lab. Pt tolerated well. Pressure dressing applied to right ankle per provider's orders. CMS intact.

## 2020-04-28 LAB
BACTERIA SPEC CULT: NORMAL
BACTERIA SPEC CULT: NORMAL
Lab: NORMAL
Lab: NORMAL
SPECIMEN SOURCE: NORMAL
SPECIMEN SOURCE: NORMAL

## 2020-04-29 LAB
BACTERIA SPEC CULT: NO GROWTH
SPECIMEN SOURCE: NORMAL

## 2020-05-06 DIAGNOSIS — Z79.891 LONG TERM (CURRENT) USE OF OPIATE ANALGESIC: Primary | ICD-10-CM

## 2020-05-06 RX ORDER — HYDROCODONE BITARTRATE AND ACETAMINOPHEN 5; 325 MG/1; MG/1
1-2 TABLET ORAL EVERY 6 HOURS PRN
Qty: 14 TABLET | Refills: 0 | Status: SHIPPED | OUTPATIENT
Start: 2020-05-06 | End: 2020-05-11

## 2020-05-06 NOTE — TELEPHONE ENCOUNTER
Hydrocodone/APAP 5/325mg      Last Written Prescription Date:  Not on list  Last Fill Quantity: -,   # refills: -  Last Office Visit: none on file  Future Office visit:       Routing refill request to provider for review/approval because:  Drug not on the [facility], P or  Health refill protocol or controlled substance    Pt is a resident of [facility]. Please advise. Thank you!

## 2020-05-11 DIAGNOSIS — Z79.891 LONG TERM (CURRENT) USE OF OPIATE ANALGESIC: ICD-10-CM

## 2020-05-11 RX ORDER — HYDROCODONE BITARTRATE AND ACETAMINOPHEN 5; 325 MG/1; MG/1
1-2 TABLET ORAL EVERY 6 HOURS PRN
Qty: 14 TABLET | Refills: 0 | Status: SHIPPED | OUTPATIENT
Start: 2020-05-11 | End: 2020-05-19

## 2020-05-11 NOTE — TELEPHONE ENCOUNTER
Controlled Substance Refill Request for HYDROcodone-acetaminophen (NORCO) 5-325 MG tablet     Problem List Complete:  Yes    Last Written Prescription Date:  5/6/20  Last Fill Quantity: 14,   # refills: 0    THE MOST RECENT OFFICE VISIT MUST BE WITHIN THE PAST 3 MONTHS. AT LEAST ONE FACE TO FACE VISIT MUST OCCUR EVERY 6 MONTHS. ADDITIONAL VISITS CAN BE VIRTUAL.  (THIS STATEMENT SHOULD BE DELETED.)    Last Office Visit with [facility] primary care provider: ED on 4/22/20    Future Office visit:     Controlled substance agreement:   Encounter-Level CSA:    There are no encounter-level csa.     Patient-Level CSA:    There are no patient-level csa.         Last Urine Drug Screen: No results found for: CDAUT, No results found for: COMDAT, No results found for: THC13, PCP13, COC13, MAMP13, OPI13, AMP13, BZO13, TCA13, MTD13, BAR13, OXY13, PPX13, BUP13     Processing:  Rx to be electronically transmitted to pharmacy by provider     [email or web address]   checked in past 3 months?

## 2020-05-19 DIAGNOSIS — Z79.891 LONG TERM (CURRENT) USE OF OPIATE ANALGESIC: ICD-10-CM

## 2020-05-19 RX ORDER — HYDROCODONE BITARTRATE AND ACETAMINOPHEN 5; 325 MG/1; MG/1
1-2 TABLET ORAL EVERY 6 HOURS PRN
Qty: 120 TABLET | Refills: 0 | Status: SHIPPED | OUTPATIENT
Start: 2020-05-19 | End: 2023-01-01 | Stop reason: ALTCHOICE

## 2020-05-19 NOTE — TELEPHONE ENCOUNTER
Pharmacy requesting 120 tablets.    HYDROcodone-acetaminophen (NORCO) 5-325 MG tablet       Last Written Prescription Date:  05/11/20  Last Fill Quantity: 14,   # refills: 0  Last Office Visit: Nursing home patient  Future Office visit:       Routing refill request to provider for review/approval because:  Drug not on the [facility], P or [facility] refill protocol or controlled substance

## 2020-06-02 ENCOUNTER — MEDICAL CORRESPONDENCE (OUTPATIENT)
Dept: HEALTH INFORMATION MANAGEMENT | Facility: CLINIC | Age: 80
End: 2020-06-02

## 2022-01-20 ENCOUNTER — HOSPITAL ENCOUNTER (EMERGENCY)
Facility: HOSPITAL | Age: 82
Discharge: HOME | End: 2022-01-20
Attending: STUDENT IN AN ORGANIZED HEALTH CARE EDUCATION/TRAINING PROGRAM | Admitting: STUDENT IN AN ORGANIZED HEALTH CARE EDUCATION/TRAINING PROGRAM
Payer: MEDICARE

## 2022-01-20 ENCOUNTER — APPOINTMENT (OUTPATIENT)
Dept: CT IMAGING | Facility: HOSPITAL | Age: 82
End: 2022-01-20
Attending: STUDENT IN AN ORGANIZED HEALTH CARE EDUCATION/TRAINING PROGRAM
Payer: MEDICARE

## 2022-01-20 VITALS
SYSTOLIC BLOOD PRESSURE: 139 MMHG | OXYGEN SATURATION: 98 % | HEART RATE: 81 BPM | DIASTOLIC BLOOD PRESSURE: 75 MMHG | TEMPERATURE: 97.3 F | RESPIRATION RATE: 18 BRPM

## 2022-01-20 DIAGNOSIS — S00.81XA ABRASION OF FACE, INITIAL ENCOUNTER: ICD-10-CM

## 2022-01-20 DIAGNOSIS — S09.90XA HEAD INJURY: ICD-10-CM

## 2022-01-20 DIAGNOSIS — S09.90XA INJURY OF HEAD, INITIAL ENCOUNTER: ICD-10-CM
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
## 2022-01-20 DIAGNOSIS — Z79.01 ANTICOAGULATED: ICD-10-CM

## 2022-01-20 LAB
HOLD SPECIMEN: NORMAL
INR PPP: 1.3 (ref 0.85–1.15)

## 2022-01-20 PROCEDURE — 72125 CT NECK SPINE W/O DYE: CPT

## 2022-01-20 PROCEDURE — 70486 CT MAXILLOFACIAL W/O DYE: CPT

## 2022-01-20 PROCEDURE — 85610 PROTHROMBIN TIME: CPT | Performed by: STUDENT IN AN ORGANIZED HEALTH CARE EDUCATION/TRAINING PROGRAM

## 2022-01-20 PROCEDURE — 70450 CT HEAD/BRAIN W/O DYE: CPT

## 2022-01-20 PROCEDURE — 36415 COLL VENOUS BLD VENIPUNCTURE: CPT | Performed by: STUDENT IN AN ORGANIZED HEALTH CARE EDUCATION/TRAINING PROGRAM

## 2022-01-20 PROCEDURE — 99284 EMERGENCY DEPT VISIT MOD MDM: CPT | Mod: 25

## 2022-01-20 PROCEDURE — 99284 EMERGENCY DEPT VISIT MOD MDM: CPT | Performed by: STUDENT IN AN ORGANIZED HEALTH CARE EDUCATION/TRAINING PROGRAM

## 2022-01-21 NOTE — DISCHARGE INSTRUCTIONS
- Please do not take your Eliquis until Sunday  - Please return to the Emergency Room if you do not improve, feel worse, or have any new or concerning symptoms. We would especially want to see you back if you experience worsening confusion or vomiting.  - Please follow up with a primary care physician in 3-4 days to check your concussion symptoms and ensure you are not developing any new problems from your fall

## 2022-01-21 NOTE — ED TRIAGE NOTES
"Patient ambulatory to triage with son accompanying. Patient was leaving \"[email or web address]\" when she slipped on the sidewalk and fell. Patient has abrasion to right cheek and bruise to right eyebrow. Patient denies LOC, but is feeling dizzy. Patient is currently on Elequis.   "

## 2022-01-21 NOTE — ED PROVIDER NOTES
"  History     Chief Complaint   Patient presents with     Fall     slipped on ice on sidewalk     Head Injury     abrasions to right cheek and bruis to right eyebrow     HPI  Katya Alcala is a 81 year old female with PMH A fib on Eliquis, hypertension, GI bleeding, aortic valve with porcine replacement presenting after a mechanical fall. Was outside, tripped on ledge on sidewalk. Fell, hit head/face. No LOC. No N/V. No confusion. She is on eliquis for A fib, and states she does not require anticoagulation for her valve. No presyncopal/prodromal symptoms. Feels somewhat a little \"dazed/dizzy\" after fall but improving now. Normal vision. Normally has larger right pupil than left. No SOB/CP. Does have some mild left knee pain but able to ambulate and right shoulder discofmort but can fully range and states \"I didn't break anything, it's just sore.\"    Allergies:  Allergies   Allergen Reactions     Bactrim [Sulfamethoxazole W/Trimethoprim] Other (See Comments)     Renal failure, hyperkalemia     Atorvastatin Other (See Comments)     muscle aches     Codeine GI Disturbance     Causes GI upset (scanned record).     Crestor [Rosuvastatin] Muscle Pain (Myalgia)     Doxycycline      nausea     Meticorten Other (See Comments)     Can't sleep     Penicillins      Distant history of a rash  Injection site reaction     Prednisone Other (See Comments)     insomnia     Simvastatin Muscle Pain (Myalgia)       Problem List:    Patient Active Problem List    Diagnosis Date Noted     Hyperkalemia 10/31/2018     Priority: Medium     Head injury 09/09/2018     Priority: Medium     Hypomagnesemia 09/09/2018     Priority: Medium     Iron deficiency anemia due to chronic blood loss 01/20/2018     Priority: Medium     Long term (current) use of opiate analgesic 11/21/2017     Priority: Medium     Atrial fibrillation, chronic (H) 06/12/2017     Priority: Medium     Tubular adenoma of colon 03/09/2014     Priority: Medium     Coronary " artery disease with stable angina pectoris (H) 02/07/2014     Priority: Medium     S/p 2 vessel bypass       S/P aortic valve replacement 02/07/2014     Priority: Medium     Overview:   IMO Update 10/11  Problem list name updated by automated process. Provider to review       Lower GI bleeding 02/07/2014     Priority: Medium     Gout 05/13/2010     Priority: Medium     Overview:   Updated per 10/1/17 IMO import       Status post coronary artery bypass graft 04/22/2009     Priority: Medium     Overview:   IMO Update 10/11       S/P thoracic aortic aneurysm repair 04/22/2009     Priority: Medium     Overview:   IMO Update 10/11       Impaired fasting glucose 12/18/2008     Priority: Medium     Essential hypertension 08/25/2006     Priority: Medium     Overview:   IMO Update       Hyperlipidemia 08/25/2006     Priority: Medium     Overview:   IMO Update 10/11       Fibromyalgia 10/20/2005     Priority: Medium     Overview:           Past Medical History:    Past Medical History:   Diagnosis Date     Atrial fibrillation, chronic 6/12/2017     Coronary artery disease with stable angina pectoris (H) 2/7/2014     Essential hypertension 8/25/2006     Fibromyalgia 10/20/2005     Gout 5/13/2010     Hyperlipidemia 8/25/2006     Hypertension 5/10/2004     Impaired fasting glucose 12/18/2008     Iron deficiency anemia due to chronic blood loss 1/20/2018     Long term (current) use of opiate analgesic 11/21/2017     S/P aortic valve replacement 2/7/2014     S/P thoracic aortic aneurysm repair 4/22/2009     Status post coronary artery bypass graft 4/22/2009     Tubular adenoma of colon 3/9/2014       Past Surgical History:    Past Surgical History:   Procedure Laterality Date     AORTIC VALVE REPLACEMENT  05/2008    Mitroflow [employer] bioprosthesis     APPENDECTOMY       C OB/GYN PROCEDURE                          DATE:  1986    Vaginal granulation tissue removed     CARDIAC CATHERIZATION  01/04/2016     COLONOSCOPY      and  polypectomy     HISTORY OF CABG  05/2008    Single vessel     HISTORY OF CATARACT REPAIR Right 05/09/2011     HISTORY OF THORACIC AORTIC ANEURYSM REPAIR  05/2008    Ascending aorta, tube graft     SIOBHAN/BSO  1984    Benign fibroid     TONSILLECTOMY & ADENOIDECTOMY  1949       Family History:    Family History   Problem Relation Age of Onset     Diabetes Mother      Myocardial Infarction Father         50s     Kidney Cancer Brother      Diabetes Son        Social History:  Marital Status:   [5]  Social History     Tobacco Use     Smoking status: Never Smoker     Smokeless tobacco: Never Used   Substance Use Topics     Alcohol use: No     Drug use: No        Medications:    allopurinol (ZYLOPRIM) 100 MG tablet  amLODIPine (NORVASC) 10 MG tablet  apixaban ANTICOAGULANT (ELIQUIS) 5 MG tablet  HYDROcodone-acetaminophen (NORCO) 5-325 MG tablet  isosorbide mononitrate (IMDUR) 30 MG 24 hr tablet  Magnesium 400 MG TABS  nadolol (CORGARD) 80 MG tablet  nitroGLYcerin (NITROSTAT) 0.4 MG sublingual tablet  Omega-3 Fatty Acids (OMEGA-3 CF) 1000 MG CAPS  omeprazole (PRILOSEC) 20 MG CR capsule  pravastatin (PRAVACHOL) 20 MG tablet  traMADol (ULTRAM) 50 MG tablet          Review of Systems   All other systems reviewed and are negative.      Physical Exam   BP: 139/75  Pulse: 78  Temp: 97.3  F (36.3  C)  Resp: 18  SpO2: 98 %      Physical Exam  Vitals and nursing note reviewed.   Constitutional:       Appearance: Normal appearance.   HENT:      Head: Normocephalic.      Comments: Abrasion to rigth cheek, upper and lower lips, contusion to right lateral supraorbital region     Right Ear: External ear normal.      Left Ear: External ear normal.      Nose: Nose normal.      Mouth/Throat:      Mouth: Mucous membranes are moist.      Pharynx: Oropharynx is clear.   Eyes:      Extraocular Movements: Extraocular movements intact.      Comments: Right pupil 4mm, left pupil 3mm, bilaterally reactive   Cardiovascular:      Rate and  Rhythm: Normal rate and regular rhythm.      Pulses: Normal pulses.      Heart sounds: Normal heart sounds.   Pulmonary:      Effort: Pulmonary effort is normal.      Breath sounds: Normal breath sounds.   Abdominal:      General: Abdomen is flat.      Palpations: Abdomen is soft.   Musculoskeletal:         General: No tenderness. Normal range of motion.      Cervical back: Normal range of motion.   Skin:     General: Skin is warm and dry.      Capillary Refill: Capillary refill takes less than 2 seconds.   Neurological:      General: No focal deficit present.      Mental Status: She is alert and oriented to person, place, and time.   Psychiatric:         Mood and Affect: Mood normal.         Behavior: Behavior normal.         ED Course        ED Course as of 01/20/22 2139   Thu Jan 20, 2022 2001 Enlarged right pupil is baseline per patient. Has skin abrasion and tenderness to right cheek. Also has bloody lip but no lacerations.    2049 Head CT w/o contrast  No acute bleed.   2051 Cervical spine CT w/o contrast  No acute fracture.   2102 CT Facial Bones without Contrast  No fracture.   2102 Disccussed risks/benefits with patient and she prefers to hold anticoagulation for 3 days and follow-up with PCP. She has a safe social situation and will be monitored by family with return precuations if any changes.   2103 Findings were discussed with the patient including non-emergent imaging/lab results. Additional verbal instructions were discussed with the patient as well. Instructed to follow up with a primary care provider within 3 days. Also discussed specific warning signs and instructed to return to the ED if there are any concerns. Patient voiced understanding of instructions, questions were answered and the patient was discharged home in stable condition.       Procedures            Results for orders placed or performed during the hospital encounter of 01/20/22 (from the past 24 hour(s))   INR   Result Value Ref  Range    INR 1.30 (H) 0.85 - 1.15   Extra Tube    Narrative    The following orders were created for panel order Extra Tube.  Procedure                               Abnormality         Status                     ---------                               -----------         ------                     Extra Serum Separator Tu...[[id number]]                      Final result               Extra Green Top (Lithium...[[id number]]                      Final result               Extra Purple Top Tube[[id number]]                            Final result                 Please view results for these tests on the individual orders.   Extra Serum Separator Tube (SST)   Result Value Ref Range    Hold Specimen collected    Extra Green Top (Lithium Heparin) Tube   Result Value Ref Range    Hold Specimen JIC    Extra Purple Top Tube   Result Value Ref Range    Hold Specimen JIC    Head CT w/o contrast    Narrative    PROCEDURE: CT HEAD W/O CONTRAST     HISTORY: Head trauma, minor (Age >= 65y).    COMPARISON: None.    TECHNIQUE:  Helical images of the head from the foramen magnum to the  vertex were obtained without contrast.    FINDINGS: There is an old lacunar infarct seen in the anterior limb of  the internal capsule on the left. The ventricular system is normal in  size. There are no masses or ventricular shifts or extracerebral  collections. Cranial vault is intact. There is a probable sebaceous  cyst in the subcutaneous tissues posterior to the occiput.  The  visualized paranasal sinuses are clear.      Impression    IMPRESSION: No acute brain abnormality      BLAKE BECKFORD MD         SYSTEM ID:  [id number]   Cervical spine CT w/o contrast    Narrative    PROCEDURE: CT CERVICAL SPINE W/O CONTRAST 1/20/2022 8:42 PM    HISTORY: Neck trauma (Age >= 65y); Fall on ice, head injurya    COMPARISONS: 2018    Meds/Dose Given:    TECHNIQUE: CT scan of the cervical spine with sagittal and coronal  reconstructions    FINDINGS: Degenerative  changes are seen at the middle atlantoaxial  joint. There is calcification of the transverse ligament of the atlas.  There is decrease in height in the C3-C4, C4-C5, and C5-C6 discs.  Cervical facet joint degenerative changes are noted worse on the right  than the left. There are no fractures of the vertebral bodies or  arches. The prevertebral soft tissues are free of masses.  Atherosclerotic plaquing is seen at the carotid bifurcations.         Impression    IMPRESSION: Degenerative changes of the cervical spine no acute  fracture    BLAKE BECKFORD MD         SYSTEM ID:  [id number]   CT Facial Bones without Contrast    Narrative    PROCEDURE: CT FACIAL BONES WITHOUT CONTRAST 1/20/2022 8:42 PM    HISTORY: Facial trauma    COMPARISONS: None.    Meds/Dose Given:    TECHNIQUE: CT scan of the facial bones with sagittal and coronal  reconstructions    FINDINGS: The nasal bones are intact. The nasal septum is intact. The  turbinates are normal. The paranasal sinuses are clear. The bony  orbits are intact. Zygomatic arches appear normal. There is no  mandibular fracture. Severe degenerative changes are seen in the right  temporomandibular joint. The pterygoid plates appear normal.         Impression    IMPRESSION: No acute facial fracture.    BLAKE BECKFORD MD         SYSTEM ID:  [id number]       Medications - No data to display    Assessments & Plan (with Medical Decision Making)     I have reviewed the nursing notes.    Mechanical fall with head injury on Eliquis. No obvious neck pain, but given age, plan on CT as well. Mild stiffness with rotation. Declines C-collar. Otherwise stable. Plan on imaging and then discussion of risks/benefits for short cessation of anticoagulation. She states she is not on the anticoagulation for her valve replacement and that it is only related to A fib. I confirmed this with her several times.    See ED Course.    I have reviewed the findings, diagnosis, plan and need for follow up  with the patient.    New Prescriptions    No medications on file       Final diagnoses:   Fall, initial encounter   Injury of head, initial encounter   Anticoagulated   Abrasion of face, initial encounter       1/20/2022   [facility]     Jeyson Johnson MD  01/20/22 1271

## 2022-03-07 ENCOUNTER — APPOINTMENT (OUTPATIENT)
Dept: GENERAL RADIOLOGY | Facility: HOSPITAL | Age: 82
End: 2022-03-07
Attending: INTERNAL MEDICINE
Payer: MEDICARE

## 2022-03-07 ENCOUNTER — HOSPITAL ENCOUNTER (EMERGENCY)
Facility: HOSPITAL | Age: 82
Discharge: HOME | End: 2022-03-07
Attending: INTERNAL MEDICINE | Admitting: INTERNAL MEDICINE
Payer: MEDICARE

## 2022-03-07 VITALS
TEMPERATURE: 97 F | RESPIRATION RATE: 18 BRPM | DIASTOLIC BLOOD PRESSURE: 70 MMHG | HEART RATE: 92 BPM | SYSTOLIC BLOOD PRESSURE: 126 MMHG | OXYGEN SATURATION: 98 %

## 2022-03-07 DIAGNOSIS — U07.1 INFECTION DUE TO 2019 NOVEL CORONAVIRUS: ICD-10-CM

## 2022-03-07 LAB
FLUAV RNA SPEC QL NAA+PROBE: NEGATIVE
FLUBV RNA RESP QL NAA+PROBE: NEGATIVE
RSV RNA SPEC NAA+PROBE: NEGATIVE
SARS-COV-2 RNA RESP QL NAA+PROBE: POSITIVE

## 2022-03-07 PROCEDURE — C9803 HOPD COVID-19 SPEC COLLECT: HCPCS

## 2022-03-07 PROCEDURE — 70360 X-RAY EXAM OF NECK: CPT

## 2022-03-07 PROCEDURE — 99284 EMERGENCY DEPT VISIT MOD MDM: CPT | Performed by: INTERNAL MEDICINE

## 2022-03-07 PROCEDURE — 250N000009 HC RX 250: Performed by: INTERNAL MEDICINE

## 2022-03-07 PROCEDURE — 87637 SARSCOV2&INF A&B&RSV AMP PRB: CPT | Performed by: INTERNAL MEDICINE

## 2022-03-07 PROCEDURE — 71046 X-RAY EXAM CHEST 2 VIEWS: CPT

## 2022-03-07 PROCEDURE — 250N000013 HC RX MED GY IP 250 OP 250 PS 637: Performed by: INTERNAL MEDICINE

## 2022-03-07 PROCEDURE — 99284 EMERGENCY DEPT VISIT MOD MDM: CPT | Mod: 25

## 2022-03-07 RX ORDER — DEXAMETHASONE SODIUM PHOSPHATE 10 MG/ML
10 INJECTION INTRAMUSCULAR; INTRAVENOUS ONCE
Status: COMPLETED | OUTPATIENT
Start: 2022-03-07 | End: 2022-03-07

## 2022-03-07 RX ORDER — HYDROCHLOROTHIAZIDE 12.5 MG/1
12.5 TABLET ORAL DAILY
COMMUNITY
Start: 2021-10-07 | End: 2023-01-01

## 2022-03-07 RX ORDER — HYDROCODONE BITARTRATE AND ACETAMINOPHEN 5; 325 MG/1; MG/1
1 TABLET ORAL EVERY 6 HOURS PRN
Qty: 6 TABLET | Refills: 0 | Status: SHIPPED | OUTPATIENT
Start: 2022-03-07 | End: 2022-03-07

## 2022-03-07 RX ORDER — HYDROCODONE BITARTRATE AND ACETAMINOPHEN 5; 325 MG/1; MG/1
1 TABLET ORAL ONCE
Status: COMPLETED | OUTPATIENT
Start: 2022-03-07 | End: 2022-03-07

## 2022-03-07 RX ORDER — HYDROCODONE BITARTRATE AND ACETAMINOPHEN 5; 325 MG/1; MG/1
1 TABLET ORAL EVERY 6 HOURS PRN
Qty: 8 TABLET | Refills: 0 | Status: SHIPPED | OUTPATIENT
Start: 2022-03-07 | End: 2022-03-09

## 2022-03-07 RX ORDER — FERROUS SULFATE 325(65) MG
325 TABLET ORAL DAILY
COMMUNITY
Start: 2021-07-14

## 2022-03-07 RX ORDER — NADOLOL 80 MG/1
80 TABLET ORAL DAILY
COMMUNITY
Start: 2021-09-15 | End: 2023-01-01

## 2022-03-07 RX ADMIN — HYDROCODONE BITARTRATE AND ACETAMINOPHEN 1 TABLET: 5; 325 TABLET ORAL at 02:03

## 2022-03-07 RX ADMIN — DEXAMETHASONE SODIUM PHOSPHATE 10 MG: 10 INJECTION INTRAMUSCULAR; INTRAVENOUS at 00:29

## 2022-03-07 ASSESSMENT — ENCOUNTER SYMPTOMS
RHINORRHEA: 0
ABDOMINAL DISTENTION: 0
SHORTNESS OF BREATH: 0
SINUS PAIN: 0
TROUBLE SWALLOWING: 1
DIAPHORESIS: 0
DYSURIA: 0
FACIAL SWELLING: 0
CONFUSION: 0
HEMATURIA: 0
BLOOD IN STOOL: 0
MYALGIAS: 1
COUGH: 1
BACK PAIN: 0
VOMITING: 0
SORE THROAT: 1
FLANK PAIN: 0
LIGHT-HEADEDNESS: 0
NUMBNESS: 0
WOUND: 0
DIZZINESS: 0
FEVER: 0
CHEST TIGHTNESS: 0
VOICE CHANGE: 0
WHEEZING: 0
ARTHRALGIAS: 0
NECK STIFFNESS: 0
NAUSEA: 0
ANAL BLEEDING: 0
ABDOMINAL PAIN: 0
PALPITATIONS: 0
NECK PAIN: 0
HEADACHES: 0
COLOR CHANGE: 0
CHILLS: 0

## 2022-03-07 NOTE — DISCHARGE INSTRUCTIONS
Please register to to below website for evaluation of antibody treatment:  [email or web address]

## 2022-03-07 NOTE — ED TRIAGE NOTES
Ambulated into triage with c/o sore throat and generalized body aches for 2 days. Denies fever. States her throat feels so swollen and painful she can barely swallow. States she has been taking tylenol at home but it doesn't help the pain. Rates pain 9/10.

## 2022-03-07 NOTE — ED NOTES
Discharge instructions reviewed. Verbalized understanding. States throat pain is still a 9/10. Denies questions or concerns. Hydrocodone 5/325 tablet given to patient to take at home per direction to do so from Dr. Moncada due to patient did not bring purse and is unable to get medication form [facility]. Wheeled out of ED to private vehicle.

## 2022-03-07 NOTE — ED PROVIDER NOTES
History     Chief Complaint   Patient presents with     Generalized Body Aches     Pharyngitis     The history is provided by the patient.   Pharyngitis  Location:  Generalized  Quality:  Sore  Severity:  Moderate  Onset quality:  Gradual  Duration:  3 days  Timing:  Constant  Progression:  Worsening  Chronicity:  New  Associated symptoms: cough, ear pain and trouble swallowing    Associated symptoms: no abdominal pain, no chest pain, no chills, no drooling, no fever, no headaches, no neck stiffness, no rash, no rhinorrhea, no shortness of breath and no voice change          Allergies:  Allergies   Allergen Reactions     Bactrim [Sulfamethoxazole W/Trimethoprim] Other (See Comments)     Renal failure, hyperkalemia     Atorvastatin Other (See Comments)     muscle aches     Codeine GI Disturbance     Causes GI upset (scanned record).     Crestor [Rosuvastatin] Muscle Pain (Myalgia)     Doxycycline      nausea     Meticorten Other (See Comments)     Can't sleep     Penicillins      Distant history of a rash  Injection site reaction     Prednisone Other (See Comments)     insomnia     Simvastatin Muscle Pain (Myalgia)       Problem List:    Patient Active Problem List    Diagnosis Date Noted     Hyperkalemia 10/31/2018     Priority: Medium     Head injury 09/09/2018     Priority: Medium     Hypomagnesemia 09/09/2018     Priority: Medium     Iron deficiency anemia due to chronic blood loss 01/20/2018     Priority: Medium     Long term (current) use of opiate analgesic 11/21/2017     Priority: Medium     Atrial fibrillation, chronic (H) 06/12/2017     Priority: Medium     Tubular adenoma of colon 03/09/2014     Priority: Medium     Coronary artery disease with stable angina pectoris (H) 02/07/2014     Priority: Medium     S/p 2 vessel bypass       S/P aortic valve replacement 02/07/2014     Priority: Medium     Overview:   IMO Update 10/11  Problem list name updated by automated process. Provider to review       Lower  GI bleeding 02/07/2014     Priority: Medium     Gout 05/13/2010     Priority: Medium     Overview:   Updated per 10/1/17 IMO import       Status post coronary artery bypass graft 04/22/2009     Priority: Medium     Overview:   IMO Update 10/11       S/P thoracic aortic aneurysm repair 04/22/2009     Priority: Medium     Overview:   IMO Update 10/11       Impaired fasting glucose 12/18/2008     Priority: Medium     Essential hypertension 08/25/2006     Priority: Medium     Overview:   IMO Update       Hyperlipidemia 08/25/2006     Priority: Medium     Overview:   IMO Update 10/11       Fibromyalgia 10/20/2005     Priority: Medium     Overview:           Past Medical History:    Past Medical History:   Diagnosis Date     Atrial fibrillation, chronic 6/12/2017     Coronary artery disease with stable angina pectoris (H) 2/7/2014     Essential hypertension 8/25/2006     Fibromyalgia 10/20/2005     Gout 5/13/2010     Hyperlipidemia 8/25/2006     Hypertension 5/10/2004     Impaired fasting glucose 12/18/2008     Iron deficiency anemia due to chronic blood loss 1/20/2018     Long term (current) use of opiate analgesic 11/21/2017     S/P aortic valve replacement 2/7/2014     S/P thoracic aortic aneurysm repair 4/22/2009     Status post coronary artery bypass graft 4/22/2009     Tubular adenoma of colon 3/9/2014       Past Surgical History:    Past Surgical History:   Procedure Laterality Date     AORTIC VALVE REPLACEMENT  05/2008    Mitroflow [employer] bioprosthesis     APPENDECTOMY       C OB/GYN PROCEDURE                          DATE:  1986    Vaginal granulation tissue removed     CARDIAC CATHERIZATION  01/04/2016     COLONOSCOPY      and polypectomy     HISTORY OF CABG  05/2008    Single vessel     HISTORY OF CATARACT REPAIR Right 05/09/2011     HISTORY OF THORACIC AORTIC ANEURYSM REPAIR  05/2008    Ascending aorta, tube graft     SIOBHAN/BSO  1984    Benign fibroid     TONSILLECTOMY & ADENOIDECTOMY  1949       Family  History:    Family History   Problem Relation Age of Onset     Diabetes Mother      Myocardial Infarction Father         50s     Kidney Cancer Brother      Diabetes Son        Social History:  Marital Status:   [5]  Social History     Tobacco Use     Smoking status: Never Smoker     Smokeless tobacco: Never Used   Substance Use Topics     Alcohol use: No     Drug use: No        Medications:    allopurinol (ZYLOPRIM) 100 MG tablet  amLODIPine (NORVASC) 10 MG tablet  apixaban ANTICOAGULANT (ELIQUIS) 5 MG tablet  ferrous sulfate (FEROSUL) 325 (65 Fe) MG tablet  hydrochlorothiazide (HYDRODIURIL) 12.5 MG tablet  HYDROcodone-acetaminophen (NORCO) 5-325 MG tablet  HYDROcodone-acetaminophen (NORCO) 5-325 MG tablet  isosorbide mononitrate (IMDUR) 30 MG 24 hr tablet  Magnesium 400 MG TABS  nadolol (CORGARD) 80 MG tablet  nitroGLYcerin (NITROSTAT) 0.4 MG sublingual tablet  Omega-3 Fatty Acids (OMEGA-3 CF) 1000 MG CAPS  omeprazole (PRILOSEC) 20 MG CR capsule  pravastatin (PRAVACHOL) 20 MG tablet  traMADol (ULTRAM) 50 MG tablet  magnesium oxide (MAG-OX) 400 (241.3 Mg) MG tablet  nadolol (CORGARD) 80 MG tablet          Review of Systems   Constitutional: Negative for chills, diaphoresis and fever.   HENT: Positive for ear pain, sore throat and trouble swallowing. Negative for congestion, drooling, facial swelling, rhinorrhea, sinus pain and voice change.    Eyes: Negative for visual disturbance.   Respiratory: Positive for cough. Negative for chest tightness, shortness of breath and wheezing.    Cardiovascular: Negative for chest pain, palpitations and leg swelling.   Gastrointestinal: Negative for abdominal distention, abdominal pain, anal bleeding, blood in stool, nausea and vomiting.   Genitourinary: Negative for decreased urine volume, dysuria, flank pain and hematuria.   Musculoskeletal: Positive for myalgias. Negative for arthralgias, back pain, gait problem, neck pain and neck stiffness.   Skin: Negative for color  change, pallor, rash and wound.   Neurological: Negative for dizziness, syncope, light-headedness, numbness and headaches.   Psychiatric/Behavioral: Negative for confusion and suicidal ideas.   All other systems reviewed and are negative.      Physical Exam   BP: 126/70  Pulse: 92  Temp: 97  F (36.1  C)  Resp: 18  SpO2: 98 %      Physical Exam  Vitals and nursing note reviewed.   Constitutional:       Appearance: She is well-developed.   HENT:      Head: Normocephalic and atraumatic.      Right Ear: Tympanic membrane and ear canal normal. No drainage, swelling or tenderness.      Left Ear: Tympanic membrane and ear canal normal. No drainage, swelling or tenderness.      Mouth/Throat:      Mouth: Mucous membranes are moist.      Pharynx: Oropharynx is clear. Uvula midline. No pharyngeal swelling, oropharyngeal exudate or uvula swelling.   Eyes:      Conjunctiva/sclera: Conjunctivae normal.      Pupils: Pupils are equal, round, and reactive to light.   Neck:      Thyroid: No thyromegaly.      Vascular: No JVD.      Trachea: No tracheal deviation.   Cardiovascular:      Rate and Rhythm: Normal rate and regular rhythm.      Heart sounds: Normal heart sounds. No murmur heard.    No friction rub. No gallop.   Pulmonary:      Effort: Pulmonary effort is normal. No respiratory distress.      Breath sounds: Normal breath sounds. No stridor. No wheezing or rales.   Chest:      Chest wall: No tenderness.   Abdominal:      General: Bowel sounds are normal. There is no distension.      Palpations: Abdomen is soft. There is no mass.      Tenderness: There is no abdominal tenderness. There is no guarding or rebound.   Musculoskeletal:         General: No tenderness. Normal range of motion.      Cervical back: Normal range of motion and neck supple.   Lymphadenopathy:      Cervical: No cervical adenopathy.   Skin:     General: Skin is warm and dry.      Coloration: Skin is not pale.      Findings: No erythema or rash.    Neurological:      Mental Status: She is alert and oriented to person, place, and time.   Psychiatric:         Behavior: Behavior normal.         ED Course                 Procedures                No results found for this or any previous visit (from the past 24 hour(s)).    Medications   dexamethasone (DECADRON) injectable solution used ORALLY 10 mg (10 mg Oral Given 3/7/22 0029)   HYDROcodone-acetaminophen (NORCO) 5-325 MG per tablet 1 tablet (1 tablet Oral Given 3/7/22 0203)       Assessments & Plan (with Medical Decision Making)   Sore throat , body ache since Friday night  Difficulty swallowing because of pain  Pt drank water in ER , was painful when swallowing, other than no problem, no spitting up, no drooling  Xray neck and CXR: no acute finding  COVID test came positive, pt is fully vaccinated  I advised oral hydration at home, follow direction for evaluation if she qualify for Monoclonal antibody or antivirus treatment , if developed SOB, or felt very weak return to ER, she understood and agreed   I have reviewed the nursing notes.    I have reviewed the findings, diagnosis, plan and need for follow up with the patient.      Discharge Medication List as of 3/7/2022  2:00 AM      START taking these medications    Details   !! HYDROcodone-acetaminophen (NORCO) 5-325 MG tablet Take 1 tablet by mouth every 6 hours as needed for severe pain, Disp-8 tablet, R-0, E-Prescribe       !! - Potential duplicate medications found. Please discuss with provider.          Final diagnoses:   Infection due to 2019 novel coronavirus       3/6/2022   [facility]     Ashwin Moncada MD  03/08/22 [id number]

## 2022-03-07 NOTE — ED NOTES
Patient states she did not bring her purse or wallet with her and prefers to have prescription sent to [facility] so she can have a family member pick it up for her tomorrow. Dr. Moncada informed.

## 2022-11-01 ENCOUNTER — APPOINTMENT (OUTPATIENT)
Dept: GENERAL RADIOLOGY | Facility: HOSPITAL | Age: 82
End: 2022-11-01
Attending: STUDENT IN AN ORGANIZED HEALTH CARE EDUCATION/TRAINING PROGRAM
Payer: MEDICARE

## 2022-11-01 ENCOUNTER — HOSPITAL ENCOUNTER (EMERGENCY)
Facility: HOSPITAL | Age: 82
Discharge: HOME | End: 2022-11-01
Attending: STUDENT IN AN ORGANIZED HEALTH CARE EDUCATION/TRAINING PROGRAM | Admitting: STUDENT IN AN ORGANIZED HEALTH CARE EDUCATION/TRAINING PROGRAM
Payer: MEDICARE

## 2022-11-01 VITALS
RESPIRATION RATE: 18 BRPM | WEIGHT: 125 LBS | HEIGHT: 60 IN | HEART RATE: 71 BPM | DIASTOLIC BLOOD PRESSURE: 98 MMHG | OXYGEN SATURATION: 94 % | BODY MASS INDEX: 24.54 KG/M2 | SYSTOLIC BLOOD PRESSURE: 157 MMHG | TEMPERATURE: 97.9 F

## 2022-11-01 DIAGNOSIS — J18.9 PNEUMONIA OF LEFT LUNG DUE TO INFECTIOUS ORGANISM, UNSPECIFIED PART OF LUNG: ICD-10-CM

## 2022-11-01 LAB
ALBUMIN UR-MCNC: 10 MG/DL
ANION GAP SERPL CALCULATED.3IONS-SCNC: 9 MMOL/L (ref 7–15)
APPEARANCE UR: CLEAR
BASOPHILS # BLD AUTO: 0.1 10E3/UL (ref 0–0.2)
BASOPHILS NFR BLD AUTO: 1 %
BILIRUB UR QL STRIP: NEGATIVE
BUN SERPL-MCNC: 28.8 MG/DL (ref 8–23)
CALCIUM SERPL-MCNC: 9.6 MG/DL (ref 8.8–10.2)
CHLORIDE SERPL-SCNC: 105 MMOL/L (ref 98–107)
COLOR UR AUTO: ABNORMAL
CREAT SERPL-MCNC: 1.09 MG/DL (ref 0.51–0.95)
DEPRECATED HCO3 PLAS-SCNC: 26 MMOL/L (ref 22–29)
EOSINOPHIL # BLD AUTO: 0.4 10E3/UL (ref 0–0.7)
EOSINOPHIL NFR BLD AUTO: 4 %
ERYTHROCYTE [DISTWIDTH] IN BLOOD BY AUTOMATED COUNT: 14 % (ref 10–15)
FLUAV RNA SPEC QL NAA+PROBE: NEGATIVE
FLUBV RNA RESP QL NAA+PROBE: NEGATIVE
GFR SERPL CREATININE-BSD FRML MDRD: 51 ML/MIN/1.73M2
GLUCOSE SERPL-MCNC: 124 MG/DL (ref 70–99)
GLUCOSE UR STRIP-MCNC: NEGATIVE MG/DL
HCT VFR BLD AUTO: 33.7 % (ref 35–47)
HGB BLD-MCNC: 10.7 G/DL (ref 11.7–15.7)
HGB UR QL STRIP: NEGATIVE
HOLD SPECIMEN: NORMAL
IMM GRANULOCYTES # BLD: 0 10E3/UL
IMM GRANULOCYTES NFR BLD: 0 %
KETONES UR STRIP-MCNC: NEGATIVE MG/DL
LEUKOCYTE ESTERASE UR QL STRIP: NEGATIVE
LYMPHOCYTES # BLD AUTO: 2.5 10E3/UL (ref 0.8–5.3)
LYMPHOCYTES NFR BLD AUTO: 30 %
MCH RBC QN AUTO: 28.6 PG (ref 26.5–33)
MCHC RBC AUTO-ENTMCNC: 31.8 G/DL (ref 31.5–36.5)
MCV RBC AUTO: 90 FL (ref 78–100)
MONOCYTES # BLD AUTO: 0.7 10E3/UL (ref 0–1.3)
MONOCYTES NFR BLD AUTO: 9 %
NEUTROPHILS # BLD AUTO: 4.8 10E3/UL (ref 1.6–8.3)
NEUTROPHILS NFR BLD AUTO: 56 %
NITRATE UR QL: NEGATIVE
NRBC # BLD AUTO: 0 10E3/UL
NRBC BLD AUTO-RTO: 0 /100
PH UR STRIP: 6.5 [PH] (ref 4.7–8)
PLATELET # BLD AUTO: 208 10E3/UL (ref 150–450)
POTASSIUM SERPL-SCNC: 4.5 MMOL/L (ref 3.4–5.3)
PROCALCITONIN SERPL IA-MCNC: 0.06 NG/ML
RBC # BLD AUTO: 3.74 10E6/UL (ref 3.8–5.2)
RBC URINE: <1 /HPF
RSV RNA SPEC NAA+PROBE: NEGATIVE
SARS-COV-2 RNA RESP QL NAA+PROBE: NEGATIVE
SODIUM SERPL-SCNC: 140 MMOL/L (ref 136–145)
SP GR UR STRIP: 1.01 (ref 1–1.03)
SQUAMOUS EPITHELIAL: 1 /HPF
UROBILINOGEN UR STRIP-MCNC: NORMAL MG/DL
WBC # BLD AUTO: 8.4 10E3/UL (ref 4–11)
WBC URINE: <1 /HPF

## 2022-11-01 PROCEDURE — 99284 EMERGENCY DEPT VISIT MOD MDM: CPT | Mod: CS | Performed by: STUDENT IN AN ORGANIZED HEALTH CARE EDUCATION/TRAINING PROGRAM

## 2022-11-01 PROCEDURE — 85025 COMPLETE CBC W/AUTO DIFF WBC: CPT | Performed by: STUDENT IN AN ORGANIZED HEALTH CARE EDUCATION/TRAINING PROGRAM

## 2022-11-01 PROCEDURE — C9803 HOPD COVID-19 SPEC COLLECT: HCPCS

## 2022-11-01 PROCEDURE — 93005 ELECTROCARDIOGRAM TRACING: CPT

## 2022-11-01 PROCEDURE — 80048 BASIC METABOLIC PNL TOTAL CA: CPT | Performed by: STUDENT IN AN ORGANIZED HEALTH CARE EDUCATION/TRAINING PROGRAM

## 2022-11-01 PROCEDURE — 84145 PROCALCITONIN (PCT): CPT | Performed by: STUDENT IN AN ORGANIZED HEALTH CARE EDUCATION/TRAINING PROGRAM

## 2022-11-01 PROCEDURE — 99285 EMERGENCY DEPT VISIT HI MDM: CPT | Mod: 25,CS

## 2022-11-01 PROCEDURE — 36415 COLL VENOUS BLD VENIPUNCTURE: CPT | Performed by: STUDENT IN AN ORGANIZED HEALTH CARE EDUCATION/TRAINING PROGRAM

## 2022-11-01 PROCEDURE — 71046 X-RAY EXAM CHEST 2 VIEWS: CPT

## 2022-11-01 PROCEDURE — 81001 URINALYSIS AUTO W/SCOPE: CPT | Performed by: STUDENT IN AN ORGANIZED HEALTH CARE EDUCATION/TRAINING PROGRAM

## 2022-11-01 PROCEDURE — 87637 SARSCOV2&INF A&B&RSV AMP PRB: CPT | Performed by: STUDENT IN AN ORGANIZED HEALTH CARE EDUCATION/TRAINING PROGRAM

## 2022-11-01 RX ORDER — CEFDINIR 300 MG/1
300 CAPSULE ORAL 2 TIMES DAILY
Qty: 10 CAPSULE | Refills: 0 | Status: SHIPPED | OUTPATIENT
Start: 2022-11-01 | End: 2022-11-06

## 2022-11-01 RX ORDER — CEFDINIR 300 MG/1
300 CAPSULE ORAL 2 TIMES DAILY
Qty: 14 CAPSULE | Refills: 0 | Status: SHIPPED | OUTPATIENT
Start: 2022-11-01 | End: 2022-11-01

## 2022-11-01 RX ORDER — AZITHROMYCIN 250 MG/1
TABLET, FILM COATED ORAL
Qty: 6 TABLET | Refills: 0 | Status: SHIPPED | OUTPATIENT
Start: 2022-11-01 | End: 2022-11-06

## 2022-11-01 ASSESSMENT — ACTIVITIES OF DAILY LIVING (ADL)
ADLS_ACUITY_SCORE: 37

## 2022-11-01 NOTE — ED NOTES
Went over discharge and patient stating she's unable to get into her primary for over a month letting Care coordinator take a look to see if we could get her in a bit sooner

## 2022-11-01 NOTE — ED PROVIDER NOTES
History     Chief Complaint   Patient presents with     Generalized Weakness     Cough     HPI  Katya Alcala is a 82 year old female with the below past medical history presents to the emergency department today complaining of cough and feeling under the weather.  Symptoms been going on for 3 days.  She feels a little tired and when she coughs it hurts in her lower chest.  She is not producing sputum, she does not think she has been having fevers.  She has no other complaints at this time.  No chest pain except for what is described above.  No shortness of breath no abdominal pain nausea vomiting or diarrhea.    Allergies:  Allergies   Allergen Reactions     Bactrim [Sulfamethoxazole W/Trimethoprim] Other (See Comments)     Renal failure, hyperkalemia     Atorvastatin Other (See Comments)     muscle aches     Codeine GI Disturbance     Causes GI upset (scanned record).     Crestor [Rosuvastatin] Muscle Pain (Myalgia)     Doxycycline      nausea     Meticorten Other (See Comments)     Can't sleep     Penicillins      Distant history of a rash  Injection site reaction     Prednisone Other (See Comments)     insomnia     Simvastatin Muscle Pain (Myalgia)       Problem List:    Patient Active Problem List    Diagnosis Date Noted     Hyperkalemia 10/31/2018     Priority: Medium     Head injury 09/09/2018     Priority: Medium     Hypomagnesemia 09/09/2018     Priority: Medium     Iron deficiency anemia due to chronic blood loss 01/20/2018     Priority: Medium     Long term (current) use of opiate analgesic 11/21/2017     Priority: Medium     Atrial fibrillation, chronic (H) 06/12/2017     Priority: Medium     Tubular adenoma of colon 03/09/2014     Priority: Medium     Coronary artery disease with stable angina pectoris (H) 02/07/2014     Priority: Medium     S/p 2 vessel bypass       S/P aortic valve replacement 02/07/2014     Priority: Medium     Overview:   IMO Update 10/11  Problem list name updated by automated  process. Provider to review       Lower GI bleeding 02/07/2014     Priority: Medium     Gout 05/13/2010     Priority: Medium     Overview:   Updated per 10/1/17 IMO import       Status post coronary artery bypass graft 04/22/2009     Priority: Medium     Overview:   IMO Update 10/11       S/P thoracic aortic aneurysm repair 04/22/2009     Priority: Medium     Overview:   IMO Update 10/11       Impaired fasting glucose 12/18/2008     Priority: Medium     Essential hypertension 08/25/2006     Priority: Medium     Overview:   IMO Update       Hyperlipidemia 08/25/2006     Priority: Medium     Overview:   IMO Update 10/11       Fibromyalgia 10/20/2005     Priority: Medium     Overview:           Past Medical History:    Past Medical History:   Diagnosis Date     Atrial fibrillation, chronic 6/12/2017     Coronary artery disease with stable angina pectoris (H) 2/7/2014     Essential hypertension 8/25/2006     Fibromyalgia 10/20/2005     Gout 5/13/2010     Hyperlipidemia 8/25/2006     Hypertension 5/10/2004     Impaired fasting glucose 12/18/2008     Iron deficiency anemia due to chronic blood loss 1/20/2018     Long term (current) use of opiate analgesic 11/21/2017     S/P aortic valve replacement 2/7/2014     S/P thoracic aortic aneurysm repair 4/22/2009     Status post coronary artery bypass graft 4/22/2009     Tubular adenoma of colon 3/9/2014       Past Surgical History:    Past Surgical History:   Procedure Laterality Date     AORTIC VALVE REPLACEMENT  05/2008    Mitroflow [employer] bioprosthesis     APPENDECTOMY       C OB/GYN PROCEDURE                          DATE:  1986    Vaginal granulation tissue removed     CARDIAC CATHERIZATION  01/04/2016     COLONOSCOPY      and polypectomy     HISTORY OF CABG  05/2008    Single vessel     HISTORY OF CATARACT REPAIR Right 05/09/2011     HISTORY OF THORACIC AORTIC ANEURYSM REPAIR  05/2008    Ascending aorta, tube graft     SIOBHAN/BSO  1984    Benign fibroid      TONSILLECTOMY & ADENOIDECTOMY  1949       Family History:    Family History   Problem Relation Age of Onset     Diabetes Mother      Myocardial Infarction Father         50s     Kidney Cancer Brother      Diabetes Son        Social History:  Marital Status:   [5]  Social History     Tobacco Use     Smoking status: Never     Smokeless tobacco: Never   Substance Use Topics     Alcohol use: No     Drug use: No        Medications:    azithromycin (ZITHROMAX) 250 MG tablet  cefdinir (OMNICEF) 300 MG capsule  allopurinol (ZYLOPRIM) 100 MG tablet  amLODIPine (NORVASC) 10 MG tablet  apixaban ANTICOAGULANT (ELIQUIS) 5 MG tablet  ferrous sulfate (FEROSUL) 325 (65 Fe) MG tablet  hydrochlorothiazide (HYDRODIURIL) 12.5 MG tablet  HYDROcodone-acetaminophen (NORCO) 5-325 MG tablet  isosorbide mononitrate (IMDUR) 30 MG 24 hr tablet  Magnesium 400 MG TABS  magnesium oxide (MAG-OX) 400 (241.3 Mg) MG tablet  nadolol (CORGARD) 80 MG tablet  nadolol (CORGARD) 80 MG tablet  nitroGLYcerin (NITROSTAT) 0.4 MG sublingual tablet  Omega-3 Fatty Acids (OMEGA-3 CF) 1000 MG CAPS  omeprazole (PRILOSEC) 20 MG CR capsule  pravastatin (PRAVACHOL) 20 MG tablet  traMADol (ULTRAM) 50 MG tablet          Review of Systems  A complete review of systems was performed and is otherwise negative.     Physical Exam   BP: 146/74  Pulse: 80  Temp: 97.9  F (36.6  C)  Resp: 16  Height: 152.4 cm (5')  Weight: 56.7 kg (125 lb)  SpO2: 97 %      Physical Exam  Constitutional: Alert and conversant. NAD   HENT: NCAT   Eyes: Normal pupils   Neck: supple   CV: Normal rate, regular rhythm, no murmur   Pulmonary/Chest: Non-labored respirations, clear to auscultation bilaterally   Abdominal: Soft, non-tender, non-distended   MSK: BERNAL.   Neuro: Alert and appropriate   Skin: Warm and dry. No diaphoresis. No rashes on exposed skin    Psych: Appropriate mood and affect     ED Course              ED Course as of 11/01/22 1121   Tue Nov 01, 2022   1032 Differential includes  but is not limited to PNA, CHF, COPD exacerbation, pulmonary edema, pulmonary fibrosis, rhinorrhea, strep pharyngitis, viral URI, Pertussis, bronchitis, GERD/LPreflux, environmental irritant, foreign body      1032 EKG with signs of atrial fibrillation, no RVR, she has known A. fib, no signs of acute ischemia or arrhythmia.  QRS is a little bit wide at 112, nonspecific widening, no evidence of right bundle more left if anything.   1058 XR Chest 2 Views  Minimal patchy opacity in the left lung base is likely atelectasis, however aspiration or pneumonia are possible in the appropriate clinical setting.     1105 Procalcitonin(!): 0.06  Indeterminate range for procalcitonin.  Discussed these findings with the patient, the presence of the cough the patchy opacity in the left lung base with pleurodynia, and the indeterminate procalcitonin.  Discussed risks and benefits of antibiotic treatment versus a watch and see approach.  Patient would like to proceed with antibiotics and this is not unreasonable.  Her vitals are reassuring her oxygen looks good.  We will proceed with antibiotics and primary care follow-up within the next week was recommended.  Patient discharged in stable edition all question answered return precautions given   1119 Patient does have a urine pending, however, since he is opting to treat with antibiotics will certainly cover common urinary microbes as well, patient is appropriate for further outpatient management discharged in stable condition all questions and return precautions given     Procedures            Results for orders placed or performed during the hospital encounter of 11/01/22 (from the past 24 hour(s))   Symptomatic; Yes; 10/30/2022 Influenza A/B & SARS-CoV2 (COVID-19) Virus PCR Multiplex Nasopharyngeal    Specimen: Nasopharyngeal; Swab   Result Value Ref Range    Influenza A PCR Negative Negative    Influenza B PCR Negative Negative    RSV PCR Negative Negative    SARS CoV2 PCR  Negative Negative    Narrative    Testing was performed using the Xpert Xpress CoV2/Flu/RSV Assay on the [employer] GeneXpert Instrument. This test should be ordered for the detection of SARS-CoV-2 and influenza viruses in individuals who meet clinical and/or epidemiological criteria. Test performance is unknown in asymptomatic patients. This test is for in vitro diagnostic use under the FDA EUA for laboratories certified under CLIA to perform high or moderate complexity testing. This test has not been FDA cleared or approved. A negative result does not rule out the presence of PCR inhibitors in the specimen or target RNA in concentration below the limit of detection for the assay. If only one viral target is positive but coinfection with multiple targets is suspected, the sample should be re-tested with another FDA cleared, approved, or authorized test, if coinfection would change clinical management. This test was validated by the [facility]. These laboratories are certified under the Clinical Laboratory Improvement Amendments of 1988 (CLIA-88) as qualified to perform high complexity laboratory testing.   CBC with platelets differential    Narrative    The following orders were created for panel order CBC with platelets differential.  Procedure                               Abnormality         Status                     ---------                               -----------         ------                     CBC with platelets and d...[[id number]]  Abnormal            Final result                 Please view results for these tests on the individual orders.   Basic metabolic panel   Result Value Ref Range    Sodium 140 136 - 145 mmol/L    Potassium 4.5 3.4 - 5.3 mmol/L    Chloride 105 98 - 107 mmol/L    Carbon Dioxide (CO2) 26 22 - 29 mmol/L    Anion Gap 9 7 - 15 mmol/L    Urea Nitrogen 28.8 (H) 8.0 - 23.0 mg/dL    Creatinine 1.09 (H) 0.51 - 0.95 mg/dL    Calcium 9.6 8.8 - 10.2 mg/dL    Glucose 124  (H) 70 - 99 mg/dL    GFR Estimate 51 (L) >60 mL/min/1.73m2   Procalcitonin   Result Value Ref Range    Procalcitonin 0.06 (H) <0.05 ng/mL   CBC with platelets and differential   Result Value Ref Range    WBC Count 8.4 4.0 - 11.0 10e3/uL    RBC Count 3.74 (L) 3.80 - 5.20 10e6/uL    Hemoglobin 10.7 (L) 11.7 - 15.7 g/dL    Hematocrit 33.7 (L) 35.0 - 47.0 %    MCV 90 78 - 100 fL    MCH 28.6 26.5 - 33.0 pg    MCHC 31.8 31.5 - 36.5 g/dL    RDW 14.0 10.0 - 15.0 %    Platelet Count 208 150 - 450 10e3/uL    % Neutrophils 56 %    % Lymphocytes 30 %    % Monocytes 9 %    % Eosinophils 4 %    % Basophils 1 %    % Immature Granulocytes 0 %    NRBCs per 100 WBC 0 <1 /100    Absolute Neutrophils 4.8 1.6 - 8.3 10e3/uL    Absolute Lymphocytes 2.5 0.8 - 5.3 10e3/uL    Absolute Monocytes 0.7 0.0 - 1.3 10e3/uL    Absolute Eosinophils 0.4 0.0 - 0.7 10e3/uL    Absolute Basophils 0.1 0.0 - 0.2 10e3/uL    Absolute Immature Granulocytes 0.0 <=0.4 10e3/uL    Absolute NRBCs 0.0 10e3/uL   Extra Tube    Narrative    The following orders were created for panel order Extra Tube.  Procedure                               Abnormality         Status                     ---------                               -----------         ------                     Extra Blue Top Tube[[id number]]                              In process                 Extra Red Top Tube[[id number]]                               In process                 Extra Heparinized Syringe[[id number]]                        In process                   Please view results for these tests on the individual orders.   XR Chest 2 Views    Narrative    Exam:  XR CHEST 2 VIEWS    HISTORY: cough PNA?.    COMPARISON:  3/7/2022, 9/9/2018    FINDINGS:     The cardiomediastinal contours are normal.      There is minimal patchy opacity in the left lung base. No pleural  effusion or pneumothorax.      No acute osseous abnormality. Prior median sternotomy.      Impression    IMPRESSION:      Minimal  patchy opacity in the left lung base is likely atelectasis,  however aspiration or pneumonia are possible in the appropriate  clinical setting.      BRANDON CLARK MD         SYSTEM ID:  [id number]       Medications - No data to display    Assessments & Plan (with Medical Decision Making)     I have reviewed the nursing notes.    I have reviewed the findings, diagnosis, plan and need for follow up with the patient.      New Prescriptions    AZITHROMYCIN (ZITHROMAX) 250 MG TABLET    Take 2 tablets (500 mg) by mouth daily for 1 day, THEN 1 tablet (250 mg) daily for 4 days.    CEFDINIR (OMNICEF) 300 MG CAPSULE    Take 1 capsule (300 mg) by mouth 2 times daily for 5 days       Final diagnoses:   Pneumonia of left lung due to infectious organism, unspecified part of lung       11/1/2022   [facility]     Marlon Griffin MD  11/01/22 112

## 2022-11-01 NOTE — ED NOTES
Care Transitions focused note:      Call to [facility] to get patient a follow up appointment.  No appointments available so they will reach out to provider (Dr Taylor) and call patient directly with an appointment time.    No further concerns.  Patient has been discharged from the emergency department.    FREDY Lujan

## 2022-11-01 NOTE — DISCHARGE INSTRUCTIONS
Return to the emergency department for worsening symptoms or new concerning symptoms.  Take the antibiotics as prescribed.  To finish the entire regimen.  Follow-up with your primary care provider within the next week.  Call schedule appointment

## 2022-11-01 NOTE — ED NOTES
Discharge instructions reviewed with patient.  Rx has been e-scribed to pharmacy of choice. Pt has no questions or concerns.  We have have giovanni YUN reach out to see if they can get pt an appointment as pt is having trouble getting one.  encouraged to return with new or worsening symptoms.  Copy of AVS in hand while pt ambulated out of the ED

## 2022-11-01 NOTE — ED TRIAGE NOTES
Reports not feeling well for about 3 days.  Coughing a lot and now ribs .  Reports feeling weak, tired and achy.

## 2023-01-01 ENCOUNTER — TELEPHONE (OUTPATIENT)
Dept: OTOLARYNGOLOGY | Facility: OTHER | Age: 83
End: 2023-01-01

## 2023-01-01 ENCOUNTER — MEDICAL CORRESPONDENCE (OUTPATIENT)
Dept: MRI IMAGING | Facility: HOSPITAL | Age: 83
End: 2023-01-01

## 2023-01-01 ENCOUNTER — TELEPHONE (OUTPATIENT)
Dept: INTERVENTIONAL RADIOLOGY/VASCULAR | Facility: HOSPITAL | Age: 83
End: 2023-01-01

## 2023-01-01 ENCOUNTER — APPOINTMENT (OUTPATIENT)
Dept: CT IMAGING | Facility: HOSPITAL | Age: 83
End: 2023-01-01
Attending: NURSE PRACTITIONER
Payer: MEDICARE

## 2023-01-01 ENCOUNTER — OFFICE VISIT (OUTPATIENT)
Dept: PODIATRY | Facility: OTHER | Age: 83
End: 2023-01-01
Attending: PODIATRIST
Payer: MEDICARE

## 2023-01-01 ENCOUNTER — HOSPITAL ENCOUNTER (OUTPATIENT)
Facility: HOSPITAL | Age: 83
Discharge: HOME | End: 2023-07-19
Attending: RADIOLOGY | Admitting: RADIOLOGY
Payer: MEDICARE

## 2023-01-01 ENCOUNTER — HOSPITAL ENCOUNTER (EMERGENCY)
Facility: HOSPITAL | Age: 83
Discharge: ANOTHER HOSPITAL | End: 2023-11-08
Attending: NURSE PRACTITIONER | Admitting: NURSE PRACTITIONER
Payer: MEDICARE

## 2023-01-01 ENCOUNTER — HOSPITAL ENCOUNTER (OUTPATIENT)
Dept: MRI IMAGING | Facility: HOSPITAL | Age: 83
Discharge: HOME | End: 2023-06-12
Attending: FAMILY MEDICINE | Admitting: FAMILY MEDICINE
Payer: MEDICARE

## 2023-01-01 ENCOUNTER — OFFICE VISIT (OUTPATIENT)
Dept: OTOLARYNGOLOGY | Facility: OTHER | Age: 83
End: 2023-01-01
Attending: OTOLARYNGOLOGY
Payer: COMMERCIAL

## 2023-01-01 ENCOUNTER — TELEPHONE (OUTPATIENT)
Dept: PODIATRY | Facility: OTHER | Age: 83
End: 2023-01-01

## 2023-01-01 ENCOUNTER — HOSPITAL ENCOUNTER (OUTPATIENT)
Facility: HOSPITAL | Age: 83
Discharge: HOME | End: 2023-07-28
Attending: RADIOLOGY | Admitting: RADIOLOGY
Payer: MEDICARE

## 2023-01-01 ENCOUNTER — HOSPITAL ENCOUNTER (OUTPATIENT)
Dept: ULTRASOUND IMAGING | Facility: HOSPITAL | Age: 83
Discharge: HOME | End: 2023-07-19
Attending: OTOLARYNGOLOGY | Admitting: RADIOLOGY
Payer: MEDICARE

## 2023-01-01 ENCOUNTER — TRANSFERRED RECORDS (OUTPATIENT)
Dept: HEALTH INFORMATION MANAGEMENT | Facility: HOSPITAL | Age: 83
End: 2023-01-01

## 2023-01-01 ENCOUNTER — HOSPITAL ENCOUNTER (OUTPATIENT)
Dept: ULTRASOUND IMAGING | Facility: HOSPITAL | Age: 83
Discharge: HOME | End: 2023-07-28
Attending: OTOLARYNGOLOGY | Admitting: RADIOLOGY
Payer: MEDICARE

## 2023-01-01 VITALS
SYSTOLIC BLOOD PRESSURE: 120 MMHG | BODY MASS INDEX: 24.94 KG/M2 | DIASTOLIC BLOOD PRESSURE: 50 MMHG | TEMPERATURE: 97.8 F | WEIGHT: 127 LBS | HEIGHT: 60 IN | HEART RATE: 56 BPM | OXYGEN SATURATION: 97 % | RESPIRATION RATE: 18 BRPM

## 2023-01-01 VITALS — OXYGEN SATURATION: 95 % | HEART RATE: 78 BPM | SYSTOLIC BLOOD PRESSURE: 148 MMHG | DIASTOLIC BLOOD PRESSURE: 71 MMHG

## 2023-01-01 VITALS
DIASTOLIC BLOOD PRESSURE: 75 MMHG | OXYGEN SATURATION: 96 % | HEART RATE: 76 BPM | SYSTOLIC BLOOD PRESSURE: 144 MMHG | TEMPERATURE: 97.4 F

## 2023-01-01 VITALS
BODY MASS INDEX: 24.35 KG/M2 | HEIGHT: 60 IN | HEART RATE: 92 BPM | OXYGEN SATURATION: 97 % | DIASTOLIC BLOOD PRESSURE: 90 MMHG | SYSTOLIC BLOOD PRESSURE: 139 MMHG | WEIGHT: 124 LBS | RESPIRATION RATE: 14 BRPM | TEMPERATURE: 99.5 F

## 2023-01-01 VITALS
OXYGEN SATURATION: 99 % | SYSTOLIC BLOOD PRESSURE: 158 MMHG | TEMPERATURE: 97.8 F | HEART RATE: 74 BPM | DIASTOLIC BLOOD PRESSURE: 75 MMHG

## 2023-01-01 VITALS — OXYGEN SATURATION: 96 % | HEART RATE: 75 BPM | SYSTOLIC BLOOD PRESSURE: 122 MMHG | DIASTOLIC BLOOD PRESSURE: 67 MMHG

## 2023-01-01 DIAGNOSIS — Z98.890 S/P MATRIXECTOMY OF TOE OF RIGHT FOOT: Primary | ICD-10-CM

## 2023-01-01 DIAGNOSIS — F41.9 ANXIETY DUE TO INVASIVE PROCEDURE: Primary | ICD-10-CM

## 2023-01-01 DIAGNOSIS — B99.9 INTRA-ABDOMINAL INFECTION: ICD-10-CM

## 2023-01-01 DIAGNOSIS — H61.23 BILATERAL IMPACTED CERUMEN: ICD-10-CM

## 2023-01-01 DIAGNOSIS — E04.1 THYROID NODULE: ICD-10-CM

## 2023-01-01 DIAGNOSIS — K76.89 NODULE ON LIVER: ICD-10-CM

## 2023-01-01 DIAGNOSIS — L60.0 INGROWN TOENAIL: Primary | ICD-10-CM

## 2023-01-01 DIAGNOSIS — E04.1 THYROID NODULE: Primary | ICD-10-CM

## 2023-01-01 DIAGNOSIS — M79.674 GREAT TOE PAIN, RIGHT: ICD-10-CM

## 2023-01-01 DIAGNOSIS — K66.8 PNEUMOPERITONEUM: ICD-10-CM

## 2023-01-01 LAB
ALBUMIN SERPL BCG-MCNC: 2.9 G/DL (ref 3.5–5.2)
ALBUMIN SERPL BCG-MCNC: 3.7 G/DL (ref 3.5–5.2)
ALBUMIN UR-MCNC: 50 MG/DL
ALP SERPL-CCNC: 63 U/L (ref 35–104)
ALP SERPL-CCNC: 93 U/L (ref 35–104)
ALT SERPL W P-5'-P-CCNC: 6 U/L (ref 0–50)
ALT SERPL W P-5'-P-CCNC: 8 U/L (ref 0–50)
ALT SERPL-CCNC: 14 IU/L (ref 6–31)
ANION GAP SERPL CALCULATED.3IONS-SCNC: 11 MMOL/L (ref 7–15)
ANION GAP SERPL CALCULATED.3IONS-SCNC: 13 MMOL/L (ref 7–15)
APPEARANCE UR: ABNORMAL
AST SERPL W P-5'-P-CCNC: 15 U/L (ref 0–45)
AST SERPL W P-5'-P-CCNC: 19 U/L (ref 0–45)
AST SERPL-CCNC: 20 IU/L (ref 10–40)
BACTERIA #/AREA URNS HPF: ABNORMAL /HPF
BACTERIA BLD CULT: NO GROWTH
BACTERIA BLD CULT: NO GROWTH
BASOPHILS # BLD AUTO: 0.1 10E3/UL (ref 0–0.2)
BASOPHILS # BLD AUTO: 0.1 10E3/UL (ref 0–0.2)
BASOPHILS NFR BLD AUTO: 0 %
BASOPHILS NFR BLD AUTO: 0 %
BILIRUB DIRECT SERPL-MCNC: 0.3 MG/DL (ref 0–0.3)
BILIRUB SERPL-MCNC: 0.6 MG/DL
BILIRUB SERPL-MCNC: 0.9 MG/DL
BILIRUB UR QL STRIP: NEGATIVE
BUN SERPL-MCNC: 28.5 MG/DL (ref 8–23)
BUN SERPL-MCNC: 30.7 MG/DL (ref 8–23)
CALCIUM SERPL-MCNC: 8.8 MG/DL (ref 8.8–10.2)
CALCIUM SERPL-MCNC: 9.6 MG/DL (ref 8.8–10.2)
CHLORIDE SERPL-SCNC: 101 MMOL/L (ref 98–107)
CHLORIDE SERPL-SCNC: 104 MMOL/L (ref 98–107)
COLOR UR AUTO: YELLOW
CREAT SERPL-MCNC: 1.15 MG/DL (ref 0.51–0.95)
CREAT SERPL-MCNC: 1.36 MG/DL (ref 0.51–0.95)
CREATININE (EXTERNAL): 0.97 MG/DL (ref 0.4–1)
DEPRECATED HCO3 PLAS-SCNC: 23 MMOL/L (ref 22–29)
DEPRECATED HCO3 PLAS-SCNC: 24 MMOL/L (ref 22–29)
EGFRCR SERPLBLD CKD-EPI 2021: 39 ML/MIN/1.73M2
EGFRCR SERPLBLD CKD-EPI 2021: 47 ML/MIN/1.73M2
EOSINOPHIL # BLD AUTO: 0 10E3/UL (ref 0–0.7)
EOSINOPHIL # BLD AUTO: 0.1 10E3/UL (ref 0–0.7)
EOSINOPHIL NFR BLD AUTO: 0 %
EOSINOPHIL NFR BLD AUTO: 1 %
ERYTHROCYTE [DISTWIDTH] IN BLOOD BY AUTOMATED COUNT: 15.2 % (ref 10–15)
ERYTHROCYTE [DISTWIDTH] IN BLOOD BY AUTOMATED COUNT: 15.5 % (ref 10–15)
GFR ESTIMATED (EXTERNAL): 59 ML/MIN/1.73M2
GLUCOSE (EXTERNAL): 92 MG/DL (ref 70–99)
GLUCOSE SERPL-MCNC: 145 MG/DL (ref 70–99)
GLUCOSE SERPL-MCNC: 98 MG/DL (ref 70–99)
GLUCOSE UR STRIP-MCNC: NEGATIVE MG/DL
HCT VFR BLD AUTO: 28.2 % (ref 35–47)
HCT VFR BLD AUTO: 34.6 % (ref 35–47)
HGB BLD-MCNC: 11.4 G/DL (ref 11.7–15.7)
HGB BLD-MCNC: 9.4 G/DL (ref 11.7–15.7)
HGB UR QL STRIP: NEGATIVE
HOLD SPECIMEN: NORMAL
HYALINE CASTS: 21 /LPF
IMM GRANULOCYTES # BLD: 0.1 10E3/UL
IMM GRANULOCYTES # BLD: 0.2 10E3/UL
IMM GRANULOCYTES NFR BLD: 1 %
IMM GRANULOCYTES NFR BLD: 1 %
KETONES UR STRIP-MCNC: ABNORMAL MG/DL
LACTATE SERPL-SCNC: 0.6 MMOL/L (ref 0.7–2)
LACTATE SERPL-SCNC: 1.5 MMOL/L (ref 0.7–2)
LEUKOCYTE ESTERASE UR QL STRIP: ABNORMAL
LIPASE SERPL-CCNC: 190 U/L (ref 13–60)
LYMPHOCYTES # BLD AUTO: 1.9 10E3/UL (ref 0.8–5.3)
LYMPHOCYTES # BLD AUTO: 2 10E3/UL (ref 0.8–5.3)
LYMPHOCYTES NFR BLD AUTO: 13 %
LYMPHOCYTES NFR BLD AUTO: 9 %
MCH RBC QN AUTO: 31 PG (ref 26.5–33)
MCH RBC QN AUTO: 31.1 PG (ref 26.5–33)
MCHC RBC AUTO-ENTMCNC: 32.9 G/DL (ref 31.5–36.5)
MCHC RBC AUTO-ENTMCNC: 33.3 G/DL (ref 31.5–36.5)
MCV RBC AUTO: 93 FL (ref 78–100)
MCV RBC AUTO: 94 FL (ref 78–100)
MONOCYTES # BLD AUTO: 0.9 10E3/UL (ref 0–1.3)
MONOCYTES # BLD AUTO: 1.1 10E3/UL (ref 0–1.3)
MONOCYTES NFR BLD AUTO: 4 %
MONOCYTES NFR BLD AUTO: 7 %
MUCOUS THREADS #/AREA URNS LPF: PRESENT /LPF
NEUTROPHILS # BLD AUTO: 11.8 10E3/UL (ref 1.6–8.3)
NEUTROPHILS # BLD AUTO: 18.5 10E3/UL (ref 1.6–8.3)
NEUTROPHILS NFR BLD AUTO: 78 %
NEUTROPHILS NFR BLD AUTO: 86 %
NITRATE UR QL: NEGATIVE
NRBC # BLD AUTO: 0 10E3/UL
NRBC # BLD AUTO: 0 10E3/UL
NRBC BLD AUTO-RTO: 0 /100
NRBC BLD AUTO-RTO: 0 /100
PATH REPORT.COMMENTS IMP SPEC: NORMAL
PATH REPORT.COMMENTS IMP SPEC: NORMAL
PATH REPORT.FINAL DX SPEC: NORMAL
PATH REPORT.FINAL DX SPEC: NORMAL
PATH REPORT.GROSS SPEC: NORMAL
PATH REPORT.GROSS SPEC: NORMAL
PATH REPORT.MICROSCOPIC SPEC OTHER STN: NORMAL
PATH REPORT.MICROSCOPIC SPEC OTHER STN: NORMAL
PH UR STRIP: 5 [PH] (ref 4.7–8)
PLATELET # BLD AUTO: 139 10E3/UL (ref 150–450)
PLATELET # BLD AUTO: 181 10E3/UL (ref 150–450)
POTASSIUM (EXTERNAL): 4.4 MEQ/L (ref 3.4–5.1)
POTASSIUM SERPL-SCNC: 4 MMOL/L (ref 3.4–5.3)
POTASSIUM SERPL-SCNC: 4.4 MMOL/L (ref 3.4–5.3)
PROCALCITONIN SERPL IA-MCNC: 0.36 NG/ML
PROCALCITONIN SERPL IA-MCNC: 0.36 NG/ML
PROT SERPL-MCNC: 5.3 G/DL (ref 6.4–8.3)
PROT SERPL-MCNC: 6.9 G/DL (ref 6.4–8.3)
RADIOLOGIST FLAGS: ABNORMAL
RBC # BLD AUTO: 3.02 10E6/UL (ref 3.8–5.2)
RBC # BLD AUTO: 3.68 10E6/UL (ref 3.8–5.2)
RBC URINE: 1 /HPF
SODIUM SERPL-SCNC: 138 MMOL/L (ref 135–145)
SODIUM SERPL-SCNC: 138 MMOL/L (ref 135–145)
SP GR UR STRIP: 1.03 (ref 1–1.03)
SQUAMOUS EPITHELIAL: 15 /HPF
UROBILINOGEN UR STRIP-MCNC: 2 MG/DL
WBC # BLD AUTO: 15.2 10E3/UL (ref 4–11)
WBC # BLD AUTO: 21.5 10E3/UL (ref 4–11)
WBC URINE: 2 /HPF

## 2023-01-01 PROCEDURE — 36415 COLL VENOUS BLD VENIPUNCTURE: CPT | Performed by: INTERNAL MEDICINE

## 2023-01-01 PROCEDURE — 96375 TX/PRO/DX INJ NEW DRUG ADDON: CPT

## 2023-01-01 PROCEDURE — 255N000002 HC RX 255 OP 636: Performed by: RADIOLOGY

## 2023-01-01 PROCEDURE — 250N000009 HC RX 250: Performed by: RADIOLOGY

## 2023-01-01 PROCEDURE — 99285 EMERGENCY DEPT VISIT HI MDM: CPT | Mod: 25

## 2023-01-01 PROCEDURE — 81001 URINALYSIS AUTO W/SCOPE: CPT | Performed by: NURSE PRACTITIONER

## 2023-01-01 PROCEDURE — 85025 COMPLETE CBC W/AUTO DIFF WBC: CPT | Performed by: INTERNAL MEDICINE

## 2023-01-01 PROCEDURE — 83605 ASSAY OF LACTIC ACID: CPT | Performed by: NURSE PRACTITIONER

## 2023-01-01 PROCEDURE — 88172 CYTP DX EVAL FNA 1ST EA SITE: CPT | Mod: TC | Performed by: OTOLARYNGOLOGY

## 2023-01-01 PROCEDURE — 10006 FNA BX W/US GDN EA ADDL: CPT

## 2023-01-01 PROCEDURE — 85025 COMPLETE CBC W/AUTO DIFF WBC: CPT | Performed by: NURSE PRACTITIONER

## 2023-01-01 PROCEDURE — 99203 OFFICE O/P NEW LOW 30 MIN: CPT | Mod: 25 | Performed by: OTOLARYNGOLOGY

## 2023-01-01 PROCEDURE — 250N000011 HC RX IP 250 OP 636: Mod: JZ | Performed by: NURSE PRACTITIONER

## 2023-01-01 PROCEDURE — G0463 HOSPITAL OUTPT CLINIC VISIT: HCPCS

## 2023-01-01 PROCEDURE — 80053 COMPREHEN METABOLIC PANEL: CPT | Performed by: NURSE PRACTITIONER

## 2023-01-01 PROCEDURE — 88173 CYTOPATH EVAL FNA REPORT: CPT | Mod: 26 | Performed by: PATHOLOGY

## 2023-01-01 PROCEDURE — G0463 HOSPITAL OUTPT CLINIC VISIT: HCPCS | Mod: 25

## 2023-01-01 PROCEDURE — 88305 TISSUE EXAM BY PATHOLOGIST: CPT | Mod: 26 | Performed by: PATHOLOGY

## 2023-01-01 PROCEDURE — 99285 EMERGENCY DEPT VISIT HI MDM: CPT | Performed by: NURSE PRACTITIONER

## 2023-01-01 PROCEDURE — 36415 COLL VENOUS BLD VENIPUNCTURE: CPT | Performed by: STUDENT IN AN ORGANIZED HEALTH CARE EDUCATION/TRAINING PROGRAM

## 2023-01-01 PROCEDURE — G1010 CDSM STANSON: HCPCS

## 2023-01-01 PROCEDURE — 80053 COMPREHEN METABOLIC PANEL: CPT | Performed by: INTERNAL MEDICINE

## 2023-01-01 PROCEDURE — 99212 OFFICE O/P EST SF 10 MIN: CPT | Performed by: PODIATRIST

## 2023-01-01 PROCEDURE — 84145 PROCALCITONIN (PCT): CPT | Performed by: NURSE PRACTITIONER

## 2023-01-01 PROCEDURE — 10005 FNA BX W/US GDN 1ST LES: CPT

## 2023-01-01 PROCEDURE — 83690 ASSAY OF LIPASE: CPT | Performed by: NURSE PRACTITIONER

## 2023-01-01 PROCEDURE — A9585 GADOBUTROL INJECTION: HCPCS | Performed by: RADIOLOGY

## 2023-01-01 PROCEDURE — 83605 ASSAY OF LACTIC ACID: CPT | Performed by: INTERNAL MEDICINE

## 2023-01-01 PROCEDURE — 250N000009 HC RX 250: Performed by: PODIATRIST

## 2023-01-01 PROCEDURE — 87040 BLOOD CULTURE FOR BACTERIA: CPT | Performed by: NURSE PRACTITIONER

## 2023-01-01 PROCEDURE — 11750 EXCISION NAIL&NAIL MATRIX: CPT | Performed by: PODIATRIST

## 2023-01-01 PROCEDURE — 96361 HYDRATE IV INFUSION ADD-ON: CPT

## 2023-01-01 PROCEDURE — 74177 CT ABD & PELVIS W/CONTRAST: CPT | Mod: MG

## 2023-01-01 PROCEDURE — 250N000011 HC RX IP 250 OP 636: Performed by: NURSE PRACTITIONER

## 2023-01-01 PROCEDURE — 96365 THER/PROPH/DIAG IV INF INIT: CPT | Mod: XU

## 2023-01-01 PROCEDURE — 84145 PROCALCITONIN (PCT): CPT | Performed by: INTERNAL MEDICINE

## 2023-01-01 PROCEDURE — 69210 REMOVE IMPACTED EAR WAX UNI: CPT | Performed by: OTOLARYNGOLOGY

## 2023-01-01 PROCEDURE — 36415 COLL VENOUS BLD VENIPUNCTURE: CPT | Performed by: NURSE PRACTITIONER

## 2023-01-01 PROCEDURE — 250N000011 HC RX IP 250 OP 636: Performed by: INTERNAL MEDICINE

## 2023-01-01 PROCEDURE — 258N000003 HC RX IP 258 OP 636: Performed by: INTERNAL MEDICINE

## 2023-01-01 PROCEDURE — 250N000011 HC RX IP 250 OP 636: Performed by: PODIATRIST

## 2023-01-01 PROCEDURE — 88305 TISSUE EXAM BY PATHOLOGIST: CPT | Mod: TC | Performed by: OTOLARYNGOLOGY

## 2023-01-01 PROCEDURE — 96376 TX/PRO/DX INJ SAME DRUG ADON: CPT

## 2023-01-01 PROCEDURE — 96366 THER/PROPH/DIAG IV INF ADDON: CPT

## 2023-01-01 PROCEDURE — 96367 TX/PROPH/DG ADDL SEQ IV INF: CPT

## 2023-01-01 PROCEDURE — 74183 MRI ABD W/O CNTR FLWD CNTR: CPT

## 2023-01-01 PROCEDURE — 82248 BILIRUBIN DIRECT: CPT | Performed by: NURSE PRACTITIONER

## 2023-01-01 PROCEDURE — 88172 CYTP DX EVAL FNA 1ST EA SITE: CPT | Mod: 26 | Performed by: PATHOLOGY

## 2023-01-01 PROCEDURE — 258N000003 HC RX IP 258 OP 636: Performed by: NURSE PRACTITIONER

## 2023-01-01 PROCEDURE — 85025 COMPLETE CBC W/AUTO DIFF WBC: CPT | Performed by: STUDENT IN AN ORGANIZED HEALTH CARE EDUCATION/TRAINING PROGRAM

## 2023-01-01 PROCEDURE — 96368 THER/DIAG CONCURRENT INF: CPT

## 2023-01-01 RX ORDER — DEXTROSE MONOHYDRATE 25 G/50ML
25-50 INJECTION, SOLUTION INTRAVENOUS
Status: DISCONTINUED | OUTPATIENT
Start: 2023-01-01 | End: 2023-01-01 | Stop reason: HOSPADM

## 2023-01-01 RX ORDER — NICOTINE POLACRILEX 4 MG
15-30 LOZENGE BUCCAL
Status: CANCELLED | OUTPATIENT
Start: 2023-01-01

## 2023-01-01 RX ORDER — LORAZEPAM 0.5 MG/1
TABLET ORAL
Qty: 2 TABLET | Refills: 0 | Status: SHIPPED | OUTPATIENT
Start: 2023-01-01 | End: 2023-01-01

## 2023-01-01 RX ORDER — SODIUM CHLORIDE 9 MG/ML
INJECTION, SOLUTION INTRAVENOUS CONTINUOUS PRN
Status: CANCELLED | OUTPATIENT
Start: 2023-01-01

## 2023-01-01 RX ORDER — HYDROCODONE BITARTRATE AND ACETAMINOPHEN 10; 325 MG/1; MG/1
1 TABLET ORAL ONCE
Qty: 1 TABLET | Refills: 0 | Status: SHIPPED | OUTPATIENT
Start: 2023-01-01 | End: 2023-01-01

## 2023-01-01 RX ORDER — LIDOCAINE 40 MG/G
CREAM TOPICAL
Status: DISCONTINUED | OUTPATIENT
Start: 2023-01-01 | End: 2023-01-01 | Stop reason: HOSPADM

## 2023-01-01 RX ORDER — LORAZEPAM 1 MG/1
TABLET ORAL
COMMUNITY
Start: 2023-01-01 | End: 2023-01-01 | Stop reason: ALTCHOICE

## 2023-01-01 RX ORDER — LIDOCAINE HYDROCHLORIDE 20 MG/ML
40 INJECTION, SOLUTION INFILTRATION; PERINEURAL ONCE
Status: COMPLETED | OUTPATIENT
Start: 2023-01-01 | End: 2023-01-01

## 2023-01-01 RX ORDER — DEXTROSE MONOHYDRATE 25 G/50ML
25-50 INJECTION, SOLUTION INTRAVENOUS
Status: CANCELLED | OUTPATIENT
Start: 2023-01-01

## 2023-01-01 RX ORDER — HYDROMORPHONE HYDROCHLORIDE 1 MG/ML
0.2 INJECTION, SOLUTION INTRAMUSCULAR; INTRAVENOUS; SUBCUTANEOUS
Status: COMPLETED | OUTPATIENT
Start: 2023-01-01 | End: 2023-01-01

## 2023-01-01 RX ORDER — HYDROCODONE BITARTRATE AND ACETAMINOPHEN 10; 325 MG/1; MG/1
TABLET ORAL
COMMUNITY
Start: 2023-01-01

## 2023-01-01 RX ORDER — CLINDAMYCIN HCL 300 MG
300 CAPSULE ORAL 4 TIMES DAILY
Qty: 28 CAPSULE | Refills: 0 | Status: SHIPPED | OUTPATIENT
Start: 2023-01-01 | End: 2023-01-01

## 2023-01-01 RX ORDER — LIDOCAINE HYDROCHLORIDE 10 MG/ML
10 INJECTION, SOLUTION EPIDURAL; INFILTRATION; INTRACAUDAL; PERINEURAL ONCE
Status: COMPLETED | OUTPATIENT
Start: 2023-01-01 | End: 2023-01-01

## 2023-01-01 RX ORDER — DEXAMETHASONE SODIUM PHOSPHATE 4 MG/ML
4 INJECTION, SOLUTION INTRA-ARTICULAR; INTRALESIONAL; INTRAMUSCULAR; INTRAVENOUS; SOFT TISSUE ONCE
Status: COMPLETED | OUTPATIENT
Start: 2023-01-01 | End: 2023-01-01

## 2023-01-01 RX ORDER — NICOTINE POLACRILEX 4 MG
15-30 LOZENGE BUCCAL
Status: DISCONTINUED | OUTPATIENT
Start: 2023-01-01 | End: 2023-01-01 | Stop reason: HOSPADM

## 2023-01-01 RX ORDER — HYDROCODONE BITARTRATE AND ACETAMINOPHEN 10; 325 MG/1; MG/1
TABLET ORAL
COMMUNITY
Start: 2023-01-01 | End: 2023-01-01

## 2023-01-01 RX ORDER — NITROFURANTOIN 25; 75 MG/1; MG/1
100 CAPSULE ORAL
COMMUNITY
Start: 2022-10-12 | End: 2023-01-01

## 2023-01-01 RX ORDER — LIDOCAINE 40 MG/G
CREAM TOPICAL
Status: CANCELLED | OUTPATIENT
Start: 2023-01-01

## 2023-01-01 RX ORDER — LORAZEPAM 0.5 MG/1
TABLET ORAL
Qty: 2 TABLET | Refills: 0 | Status: SHIPPED | OUTPATIENT
Start: 2023-01-01

## 2023-01-01 RX ORDER — APIXABAN 2.5 MG/1
TABLET, FILM COATED ORAL
COMMUNITY
Start: 2023-01-01

## 2023-01-01 RX ORDER — LIDOCAINE HYDROCHLORIDE 10 MG/ML
10 INJECTION, SOLUTION EPIDURAL; INFILTRATION; INTRACAUDAL; PERINEURAL ONCE
Status: CANCELLED | OUTPATIENT
Start: 2023-01-01 | End: 2023-01-01

## 2023-01-01 RX ORDER — CEFEPIME HYDROCHLORIDE 2 G/1
2 INJECTION, POWDER, FOR SOLUTION INTRAVENOUS ONCE
Status: COMPLETED | OUTPATIENT
Start: 2023-01-01 | End: 2023-01-01

## 2023-01-01 RX ORDER — SODIUM CHLORIDE 9 MG/ML
INJECTION, SOLUTION INTRAVENOUS CONTINUOUS PRN
Status: DISCONTINUED | OUTPATIENT
Start: 2023-01-01 | End: 2023-01-01 | Stop reason: HOSPADM

## 2023-01-01 RX ORDER — ONDANSETRON 2 MG/ML
4 INJECTION INTRAMUSCULAR; INTRAVENOUS ONCE
Status: COMPLETED | OUTPATIENT
Start: 2023-01-01 | End: 2023-01-01

## 2023-01-01 RX ORDER — TRIAMCINOLONE ACETONIDE 40 MG/ML
40 INJECTION, SUSPENSION INTRA-ARTICULAR; INTRAMUSCULAR ONCE
Status: COMPLETED | OUTPATIENT
Start: 2023-01-01 | End: 2023-01-01

## 2023-01-01 RX ORDER — ISOSORBIDE MONONITRATE 60 MG/1
TABLET, EXTENDED RELEASE ORAL
COMMUNITY
Start: 2023-01-01

## 2023-01-01 RX ORDER — GADOBUTROL 604.72 MG/ML
7.5 INJECTION INTRAVENOUS ONCE
Status: COMPLETED | OUTPATIENT
Start: 2023-01-01 | End: 2023-01-01

## 2023-01-01 RX ORDER — NITROFURANTOIN 25; 75 MG/1; MG/1
CAPSULE ORAL
COMMUNITY
Start: 2022-10-12 | End: 2023-01-01

## 2023-01-01 RX ORDER — SACCHAROMYCES BOULARDII 250 MG
250 CAPSULE ORAL 2 TIMES DAILY
Qty: 20 CAPSULE | Refills: 0 | Status: SHIPPED | OUTPATIENT
Start: 2023-01-01 | End: 2023-01-01

## 2023-01-01 RX ORDER — IOPAMIDOL 755 MG/ML
61 INJECTION, SOLUTION INTRAVASCULAR ONCE
Status: COMPLETED | OUTPATIENT
Start: 2023-01-01 | End: 2023-01-01

## 2023-01-01 RX ORDER — SODIUM CHLORIDE 9 MG/ML
INJECTION, SOLUTION INTRAVENOUS CONTINUOUS
Status: DISCONTINUED | OUTPATIENT
Start: 2023-01-01 | End: 2023-01-01 | Stop reason: HOSPADM

## 2023-01-01 RX ORDER — HYDROMORPHONE HYDROCHLORIDE 1 MG/ML
0.5 INJECTION, SOLUTION INTRAMUSCULAR; INTRAVENOUS; SUBCUTANEOUS ONCE
Status: COMPLETED | OUTPATIENT
Start: 2023-01-01 | End: 2023-01-01

## 2023-01-01 RX ORDER — PRAVASTATIN SODIUM 10 MG
TABLET ORAL
COMMUNITY
Start: 2023-01-01

## 2023-01-01 RX ORDER — METRONIDAZOLE 500 MG/100ML
500 INJECTION, SOLUTION INTRAVENOUS ONCE
Status: COMPLETED | OUTPATIENT
Start: 2023-01-01 | End: 2023-01-01

## 2023-01-01 RX ADMIN — VANCOMYCIN HYDROCHLORIDE 1250 MG: 5 INJECTION, POWDER, LYOPHILIZED, FOR SOLUTION INTRAVENOUS at 21:22

## 2023-01-01 RX ADMIN — SODIUM CHLORIDE, POTASSIUM CHLORIDE, SODIUM LACTATE AND CALCIUM CHLORIDE 1000 ML: 600; 310; 30; 20 INJECTION, SOLUTION INTRAVENOUS at 19:14

## 2023-01-01 RX ADMIN — DEXAMETHASONE SODIUM PHOSPHATE 4 MG: 4 INJECTION, SOLUTION INTRA-ARTICULAR; INTRALESIONAL; INTRAMUSCULAR; INTRAVENOUS; SOFT TISSUE at 11:16

## 2023-01-01 RX ADMIN — ONDANSETRON 4 MG: 2 INJECTION INTRAMUSCULAR; INTRAVENOUS at 23:12

## 2023-01-01 RX ADMIN — IOPAMIDOL 61 ML: 755 INJECTION, SOLUTION INTRAVENOUS at 20:32

## 2023-01-01 RX ADMIN — SODIUM CHLORIDE: 9 INJECTION, SOLUTION INTRAVENOUS at 03:37

## 2023-01-01 RX ADMIN — HYDROMORPHONE HYDROCHLORIDE 0.2 MG: 1 INJECTION, SOLUTION INTRAMUSCULAR; INTRAVENOUS; SUBCUTANEOUS at 20:52

## 2023-01-01 RX ADMIN — LIDOCAINE HYDROCHLORIDE 10 ML: 10 INJECTION, SOLUTION EPIDURAL; INFILTRATION; INTRACAUDAL; PERINEURAL at 12:29

## 2023-01-01 RX ADMIN — LIDOCAINE HYDROCHLORIDE 15 ML: 10 INJECTION, SOLUTION EPIDURAL; INFILTRATION; INTRACAUDAL; PERINEURAL at 11:52

## 2023-01-01 RX ADMIN — GADOBUTROL 7.5 ML: 604.72 INJECTION INTRAVENOUS at 11:54

## 2023-01-01 RX ADMIN — METRONIDAZOLE 500 MG: 500 INJECTION, SOLUTION INTRAVENOUS at 21:07

## 2023-01-01 RX ADMIN — LIDOCAINE HYDROCHLORIDE 40 MG: 20 INJECTION, SOLUTION INFILTRATION; PERINEURAL at 11:17

## 2023-01-01 RX ADMIN — TRIAMCINOLONE ACETONIDE 40 MG: 40 INJECTION, SUSPENSION INTRA-ARTICULAR; INTRAMUSCULAR at 11:16

## 2023-01-01 RX ADMIN — CEFEPIME 2 G: 2 INJECTION, POWDER, FOR SOLUTION INTRAVENOUS at 21:20

## 2023-01-01 RX ADMIN — HYDROMORPHONE HYDROCHLORIDE 0.5 MG: 1 INJECTION, SOLUTION INTRAMUSCULAR; INTRAVENOUS; SUBCUTANEOUS at 03:37

## 2023-01-01 ASSESSMENT — ENCOUNTER SYMPTOMS
HEMATOLOGIC/LYMPHATIC NEGATIVE: 1
DIARRHEA: 0
EYES NEGATIVE: 1
RESPIRATORY NEGATIVE: 1
ABDOMINAL PAIN: 1
ALLERGIC/IMMUNOLOGIC NEGATIVE: 1
NAUSEA: 0
CONSTIPATION: 1
BLOOD IN STOOL: 0
CONSTITUTIONAL NEGATIVE: 1
CARDIOVASCULAR NEGATIVE: 1
ENDOCRINE NEGATIVE: 1
PSYCHIATRIC NEGATIVE: 1
MUSCULOSKELETAL NEGATIVE: 1
NEUROLOGICAL NEGATIVE: 1
VOMITING: 0

## 2023-01-01 ASSESSMENT — ACTIVITIES OF DAILY LIVING (ADL)
ADLS_ACUITY_SCORE: 37

## 2023-01-01 ASSESSMENT — PAIN SCALES - GENERAL
PAINLEVEL: SEVERE PAIN (6)
PAINLEVEL: MODERATE PAIN (5)
PAINLEVEL: SEVERE PAIN (6)

## 2023-01-31 NOTE — PATIENT INSTRUCTIONS
Nail procedure care:  -Start epsom salt soaks tomorrow. Soak the foot 1-2 times a day for 20 minutes.  -Apply an antibiotic cream, gauze and a bandaid over the toe for the first week after the procedure.  -After one week, switch to a betadine dressing. Apply a small amount of betadine on gauze or dab the betadine over the toe with gauze and apply another dry gauze over the toe followed by a band aid or tape.  -Do not apply a band aid directly over the nail procedure site without gauze.     Keep the toe covered at all times until it is completely healed.  -You may develop a black scab over the nail bed--let this fall off on its own and don't pick at it.  -The toe may drain for 2-3 weeks. It is normal for it to have a clear drainage.    Watching for signs of infection:  If the toe has a thick, white pus coming from the procedure site or if the the toe becomes red, swollen, painful, or you begin to feel sick (fever/chills/nausea/vomitting), return to the podiatry clinic immediately or to the emergency room if after hours.     -Take Clindamycin 300mg, take this four times daily for seven days  -Take Florastor 250mg, take this twice daily for ten days. This may help with stomach upset or diarrhea. Call podiatry or go to Urgent Care or the Emergency Department if you develop severe diarrhea or signs of infection in the toe as listed above.    Thank you for allowing  and our Podiatry team to participate in your care. Please call our office at [phone] with scheduling questions or with any other questions or concerns.

## 2023-01-31 NOTE — PROGRESS NOTES
Chief complaint: Patient presents with:  Ingrown Toenail: Right great toe      History of Present Illness: This 82 year old female is seen at the request of No ref. provider found for evaluation and suggestions of management of a painful ingrown toenail on the RIGHT hallux bilateral toenail border. The ingrown toenail has been causing her pain for a couple months, but worsened the past couple of weeks. She has consistent pain on both borders of the toenail.    Historically, patient says she had a history of sepsis in the RIGHT ankle in 2021. She had developed RIGHT ankle pain and by the time she presented to the Emergency Department, she was septic. She was transferred to [place] for antibiotics and she was on a PICC line for six weeks. The source of the sepsis to the ankle was never discovered as she never had an open wound or other obvious source for the infection.    Patient says is also treated at the [facility] for multiple comorbidities. She called the [facility] prior to scheduling her podiatry appointment and she says they approved a toenail procedure. She is normally on antibiotics prior to dental procedures.    No further pedal complaints today.         BP (!) 158/75 (BP Location: Left arm, Patient Position: Sitting, Cuff Size: Adult Regular)   Pulse 74   Temp 97.8  F (36.6  C) (Tympanic)   SpO2 99%     Patient Active Problem List   Diagnosis     Coronary artery disease with stable angina pectoris (H)     S/P aortic valve replacement     Lower GI bleeding     Head injury     Atrial fibrillation, chronic (H)     Long term (current) use of opiate analgesic     Status post coronary artery bypass graft     Essential hypertension     Fibromyalgia     Gout     Impaired fasting glucose     Iron deficiency anemia due to chronic blood loss     Hyperlipidemia     S/P thoracic aortic aneurysm repair     Tubular adenoma of colon     Hypomagnesemia     Hyperkalemia       Past Surgical History:   Procedure  Laterality Date     AORTIC VALVE REPLACEMENT  05/2008    Mitroflow [employer] bioprosthesis     APPENDECTOMY       C OB/GYN PROCEDURE                          DATE:  1986    Vaginal granulation tissue removed     CARDIAC CATHERIZATION  01/04/2016     COLONOSCOPY      and polypectomy     HISTORY OF CABG  05/2008    Single vessel     HISTORY OF CATARACT REPAIR Right 05/09/2011     HISTORY OF THORACIC AORTIC ANEURYSM REPAIR  05/2008    Ascending aorta, tube graft     SIOBHAN/BSO  1984    Benign fibroid     TONSILLECTOMY & ADENOIDECTOMY  1949       Current Outpatient Medications   Medication     allopurinol (ZYLOPRIM) 100 MG tablet     amLODIPine (NORVASC) 10 MG tablet     apixaban ANTICOAGULANT (ELIQUIS) 5 MG tablet     HYDROcodone-acetaminophen (NORCO) 5-325 MG tablet     isosorbide mononitrate (IMDUR) 30 MG 24 hr tablet     nadolol (CORGARD) 80 MG tablet     nadolol (CORGARD) 80 MG tablet     nitroGLYcerin (NITROSTAT) 0.4 MG sublingual tablet     Omega-3 Fatty Acids (OMEGA-3 CF) 1000 MG CAPS     omeprazole (PRILOSEC) 20 MG CR capsule     pravastatin (PRAVACHOL) 20 MG tablet     traMADol (ULTRAM) 50 MG tablet     ferrous sulfate (FEROSUL) 325 (65 Fe) MG tablet     hydrochlorothiazide (HYDRODIURIL) 12.5 MG tablet     Magnesium 400 MG TABS     magnesium oxide (MAG-OX) 400 (241.3 Mg) MG tablet     No current facility-administered medications for this visit.          Allergies   Allergen Reactions     Bactrim [Sulfamethoxazole W/Trimethoprim] Other (See Comments)     Renal failure, hyperkalemia     Atorvastatin Other (See Comments)     muscle aches     Codeine GI Disturbance     Causes GI upset (scanned record).     Crestor [Rosuvastatin] Muscle Pain (Myalgia)     Doxycycline      nausea     Meticorten Other (See Comments)     Can't sleep     Penicillins      Distant history of a rash  Injection site reaction     Prednisone Other (See Comments)     insomnia     Simvastatin Muscle Pain (Myalgia)       Family History   Problem  Relation Age of Onset     Diabetes Mother      Myocardial Infarction Father         50s     Kidney Cancer Brother      Diabetes Son        Social History     Socioeconomic History     Marital status:      Spouse name: None     Number of children: None     Years of education: None     Highest education level: None   Tobacco Use     Smoking status: Never     Smokeless tobacco: Never   Substance and Sexual Activity     Alcohol use: No     Drug use: No   Social History Narrative    Lives alone in [place].  2007. Son lives several blocks away. Retired.       ROS: 10 point ROS neg other than the symptoms noted above in the HPI.  EXAM  Constitutional: healthy, alert and no distress    Psychiatric: mentation appears normal and affect normal/bright    VASCULAR:  -Dorsalis pedis pulse +2/4 b/l  -Posterior tibial pulse +2/4 b/l  -Capillary refill time < 3 seconds to b/l hallux  -Telangiectasias to the bilateral foot and ankle  NEURO:  -Light touch sensation intact to b/l plantar forefoot  DERM:  -Skin temperature, texture and turgor WNL b/l    -Incurvation to the bilateral border of the RIGHT hallux  ---Central toenail is moderately thickened and mildly loose from the underlying nail be  ---Mild erythema and mild-to-moderate edema to the nail borders  ---No open wounds and no drainage  ---No severe erythema, no ascending erythema, no calor, no purulence, no malodor, no other SOI.  MSK:  -Moderate tenderness on palpation to the bilateral toenail border of the RIGHT hallux  -Muscle strength of ankles +5/5 for dorsiflexion, plantarflexion, ABDUction and ADDuction b/l    ============================================================    ASSESSMENT:  (L60.0) Ingrown toenail  (primary encounter diagnosis)    (M79.674) Great toe pain, right      PLAN:  -Patient evaluated and examined. Treatment options discussed with no educational barriers noted.  --Discussed nail procedure options and etiologies and treatments for  ingrown toenails. Conservatively, patient could opt for a slant back today and keep monitoring the toe since there are no SOI. Discussed risks and benefits and healing course of a nail border avulsion vs. Matrixectomy including post procedure infection or a non-healing wound, both of which could lead to a life threatening infection or amputation of the foot or leg or a proximal amputation. Patient understands that a procedure can place her at risk of infection. She is already a higher risk patient with her history of sepsis in the ankle. However, she also has moderate incurvation to the toenail borders and although not yet infected, the skin is very painful and irritated and may also possibly cause as source of infection if not treated. Because her central toenail is thick and mildly loose, a nail border matrixectomy would likely cause the central toenail to become loose and trap moisture beneath the toenail or fall off. She is advised to take an antibiotic prophylactically because of her history of sepsis. She is in agreement with this plan. Patient understands the risks and benefits and has decided to proceed with the following:    -Matrixectomy of right hallux toenail: Written and verbal consent obtained after reviewing risks and benefits of the procedure. Patient understands that although phenol is used in attempt to prevent regrowth of the ingrown toenail, the nail can still grow back. There is also a risk of post procedure infection. A severe foot infection could lead to a proximal foot or leg amputation or loss of life, so the patient is advised to return to podiatry or the ED immediately if the patient notices any SOI. The patient is in agreement with this plan and wishes to proceed with the procedure. A time-out was performed to identify the correct patient, limb, digit and procedure.    An alcohol prep pad was applied to  to the base of the right hallux. The digit was injected with 8 mL of 0.5% marcaine  "plain in a ring block fashion at the base of the toe. Patient still experienced pain, so an additional 3mL of 0.5% Marcaine plain was injected proximal to the medial and lateral nail border. Adequate local anesthesia was obtained. A ring tournicot was applied to the digit and a chloroprep was applied to the hallux. A freer was used to loosen the nail from the underlying nail bed. An English Anvil and a hemostat were then used to remove the nail in total from the RIGHT hallux. A total of three applications of phenol were applied for 30 seconds per application. The digit was rinsed thoroughly with alcohol. The tournicot was removed from the RIGHT hallux and there was a prompt hyperemic response to the RIGHT hallux. The wound was then dressed with an Silvadene, gauze and 1\" coban. The patient was educated on after procedure care including daily epsom salt soaks starting tomorrow followed by dressing of the toe with an antibiotic cream and a bandaid until the wound site on the toe stops draining (2-3 weeks). Provided education on how to look for signs of infection (redness, swelling, pain, purulence, fever, chills, nausea, vomiting) and the patient was instructed to return to the clinic or Emergency Department immediately if there are any signs of infection.    -Discussed antibiotic options with the inpatient pharmacist, Eleonora. Patient has an allergy to Bactrim, Doxycycline jazmin Penicillins. She may take Clindamycin. The pharmacist thought it was appropriate prescribing Clindamycin 300mg four times daily for seven days because of her history of sepsis in the ankle. The probiotic, Florastor, was also ordered and should be taken twice daily for ten days. Instructions were written out for the patient.  ---If patient develops signs of infection (redness, swelling, pain, purulence, fever, chills, nausea, vomiting), or if she develops severe diarrhea or ankle pain, she should not hesitate to call podiatry, go to  or go to the " Emergency Department. She is in agreement with this plan.    -Patient in agreement with the above treatment plan and all of patient's questions were answered.      Return to clinic two weeks to evaluate healing from the RIGHT hallux toenail matrixectomy        Jessica Godwin DPM

## 2023-02-01 NOTE — TELEPHONE ENCOUNTER
Please call patient today as she has questions regarding changing the dressing from her procedure by Dr. Godwin and questions regarding the antibiotic as well.

## 2023-02-01 NOTE — TELEPHONE ENCOUNTER
Returned call, patient had question regarding her toe care and medication. Spoke with  patient is to continue to take the antibiotic, she can go down to three pills a day and if redness increases than increase back to 4 pills a day of the antibiotic. Pt can soak her foot once or twice a day, it's up to her how it feels. Mahnaz aBbcock, JOVANNIN

## 2023-02-13 NOTE — PROGRESS NOTES
Chief complaint: Patient presents with:  Wound Check: Recheck matrix      History of Present Illness: This 82 year old female is seen for follow-up management of a RIGHT hallux toenail matrixectomy (performed on 01/31/2021).    Patient has been soaking the foot in epsom salts every day. Patient had applied antibiotic ointment dressing and has more recently been applying a betadine dressing. The ingrown procedure site has caused little pain and the overall toe pain is much improved compared to the pain from the ingrown toenail. It is , but it is not as painful as the ingrown toenail was for her.    She took antibiotics, but she didn't finish all of them because she says they were tough on her. She took the probiotic and at least half the antibiotic prescription.    No further pedal complaints today.         BP (!) 144/75 (BP Location: Left arm, Patient Position: Sitting, Cuff Size: Adult Regular)   Pulse 76   Temp 97.4  F (36.3  C) (Tympanic)   SpO2 96%     Patient Active Problem List   Diagnosis     Coronary artery disease with stable angina pectoris (H)     S/P aortic valve replacement     Lower GI bleeding     Head injury     Atrial fibrillation, chronic (H)     Long term (current) use of opiate analgesic     Status post coronary artery bypass graft     Essential hypertension     Fibromyalgia     Gout     Impaired fasting glucose     Iron deficiency anemia due to chronic blood loss     Hyperlipidemia     S/P thoracic aortic aneurysm repair     Tubular adenoma of colon     Hypomagnesemia     Hyperkalemia       Past Surgical History:   Procedure Laterality Date     AORTIC VALVE REPLACEMENT  05/2008    Mitroflow [employer] bioprosthesis     APPENDECTOMY       C OB/GYN PROCEDURE                          DATE:  1986    Vaginal granulation tissue removed     CARDIAC CATHERIZATION  01/04/2016     COLONOSCOPY      and polypectomy     HISTORY OF CABG  05/2008    Single vessel     HISTORY OF CATARACT REPAIR  Right 05/09/2011     HISTORY OF THORACIC AORTIC ANEURYSM REPAIR  05/2008    Ascending aorta, tube graft     SIOBHAN/BSO  1984    Benign fibroid     TONSILLECTOMY & ADENOIDECTOMY  1949       Current Outpatient Medications   Medication     allopurinol (ZYLOPRIM) 100 MG tablet     amLODIPine (NORVASC) 10 MG tablet     apixaban ANTICOAGULANT (ELIQUIS) 5 MG tablet     HYDROcodone-acetaminophen (NORCO) 5-325 MG tablet     isosorbide mononitrate (IMDUR) 30 MG 24 hr tablet     nadolol (CORGARD) 80 MG tablet     nadolol (CORGARD) 80 MG tablet     nitroGLYcerin (NITROSTAT) 0.4 MG sublingual tablet     Omega-3 Fatty Acids (OMEGA-3 CF) 1000 MG CAPS     omeprazole (PRILOSEC) 20 MG CR capsule     pravastatin (PRAVACHOL) 20 MG tablet     traMADol (ULTRAM) 50 MG tablet     ferrous sulfate (FEROSUL) 325 (65 Fe) MG tablet     hydrochlorothiazide (HYDRODIURIL) 12.5 MG tablet     Magnesium 400 MG TABS     magnesium oxide (MAG-OX) 400 (241.3 Mg) MG tablet     No current facility-administered medications for this visit.          Allergies   Allergen Reactions     Bactrim [Sulfamethoxazole W/Trimethoprim] Other (See Comments)     Renal failure, hyperkalemia     Atorvastatin Other (See Comments)     muscle aches     Codeine GI Disturbance     Causes GI upset (scanned record).     Crestor [Rosuvastatin] Muscle Pain (Myalgia)     Doxycycline      nausea     Meticorten Other (See Comments)     Can't sleep     Penicillins      Distant history of a rash  Injection site reaction     Prednisone Other (See Comments)     insomnia     Simvastatin Muscle Pain (Myalgia)       Family History   Problem Relation Age of Onset     Diabetes Mother      Myocardial Infarction Father         50s     Kidney Cancer Brother      Diabetes Son        Social History     Socioeconomic History     Marital status:      Spouse name: None     Number of children: None     Years of education: None     Highest education level: None   Tobacco Use     Smoking status: Never      Smokeless tobacco: Never   Substance and Sexual Activity     Alcohol use: No     Drug use: No   Social History Narrative    Lives alone in [place].  2007. Son lives several blocks away. Retired.       ROS: 10 point ROS neg other than the symptoms noted above in the HPI.  EXAM  Constitutional: healthy, alert and no distress    Psychiatric: mentation appears normal and affect normal/bright    VASCULAR:  -Dorsalis pedis pulse +2/4 b/l  -Posterior tibial pulse +2/4 b/l  -Capillary refill time < 3 seconds to b/l hallux  -Telangiectasias to the bilateral foot and ankle  NEURO:  -Light touch sensation intact to b/l plantar forefoot  DERM:  -Skin temperature, texture and turgor WNL b/l    -Matrixectomy site on the RIGHT hallux  ---Mild hyperkeratotic lesion with betadine staining to the callus  ---No drainage  ---No severe erythema, no ascending erythema, no calor, no purulence, no malodor, no other SOI.  MSK:  -Moderate tenderness on palpation to the bilateral toenail border of the RIGHT hallux  -Muscle strength of ankles +5/5 for dorsiflexion, plantarflexion, ABDUction and ADDuction b/l    ============================================================    ASSESSMENT:  (Z98.890) S/P matrixectomy of toe of right foot  (primary encounter diagnosis)      PLAN:  -Patient evaluated and examined. Treatment options discussed with no educational barriers noted.    -Dressed the RIGHT hallux hallux digits with dry gauze and tape  -Continue epsom salt soaks as needed (no longer needs to be daily)  -Patient instructed to continue dressings until there is no further drainage. There is no drainage today, so she may try this for the next two days to make sure there is no drainage for the next two days.  -Patient instructed to continue to look for SOI (redness, swelling, pain, purulence, fever, chills, nausea, vomiting) and to return to podiatry or the emergency department immediately if there are any SOI.  ---Matrixectomy site  has no SOI today       -If there is no drainage in two days, then she may trhy wearing a toe sleeve since she has had discomfort from the toes rubbing on each other.  ---Dispensed size small toe sleeve. Patient may find these at [facility] or [facility]. Patient should not wear the toe sleeve(s) at night, but only wear the sleeve in shoes and/or socks during the day. The sleeves are re-usable and hand washable until they wear out.    -Patient in agreement with the above treatment plan and all of patient's questions were answered.      Return to clinic as needed      Jessica Godwin DPM

## 2023-07-05 NOTE — PROGRESS NOTES
Otolaryngology Consultation    Patient: Katya Alcala  : 1940    Patient presents with:  Thyroid Problem: Thyroid Nodules   Referral Dr. Taylor    HPI:  Katya Alcala (Cindy) is a 82 year old female seen today for bilateral thyroid nodules found incidentally on CTA    She denies new onset dysphonia, dysphagia or any other compressive symptoms.  She was unaware of the nodules    There is no personal history of high-dose head or neck radiation.    No known family history of thyroid cancer    No new onset tremor or weight loss       Ultrasound dated 2023 for incidental findings of nodules on CTA showed no suspicious cervical adenopathy.    There is a dominant left thyroid nodule measuring 3 cm which is predominantly solid with some cystic component wider than tall.  T RADS 4 otherwise there are bilateral nodules which are subcentimeter including a right 6 mm solid T RADS 5 nodule and a left 7 mm T RADS 5 nodule    CTA showed severe coronary artery disease.  Negative for pulmonary embolism status post aortic valve replacement.  Indeterminant pulmonary nodules        TSH on 2023 was 1.5  Calcium on 2023 was 9.7  Vitamin D on 3/10/2023 was borderline low at 26    Chronically anticoagulated on Eliquis  Significant cardiac history    She notes bilateral ear plugging.  No sudden hearing loss vertigo or otalgia no chronic otorrhea    Current Outpatient Rx   Medication Sig Dispense Refill     allopurinol (ZYLOPRIM) 100 MG tablet Take 100 mg by mouth daily       amLODIPine (NORVASC) 10 MG tablet Take 10 mg by mouth daily       cholecalciferol 50 MCG (2000 UT) CAPS Take 2,000 Units by mouth       ELIQUIS ANTICOAGULANT 2.5 MG tablet        ferrous sulfate (FEROSUL) 325 (65 Fe) MG tablet Take 325 mg by mouth daily       hydrochlorothiazide (HYDRODIURIL) 12.5 MG tablet Take 12.5 mg by mouth daily       isosorbide mononitrate (IMDUR) 60 MG 24 hr tablet        nadolol (CORGARD) 80 MG tablet Take 80 mg by  mouth daily       nitroGLYcerin (NITROSTAT) 0.4 MG sublingual tablet Do not crush. Dissolve 1 tablet under the tongue at 1st sign of angina. Repeat in 5 minutes till relief. Max of 3 tabs/15 minutes.       Omega-3 Fatty Acids (OMEGA-3 CF) 1000 MG CAPS Take 1,000 mg by mouth 2 times daily        omeprazole (PRILOSEC) 20 MG CR capsule Take 20 mg by mouth daily       pravastatin (PRAVACHOL) 10 MG tablet        traMADol (ULTRAM) 50 MG tablet Take 50 mg by mouth every 6 hours as needed for pain       HYDROcodone-acetaminophen (NORCO) 5-325 MG tablet Take 1-2 tablets by mouth every 6 hours as needed for moderate to severe pain (Patient not taking: Reported on 7/6/2023) 120 tablet 0     LORazepam (ATIVAN) 1 MG tablet  (Patient not taking: Reported on 7/6/2023)       nitroFURantoin macrocrystal-monohydrate (MACROBID) 100 MG capsule Take 100 mg by mouth (Patient not taking: Reported on 7/6/2023)       nitroFURantoin macrocrystal-monohydrate (MACROBID) 100 MG capsule  (Patient not taking: Reported on 7/6/2023)         Allergies: Bactrim [sulfamethoxazole-trimethoprim], Atorvastatin, Codeine, Crestor [rosuvastatin], Doxycycline, Penicillins, Prednisone, Prednisone, and Simvastatin     Past Medical History:   Diagnosis Date     Atrial fibrillation, chronic (H) 6/12/2017     Coronary artery disease with stable angina pectoris (H) 2/7/2014    S/p 2 vessel bypass     Essential hypertension 8/25/2006    Overview:  IMO Update     Fibromyalgia 10/20/2005    Overview:      Gout 5/13/2010    Overview:  Updated per 10/1/17 IMO import     Hyperlipidemia 8/25/2006    Overview:  IMO Update 10/11     Hypertension 5/10/2004     Impaired fasting glucose 12/18/2008     Iron deficiency anemia due to chronic blood loss 1/20/2018     Long term (current) use of opiate analgesic 11/21/2017     S/P aortic valve replacement 2/7/2014    Overview:  IMO Update 10/11 Problem list name updated by automated process. Provider to review     S/P thoracic  aortic aneurysm repair 4/22/2009    Overview:  IMO Update 10/11     Status post coronary artery bypass graft 4/22/2009    Overview:  IMO Update 10/11     Tubular adenoma of colon 3/9/2014       Past Surgical History:   Procedure Laterality Date     AORTIC VALVE REPLACEMENT  05/2008    Mitroflow [employer] bioprosthesis     APPENDECTOMY       C OB/GYN PROCEDURE                          DATE:  1986    Vaginal granulation tissue removed     CARDIAC CATHERIZATION  01/04/2016     COLONOSCOPY      and polypectomy     HISTORY OF CABG  05/2008    Single vessel     HISTORY OF CATARACT REPAIR Right 05/09/2011     HISTORY OF THORACIC AORTIC ANEURYSM REPAIR  05/2008    Ascending aorta, tube graft     SIOBHAN/BSO  1984    Benign fibroid     TONSILLECTOMY & ADENOIDECTOMY  1949       ENT family history reviewed    Social History     Tobacco Use     Smoking status: Never     Smokeless tobacco: Never   Substance Use Topics     Alcohol use: No     Drug use: No       Review of Systems  ROS: 10 point ROS neg other than the symptoms noted above in the HPI     Physical Exam  /50   Pulse 56   Temp 97.8  F (36.6  C) (Tympanic)   Resp 18   Ht 1.524 m (5')   Wt 57.6 kg (127 lb)   SpO2 97%   BMI 24.80 kg/m    General - The patient is well nourished and well developed, and appears to have good nutritional status.  Alert and oriented to person and place, answers questions and cooperates with examination appropriately.   Head and Face - Normocephalic and atraumatic, with no gross asymmetry noted.  The facial nerve is intact, with strong symmetric movements.  Voice and Breathing - The patient was breathing comfortably without the use of accessory muscles. There was no wheezing, stridor, or stertor.  The patients voice was clear and strong, and had appropriate pitch and quality.  No diamond peripheral digital clubbing or cyanosis   Ears -On examination of the ears, I found that the ear(s) were completely impacted with dense cerumen.   Therefore, I positioned them in the examination chair in a semi-supine position, beginning with the right side.  I used the binocular surgical microscope to perform cerumen removal.  I began by using a cerumen loop to gently lift the edges of the cerumen mass away from the walls of the external canal.  Once I did this, I was able to suction away fragments of wax and debris using suction.  Once the mass was loose enough, the entire plug was pulled from the canal.  The tympanic membrane was intact, no sign of perforation or middle ear effusion.    I turned my attention to the contralateral side once again using the binocular surgical microscope to perform cerumen removal.  I began by using a cerumen loop to gently lift the edges of the cerumen mass away from the walls of the external canal.  Once I did this, I was able to suction away fragments of wax and debris using suction.  Once the mass was loose enough, the entire plug was pulled from the canal.  The tympanic membrane was intact, no sign of perforation or middle ear effusion.    Eyes - Extraocular movements intact, and the pupils were reactive to light.  Sclera were not icteric or injected, conjunctiva were pink and moist.  Mouth - Examination of the oral cavity showed pink, healthy oral mucosa. No lesions or ulcerations noted.  The tongue was mobile and midline, and the dentition were in good condition.    Throat - The walls of the oropharynx were smooth, pink, moist, symmetric, and had no lesions or ulcerations.  The tonsillar pillars and soft palate were symmetric.  The uvula was midline on elevation.    Neck -left 3.0 cm mobile thyroid nodule.   I cannot palpate any other discrete nodules   No palpable enlarged fixed cervical lymph nodes.  No neck cysts or unusual tenderness to palpation. The trachea is grossly midline.   Nose - External contour is symmetric, no gross deflection or scars.  Nasal mucosa is pink and moist with no abnormal mucus.  The septum  and turbinates were evaluated.  No polyps, masses, or purulence noted on examination.      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 Thyroid nodule  E04.1 US Biopsy Thyroid Fine Needle Aspiration      3. Bilateral impacted cerumen  H61.23             USFNAB LEFT 7 mm and 3.0 cm nodule, RIGHT 6 mm and 1.2 cm nodule  Follow-up accordingly    Pre procedure valium and ativan requested and sent with naloxone  Risks discussed    thyroid anatomy and thyroid surgery was briefly discussed.  Hopefully surgery can be avoided at her age and with her cardiac history.    Verbal and written education on thyroid was discussed  She was concerned she has symptoms of hypothyroidism but was reassured she has a normal TSH    Jenny Torres D.O.  Otolaryngology/Head and Neck Surgery  Allergy

## 2023-07-05 NOTE — PATIENT INSTRUCTIONS
Thank you for allowing Dr. Torres and our ENT team to participate in your care.  If your medications are too expensive, please give the nurse a call.  We can possibly change this medication.  If you have a scheduling or an appointment question please contact our Health Unit Coordinator at their direct line [phone].   ALL nursing questions or concerns can be directed to your ENT nurse at: [phone]   Ordered Ultra Sound Biopsy Fine Needle Aspiration Someone will call you to schedule      Follow-up accordingly

## 2023-07-06 NOTE — LETTER
2023         RE: Katya Alcala  [address]  [address]  [facility] [address]        Dear Colleague,    Thank you for referring your patient, Katya Alcala, to the [facility]. Please see a copy of my visit note below.    Otolaryngology Consultation    Patient: Katya Alcala  : 1940    Patient presents with:  Thyroid Problem: Thyroid Nodules   Referral Dr. Taylor    HPI:  Katya Alcala (Cindy) is a 82 year old female seen today for bilateral thyroid nodules found incidentally on CTA    She denies new onset dysphonia, dysphagia or any other compressive symptoms.  She was unaware of the nodules    There is no personal history of high-dose head or neck radiation.    No known family history of thyroid cancer    No new onset tremor or weight loss       Ultrasound dated 2023 for incidental findings of nodules on CTA showed no suspicious cervical adenopathy.    There is a dominant left thyroid nodule measuring 3 cm which is predominantly solid with some cystic component wider than tall.  T RADS 4 otherwise there are bilateral nodules which are subcentimeter including a right 6 mm solid T RADS 5 nodule and a left 7 mm T RADS 5 nodule    CTA showed severe coronary artery disease.  Negative for pulmonary embolism status post aortic valve replacement.  Indeterminant pulmonary nodules        TSH on 2023 was 1.5  Calcium on 2023 was 9.7  Vitamin D on 3/10/2023 was borderline low at 26    Chronically anticoagulated on Eliquis  Significant cardiac history    She notes bilateral ear plugging.  No sudden hearing loss vertigo or otalgia no chronic otorrhea    Current Outpatient Rx   Medication Sig Dispense Refill     allopurinol (ZYLOPRIM) 100 MG tablet Take 100 mg by mouth daily       amLODIPine (NORVASC) 10 MG tablet Take 10 mg by mouth daily       cholecalciferol 50 MCG ( UT) CAPS Take 2,000 Units by mouth       ELIQUIS ANTICOAGULANT 2.5 MG tablet         ferrous sulfate (FEROSUL) 325 (65 Fe) MG tablet Take 325 mg by mouth daily       hydrochlorothiazide (HYDRODIURIL) 12.5 MG tablet Take 12.5 mg by mouth daily       isosorbide mononitrate (IMDUR) 60 MG 24 hr tablet        nadolol (CORGARD) 80 MG tablet Take 80 mg by mouth daily       nitroGLYcerin (NITROSTAT) 0.4 MG sublingual tablet Do not crush. Dissolve 1 tablet under the tongue at 1st sign of angina. Repeat in 5 minutes till relief. Max of 3 tabs/15 minutes.       Omega-3 Fatty Acids (OMEGA-3 CF) 1000 MG CAPS Take 1,000 mg by mouth 2 times daily        omeprazole (PRILOSEC) 20 MG CR capsule Take 20 mg by mouth daily       pravastatin (PRAVACHOL) 10 MG tablet        traMADol (ULTRAM) 50 MG tablet Take 50 mg by mouth every 6 hours as needed for pain       HYDROcodone-acetaminophen (NORCO) 5-325 MG tablet Take 1-2 tablets by mouth every 6 hours as needed for moderate to severe pain (Patient not taking: Reported on 7/6/2023) 120 tablet 0     LORazepam (ATIVAN) 1 MG tablet  (Patient not taking: Reported on 7/6/2023)       nitroFURantoin macrocrystal-monohydrate (MACROBID) 100 MG capsule Take 100 mg by mouth (Patient not taking: Reported on 7/6/2023)       nitroFURantoin macrocrystal-monohydrate (MACROBID) 100 MG capsule  (Patient not taking: Reported on 7/6/2023)         Allergies: Bactrim [sulfamethoxazole-trimethoprim], Atorvastatin, Codeine, Crestor [rosuvastatin], Doxycycline, Penicillins, Prednisone, Prednisone, and Simvastatin     Past Medical History:   Diagnosis Date     Atrial fibrillation, chronic (H) 6/12/2017     Coronary artery disease with stable angina pectoris (H) 2/7/2014    S/p 2 vessel bypass     Essential hypertension 8/25/2006    Overview:  IMO Update     Fibromyalgia 10/20/2005    Overview:      Gout 5/13/2010    Overview:  Updated per 10/1/17 IMO import     Hyperlipidemia 8/25/2006    Overview:  IMO Update 10/11     Hypertension 5/10/2004     Impaired fasting glucose 12/18/2008     Iron  deficiency anemia due to chronic blood loss 1/20/2018     Long term (current) use of opiate analgesic 11/21/2017     S/P aortic valve replacement 2/7/2014    Overview:  IMO Update 10/11 Problem list name updated by automated process. Provider to review     S/P thoracic aortic aneurysm repair 4/22/2009    Overview:  IMO Update 10/11     Status post coronary artery bypass graft 4/22/2009    Overview:  IMO Update 10/11     Tubular adenoma of colon 3/9/2014       Past Surgical History:   Procedure Laterality Date     AORTIC VALVE REPLACEMENT  05/2008    Mitroflow [employer] bioprosthesis     APPENDECTOMY       C OB/GYN PROCEDURE                          DATE:  1986    Vaginal granulation tissue removed     CARDIAC CATHERIZATION  01/04/2016     COLONOSCOPY      and polypectomy     HISTORY OF CABG  05/2008    Single vessel     HISTORY OF CATARACT REPAIR Right 05/09/2011     HISTORY OF THORACIC AORTIC ANEURYSM REPAIR  05/2008    Ascending aorta, tube graft     SIOBHAN/BSO  1984    Benign fibroid     TONSILLECTOMY & ADENOIDECTOMY  1949       ENT family history reviewed    Social History     Tobacco Use     Smoking status: Never     Smokeless tobacco: Never   Substance Use Topics     Alcohol use: No     Drug use: No       Review of Systems  ROS: 10 point ROS neg other than the symptoms noted above in the HPI     Physical Exam  /50   Pulse 56   Temp 97.8  F (36.6  C) (Tympanic)   Resp 18   Ht 1.524 m (5')   Wt 57.6 kg (127 lb)   SpO2 97%   BMI 24.80 kg/m    General - The patient is well nourished and well developed, and appears to have good nutritional status.  Alert and oriented to person and place, answers questions and cooperates with examination appropriately.   Head and Face - Normocephalic and atraumatic, with no gross asymmetry noted.  The facial nerve is intact, with strong symmetric movements.  Voice and Breathing - The patient was breathing comfortably without the use of accessory muscles. There was no  wheezing, stridor, or stertor.  The patients voice was clear and strong, and had appropriate pitch and quality.  No diamond peripheral digital clubbing or cyanosis   Ears -On examination of the ears, I found that the ear(s) were completely impacted with dense cerumen.  Therefore, I positioned them in the examination chair in a semi-supine position, beginning with the right side.  I used the binocular surgical microscope to perform cerumen removal.  I began by using a cerumen loop to gently lift the edges of the cerumen mass away from the walls of the external canal.  Once I did this, I was able to suction away fragments of wax and debris using suction.  Once the mass was loose enough, the entire plug was pulled from the canal.  The tympanic membrane was intact, no sign of perforation or middle ear effusion.    I turned my attention to the contralateral side once again using the binocular surgical microscope to perform cerumen removal.  I began by using a cerumen loop to gently lift the edges of the cerumen mass away from the walls of the external canal.  Once I did this, I was able to suction away fragments of wax and debris using suction.  Once the mass was loose enough, the entire plug was pulled from the canal.  The tympanic membrane was intact, no sign of perforation or middle ear effusion.    Eyes - Extraocular movements intact, and the pupils were reactive to light.  Sclera were not icteric or injected, conjunctiva were pink and moist.  Mouth - Examination of the oral cavity showed pink, healthy oral mucosa. No lesions or ulcerations noted.  The tongue was mobile and midline, and the dentition were in good condition.    Throat - The walls of the oropharynx were smooth, pink, moist, symmetric, and had no lesions or ulcerations.  The tonsillar pillars and soft palate were symmetric.  The uvula was midline on elevation.    Neck -left 3.0 cm mobile thyroid nodule.   I cannot palpate any other discrete nodules   No  palpable enlarged fixed cervical lymph nodes.  No neck cysts or unusual tenderness to palpation. The trachea is grossly midline.   Nose - External contour is symmetric, no gross deflection or scars.  Nasal mucosa is pink and moist with no abnormal mucus.  The septum and turbinates were evaluated.  No polyps, masses, or purulence noted on examination.      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 Thyroid nodule  E04.1 US Biopsy Thyroid Fine Needle Aspiration      3. Bilateral impacted cerumen  H61.23             USFNAB LEFT 7 mm and 3.0 cm nodule, RIGHT 6 mm and 1.2 cm nodule  Follow-up accordingly    Pre procedure valium and ativan requested and sent with naloxone  Risks discussed    thyroid anatomy and thyroid surgery was briefly discussed.  Hopefully surgery can be avoided at her age and with her cardiac history.    Verbal and written education on thyroid was discussed  She was concerned she has symptoms of hypothyroidism but was reassured she has a normal TSH    Jenny Torres D.O.  Otolaryngology/Head and Neck Surgery  Allergy        Again, thank you for allowing me to participate in the care of your patient.        Sincerely,        Jenny Torres MD

## 2023-07-12 NOTE — TELEPHONE ENCOUNTER
"Per Abby Bella via Secure Chat: Dr Torres asked me to send this to you. \"Just to inform yo that the radiologist (Dr Milton) looked at MRN [id number] and he wants to do the left side nodules first and then in the future do the right side if you would still like it. Can you put in a new order for the left side and the right side as separate orders. Thanks.\"    Verified with Abby that the Radiologist wants a US Thyroid ordered for Right and Left side separately. Orders placed and pended.     "

## 2023-07-13 NOTE — PATIENT INSTRUCTIONS
Pt called and scheduled for left thyroid biopsy on 7/19 with an arrival time at 1100 for an 1130 procedure. Pt is aware that we will only be doing the left side due to radiologist only wanting to do one side at a time. Pt is on eliquis and will stop taking after Sunday 7/16/23 dose.  Pt was given a ativan order by dr. Torres and will take at home prior to coming, will come with a . Pt agrees to instructions. Instructed to call with further questions.

## 2023-07-18 NOTE — PROVIDER NOTIFICATION
Pt reminded of appointment tomorrow. Pt stated she was given a medication to help with procedure anxiety. Pt informed to arrive by 1045 and we will do consent with pt for procedure before she takes the anxiety medication. Pt verbalized understanding.

## 2023-07-19 NOTE — PROGRESS NOTES
"Pt stated she felt \"goofy\", \"loopy\" as soon as procedure ended but stated \"it didn't work during procedure.\" Pt given snack of Boost and [place]. After eating snack pt stated she felt much better. Pt's friend stated she would stay with pt until pt's son stops at 1330 to be with pt at home. Pt agreed to right thyroid biopsy next Wednesday. Instructed pt that we will call her to confirm time.  "

## 2023-07-19 NOTE — IP AVS SNAPSHOT
After Visit Summary Template Not Found    This Print Group is only intended to be used in the After Visit Summary and can only be used in a report that uses a released After Visit Summary Template.                       MRN:[id number]                      After Visit Summary   7/19/2023    Katya Alcala   MRN: [id number]           Visit Information        Provider Department      7/19/2023 11:30 AM HIXRRN; HII[facility]AD; [facility]           Review of your medicines       Notice    This visit is during an admission. Changes to the med list made in this visit will be reflected in the After Visit Summary of the admission.           Protect others around you: Learn how to safely use, store and throw away your medicines at www.disposemymeds.org.       Follow-ups after your visit       Your next 10 appointments already scheduled    Jul 19, 2023 11:30 AM  (Arrive by 11:00 AM)  US BIOPSY THYROID FINE NEEDLE ASPIRATION with HIUS1, HIIRRAD, HIXRRN  [facility] ([address] [address]  [phone]   How do I prepare for my exam? (Food and drink instructions)  No Food and Drink Restrictions.    How do I prepare for my exam? (Other instructions)  IF YOUR DOCTOR ALSO PRESCRIBED SEDATION DURING THE EXAM (medicine to help you relax): You will receive separate instructions about driving, eating, and additional tests that may be necessary prior to your exam day.    What should I wear: Wear comfortable clothes.    How long does the exam take: Aspiration (no sedation treatment takes about an hour).  For paracentesis, thoracentesis or sedation plan to spend at least three hours at the hospital.    What should I bring: Bring a list of your medicines, including vitamins, minerals and over-the-counter drugs. It is safest to leave personal items at home. Bring your 's license or photo ID and your insurance card. For the safety of your family, please do not bring any  children with you unless they are accompanied by another adult who can watch them during your imaging exam.    Do I need a :  No  is needed.    What do I need to tell my doctor:  Tell your doctor in advance:  * If you are or may be pregnant.  * If you are taking Coumadin (or any other blood thinners) 5 days prior to the exam for any special instructions.  * If you are diabetic to determine if your insulin needs have to be adjusted for the exam.    What should I do after the exam: Take it easy the rest of the day. You can return to normal activities the next day.    What is this test: This test uses a long, thin needle or tube to remove tissue, fluid, or other cells from your body. Pictures from an ultrasound will guide the needle to the right place. (Ultrasound uses sound waves to create pictures of the body on a video screen. You will not feel the sound waves.)    * A biopsy removes tissue or other cells from the body; we send the tissue or cells to a lab for testing.  * Paracentesis removes fluid from the belly (abdomen) to relieve pressure, to test the fluid or both.  * Thoracentesis removes fluid from the sac around the lungs to relieve pressure, to test the fluid or both.  * Aspiration removes fluid from any part of the body, then the fluid is tested for disease or infection.    Who should I call with questions: If you have any questions, please call the Imaging Department where you will have your exam. Directions, parking instructions, and other information are available on our website, [email or web address].        Care Instructions       Further instructions from your care team           DISCHARGE INSTRUCTIONS  Needle Aspiration      IMPORTANT: As you prepare for discharge, the following information will help you return to your best level of health.               This Information Is About Your Follow Up Care     Call your doctor if you do not get better. Call sooner if you feel worse. You can  "reach your doctor by calling their clinic phone number.                                    This Information Is About Procedures      NEEDLE ASPIRATION  You have had a needle aspiration.  A needle aspiration (also called a \"fine needle biopsy\") is a procedure used to take a sample of cells or tissues from a lump or mass or other area of concern.  The sample of cells taken during the procedure can then be checked in the lab under a microscope to find out if there is cancer or other diseases.   Sometimes a needle aspiration is done to check the effect of treatment on a known tumor.    When you go home, follow these instructions:  Check the site frequently for bleeding, swelling, redness, or drainage.  Use an ice pack at the site for 20 minutes at a time if needed for pain.  Rest for the remainder of the day.  Don't drive for 24 hours if you received medicine to relax you during the procedure.  Return to your usual diet.  Do not take aspirin or anti-inflammatory medicines like ibuprofen or naproxen (check with your doctor if you take aspirin for heart disease or stroke).  You may take acetaminophen (Tylenol) for pain if needed.  Do not take blood thinner medicines until your doctor tells you to restart them.    Call your doctor if you have:  Continued bleeding  Swelling or a lump at the needle site  Pain not made better by acetaminophen  Fever or chills  Redness or drainage from the needle site  Chest pain or trouble breathing  Any questions or concerns                      Additional Information About Your Visit       Care EveryWhere ID    This is your Care EveryWhere ID. This could be used by other organizations to access your [facility] medical records  [id number]        Primary Care Provider  Lokesh Taylor MD Office Phone #  [phone] Fax #  [phone]      Equal Access to Services    KESHAV SUN AH: anoop Barrios, yolanda patel " kamilahjaynesoledad medellin ah. So [facility] [phone].    ATENCIÓN: Si mengla awilda, tiene a pastrana disposición servicios gratuitos de asistencia lingüística. Demarco al [phone].    We comply with applicable federal and state civil rights laws, including the [place] Human Rights Act. We do not discriminate on the basis of race, color, creed, Bahai, national origin, marital status, age, disability, sex, sexual orientation, or gender identity.    If you would like an itemization of your charges they will now be available in [employer] 30 days after discharge. To access the itemized statements in [employer] go to billing/billing summary. From there select view account. There will be multiple tabs showing an overview of your account, detail, payments, and communications. From the communications tab you can see your monthly statements, your itemized statements, and any billing letters generated for your account. If you do not have a [employer] account and need help getting access please contact [employer] support at [phone].  If you would prefer to have your itemized statements mailed please contact our automated itemized bill request line at [phone] option  2.       Thank you!    Thank you for choosing [facility] for your care. Our goal is always to provide you with excellent care. Hearing back from our patients is one way we can continue to improve our services. Please take a few minutes to complete the written survey that you may receive in the mail after you visit with us. Thank you!         Medication List      Notice    This visit is during an admission. Changes to the med list made in this visit will be reflected in the After Visit Summary of the admission.

## 2023-07-19 NOTE — IP AVS SNAPSHOT
[facility]  [address] [place] [id number]  Phone: [phone]                              After Visit Summary   7/19/2023    Katya Alcala   MRN: [id number]           After Visit Summary Signature Page    I have received my discharge instructions, and my questions have been answered. I have discussed any challenges I see with this plan with the nurse or doctor.    ..........................................................................................................................................  Patient/Patient Representative Signature      ..........................................................................................................................................  Patient Representative Print Name and Relationship to Patient    ..................................................               ................................................  Date                                   Time    ..........................................................................................................................................  Reviewed by Signature/Title    ...................................................              ..............................................  Date                                               Time    22EPIC Rev 08/18

## 2023-07-19 NOTE — PROGRESS NOTES
Procedure: Fine Needle Biopsy, Thyroid, left, one nodule.    There were  no complications and patient has no symptoms..      Tolerated procedure well.    Patient to US.  Consent complete.  Supine on Ultrasound table.      Radiologist:Dr Milton    Time Out: Prior to the start of the procedure and with procedural staff participation, I verbally confirmed the patient s identity using two indicators, relevant allergies, that the procedure was appropriate and matched the consent or emergent situation, and that the correct equipment/implants were available. Immediately prior to starting the procedure I conducted the Time Out with the procedural staff and re-confirmed the patient s name, procedure, and site/side. (The Joint Commission universal protocol was followed.)  Yes   One nodule biopsy, with pathology it was determined lower left nodule would not be biopsied at this time.  Pt given discharged instruction and verbally instructed. Pt verbalized understanding.    Position:  supine    Pain:  6    Sedation:None. Local Anesthestic used  No sedation    Estimated Blood Loss: Minimal     Condition: Stable    Comments: See dictated procedure note for full details     Abby Bella RN

## 2023-07-19 NOTE — PROGRESS NOTES
"Pt here for left thyroid FNA. Pt requesting to take her anti-anxiety medication she was prescribe for this procedure. Pt brought into US room 1 and Dr Milton went over procedure instructions and consent was signed with pt and the pt took 1mg of lorazepam and 1 norco as ordered PTA. Pt assisted back to waiting room with friend. Pt stated \"I have to wait at least 30 minutes.\" Pt states she is very anxious about procedure.   "

## 2023-07-20 NOTE — PROVIDER NOTIFICATION
"Talked with pt day after procedure. Pt states no fevers or discharge. Stated neck feels \" a little sore.\" Pt instructed on upcoming right biopsy time 7/26 arrival at 1100. Pt stated she feels the anxiety meds again. Instructed pt that she I felt she did a wonderful job and that she felt \"goofy\" after. Pt stated \"I did find during the procedure because of the medications I took and I will have someone stay with me after the procedure.\" Pt instructed to come by 1045 to sign consent before taking anxiety medication.  "

## 2023-07-24 NOTE — TELEPHONE ENCOUNTER
Katya needs lorazapam and a pain pill for her MRI on Wed sent to [facility] at [facility]. Dr Torres prescribed these last time.

## 2023-07-24 NOTE — TELEPHONE ENCOUNTER
Patient calling to check on status of requested medications  Biopsy is scheduled for this Wed. 7/26  Writer attached both previous med orders for review

## 2023-07-28 NOTE — IP AVS SNAPSHOT
After Visit Summary Template Not Found    This Print Group is only intended to be used in the After Visit Summary and can only be used in a report that uses a released After Visit Summary Template.                       MRN:[id number]                      After Visit Summary   7/28/2023    Katya Alcala   MRN: [id number]           Visit Information        Provider Department      7/28/2023 11:30 AM HIXRRN; HIIRRAD; HIUS1 [facility] ULTRAS[facility]           Review of your medicines       Notice    This visit is during an admission. Changes to the med list made in this visit will be reflected in the After Visit Summary of the admission.           Protect others around you: Learn how to safely use, store and throw away your medicines at www.disposemymeds.org.       Follow-ups after your visit       Your next 10 appointments already scheduled    Jul 28, 2023 11:30 AM  (Arrive by 11:00 AM)  US BIOPSY THYROID FINE NEEDLE ASPIRATION with HIUS1, HIIRRAD, HIXRRN  HI [facility] ([address] [address]  [phone]   How do I prepare for my exam? (Food and drink instructions)  No Food and Drink Restrictions.    How do I prepare for my exam? (Other instructions)  IF YOUR DOCTOR ALSO PRESCRIBED SEDATION DURING THE EXAM (medicine to help you relax): You will receive separate instructions about driving, eating, and additional tests that may be necessary prior to your exam day.    What should I wear: Wear comfortable clothes.    How long does the exam take: Aspiration (no sedation treatment takes about an hour).  For paracentesis, thoracentesis or sedation plan to spend at least three hours at the hospital.    What should I bring: Bring a list of your medicines, including vitamins, minerals and over-the-counter drugs. It is safest to leave personal items at home. Bring your 's license or photo ID and your insurance card. For the safety of your family, please do not bring any  children with you unless they are accompanied by another adult who can watch them during your imaging exam.    Do I need a :  No  is needed.    What do I need to tell my doctor:  Tell your doctor in advance:  * If you are or may be pregnant.  * If you are taking Coumadin (or any other blood thinners) 5 days prior to the exam for any special instructions.  * If you are diabetic to determine if your insulin needs have to be adjusted for the exam.    What should I do after the exam: Take it easy the rest of the day. You can return to normal activities the next day.    What is this test: This test uses a long, thin needle or tube to remove tissue, fluid, or other cells from your body. Pictures from an ultrasound will guide the needle to the right place. (Ultrasound uses sound waves to create pictures of the body on a video screen. You will not feel the sound waves.)    * A biopsy removes tissue or other cells from the body; we send the tissue or cells to a lab for testing.  * Paracentesis removes fluid from the belly (abdomen) to relieve pressure, to test the fluid or both.  * Thoracentesis removes fluid from the sac around the lungs to relieve pressure, to test the fluid or both.  * Aspiration removes fluid from any part of the body, then the fluid is tested for disease or infection.    Who should I call with questions: If you have any questions, please call the Imaging Department where you will have your exam. Directions, parking instructions, and other information are available on our website, [email or web address].        Care Instructions       Further instructions from your care team           DISCHARGE INSTRUCTIONS  Needle Aspiration      IMPORTANT: As you prepare for discharge, the following information will help you return to your best level of health.               This Information Is About Your Follow Up Care     Call your doctor if you do not get better. Call sooner if you feel worse. You can  "reach your doctor by calling their clinic phone number.                                    This Information Is About Procedures      NEEDLE ASPIRATION  You have had a needle aspiration.  A needle aspiration (also called a \"fine needle biopsy\") is a procedure used to take a sample of cells or tissues from a lump or mass or other area of concern.  The sample of cells taken during the procedure can then be checked in the lab under a microscope to find out if there is cancer or other diseases.   Sometimes a needle aspiration is done to check the effect of treatment on a known tumor.    When you go home, follow these instructions:  Check the site frequently for bleeding, swelling, redness, or drainage.  Use an ice pack at the site for 20 minutes at a time if needed for pain.  Rest for the remainder of the day.  Don't drive for 24 hours if you received medicine to relax you during the procedure.  Return to your usual diet.  Do not take aspirin or anti-inflammatory medicines like ibuprofen or naproxen (check with your doctor if you take aspirin for heart disease or stroke).  You may take acetaminophen (Tylenol) for pain if needed.  Do not take blood thinner medicines until your doctor tells you to restart them.    Call your doctor if you have:  Continued bleeding  Swelling or a lump at the needle site  Pain not made better by acetaminophen  Fever or chills  Redness or drainage from the needle site  Chest pain or trouble breathing  Any questions or concerns                      Information about OPIOIDS    PRESCRIPTION OPIOIDS: WHAT YOU NEED TO KNOW   We gave you an opioid (narcotic) pain medicine. It is important to manage your pain, but opioids are not always the best choice. You should first try all the other options your care team gave you. Take this medicine for as short a time (and as few doses) as possible.    Some activities can increase your pain, such as bandage changes or therapy sessions. It may help to take " your pain medicine 30 to 60 minutes before these activities. Reduce your stress by getting enough sleep, working on hobbies you enjoy and practicing relaxation or meditation. Talk to your care team about ways to manage your pain beyond prescription opioids.    These medicines have risks:    DO NOT drive when on new or higher doses of pain medicine. These medicines can affect your alertness and reaction times, and you could be arrested for driving under the influence (DUI). If you need to use opioids long-term, talk to your care team about driving.    DO NOT operate heavy machinery    DO NOT do any other dangerous activities while taking these medicines.    DO NOT drink any alcohol while taking these medicines.     If the opioid prescribed includes acetaminophen, DO NOT take with any other medicines that contain acetaminophen. Read all labels carefully. Look for the word  acetaminophen  or  Tylenol.  Ask your pharmacist if you have questions or are unsure.    You can get addicted to pain medicines, especially if you have a history of addiction (chemical, alcohol or substance dependence). Talk to your care team about ways to reduce this risk.    All opioids tend to cause constipation. Drink plenty of water and eat foods that have a lot of fiber, such as fruits, vegetables, prune juice, apple juice and high-fiber cereal. Take a laxative (Miralax, milk of magnesia, Colace, Senna) if you don t move your bowels at least every other day. Other side effects include upset stomach, sleepiness, dizziness, throwing up, tolerance (needing more of the medicine to have the same effect), physical dependence and slowed breathing.    Store your pills in a secure place, locked if possible. We will not replace any lost or stolen medicine. If you don t finish your medicine, please throw away (dispose) as directed by your pharmacist. Medication waste collection kiosks are conveniently located at all [facility] retail  pharmacy locations.  The [place] Pollution Control Agency has more information about safe disposal: https://www.pca.[facility].mn.us/living-green/managing-unwanted-medications       Additional Information About Your Visit       Care EveryWhere ID    This is your Care EveryWhere ID. This could be used by other organizations to access your [facility] medical records  [id number]       Your Vitals Were  Most recent update: 7/28/2023 11:13 AM    Blood Pressure   153/92 (BP Location: Left arm)    Pulse   64    Pulse Oximetry   96%            Primary Care Provider  Lokesh Taylor MD Office Phone #  [phone] Fax #  [phone]      Equal Access to Services    [facility]: Hadii aad ku hadasho Soomaali, waaxda luqadaha, qaybta kaalmada adeegyada, waxay hayden hayaan adeeg cherise laarely ah. So [facility] [phone].    ATENCIÓN: Si habla español, tiene a pastrana disposición servicios gratuitos de asistencia lingüística. Llame al [phone].    We comply with applicable federal and state civil rights laws, including the [place] Human Rights Act. We do not discriminate on the basis of race, color, creed, Jain, national origin, marital status, age, disability, sex, sexual orientation, or gender identity.    If you would like an itemization of your charges they will now be available in [employer] 30 days after discharge. To access the itemized statements in [employer] go to billing/billing summary. From there select view account. There will be multiple tabs showing an overview of your account, detail, payments, and communications. From the communications tab you can see your monthly statements, your itemized statements, and any billing letters generated for your account. If you do not have a [employer] account and need help getting access please contact [employer] support at [phone].  If you would prefer to have your itemized statements mailed please contact our automated itemized bill request line at [phone] option  2.        Thank you!    Thank you for choosing [facility] for your care. Our goal is always to provide you with excellent care. Hearing back from our patients is one way we can continue to improve our services. Please take a few minutes to complete the written survey that you may receive in the mail after you visit with us. Thank you!         Medication List      Notice    This visit is during an admission. Changes to the med list made in this visit will be reflected in the After Visit Summary of the admission.

## 2023-07-28 NOTE — PROGRESS NOTES
Procedure: Fine Needle Biopsy, Thyroid, right    There were  no complications and patient has no symptoms..      Tolerated procedure well.    Patient to US.  Consent complete.  Supine on Ultrasound table.      Radiologist:Dr Cortez    Time Out: Prior to the start of the procedure and with procedural staff participation, I verbally confirmed the patient s identity using two indicators, relevant allergies, that the procedure was appropriate and matched the consent or emergent situation, and that the correct equipment/implants were available. Immediately prior to starting the procedure I conducted the Time Out with the procedural staff and re-confirmed the patient s name, procedure, and site/side. (The Joint Commission universal protocol was followed.)  Yes    Position:  supine    Pain:  3    Sedation:None. Local Anesthestic used  No sedation    Estimated Blood Loss: Minimal     Condition: Stable    Comments: See dictated procedure note for full details     Abby Bella RN

## 2023-07-28 NOTE — IP AVS SNAPSHOT
[facility]  [address] [place] [id number]  Phone: [phone]                              After Visit Summary   7/28/2023    Katya Alcala   MRN: [id number]           After Visit Summary Signature Page    I have received my discharge instructions, and my questions have been answered. I have discussed any challenges I see with this plan with the nurse or doctor.    ..........................................................................................................................................  Patient/Patient Representative Signature      ..........................................................................................................................................  Patient Representative Print Name and Relationship to Patient    ..................................................               ................................................  Date                                   Time    ..........................................................................................................................................  Reviewed by Signature/Title    ...................................................              ..............................................  Date                                               Time    22EPIC Rev 08/18

## 2023-07-31 NOTE — TELEPHONE ENCOUNTER
Called patient to follow up with how she is feeling after her thyroid biopsy Friday. No answer so message was left, instructed to call us if she has questions or concerns. Phone number also left.

## 2023-08-08 NOTE — TELEPHONE ENCOUNTER
----- Message from Jenny Torres MD sent at 8/8/2023  1:19 PM CDT -----  Can't tell if she has already been called with results?  Please call, benign  Recommend repeat thyroid US in 6 months , order by Debbie or Mckayla

## 2023-11-07 PROBLEM — Z95.2 PRESENCE OF PROSTHETIC HEART VALVE: Status: ACTIVE | Noted: 2019-07-12

## 2023-11-07 PROBLEM — N18.30 STAGE 3 CHRONIC KIDNEY DISEASE (H): Status: ACTIVE | Noted: 2021-03-11

## 2023-11-08 NOTE — PHARMACY-VANCOMYCIN DOSING SERVICE
Pharmacy consulted to dose vancomycin one time in the ED. 1250 mg (~22 mg/kg x 56 kg) x 1 dose ordered. If patient is admitted and further therapy is desired, hospitalist please order consult for pharmacy to dose vancomycin.

## 2023-11-08 NOTE — ED NOTES
Up to the bathroom with stand by assist. Declines the need for pain medication at this time. HR 80s regular on the monitor.

## 2023-11-08 NOTE — ED NOTES
[facility] the rec[facility]ck [facility] [place] [facility] Waiting to hear back.

## 2023-11-08 NOTE — ED NOTES
Patient presents to ER for evaluation after increase in abdominal pain and associated flank pain. She had ERCP yesterday and called her surgeon today with the increase in pain rating it 8/10    Unable to void at this time.

## 2023-11-08 NOTE — ED PROVIDER NOTES
History     Chief Complaint   Patient presents with    Abdominal Pain     HPI  Katya Alcala is a 83 year old individual with history of coronary artery disease, atrial fibrillation on anticoagulation with apixaban, hypertension, fibromyalgia, stage III chronic kidney disease, prosthetic heart valve, comes in for abdominal pain.  Patient had EUS and ERCP conducted at the [facility] yesterday (11/6/2023).  Patient had choledocholithiasis with black stone which was removed by biliary sphincterectomy and balloon extraction.  Temporary plastic pancreatic stent was placed into the ventral pancreatic duct to prevent post ERCP pancreatitis.  Patient states went home.  Today started developed lower abdominal pain that is severe.  States that she called her doctor and was advised to come in.  Patient denies fever or chills.  Denies nausea or vomiting.  States that abdomen pain is severe and points to the umbilical area.  Denies any dysuria or hematuria but states has had decreased urine output.  Denies any melena or hematochezia.  No diarrhea or constipation reported.  States it has been a few days since her last BM.  Denies any cough or shortness of breath.      Allergies:  Allergies   Allergen Reactions    Bactrim [Sulfamethoxazole-Trimethoprim] Other (See Comments)     Renal failure, hyperkalemia    Atorvastatin Other (See Comments)     muscle aches    Codeine GI Disturbance     Causes GI upset (scanned record).    Crestor [Rosuvastatin] Muscle Pain (Myalgia)    Doxycycline      nausea    Penicillins      Distant history of a rash  Injection site reaction    Prednisone Other (See Comments)     Can't sleep    Prednisone Other (See Comments)     insomnia    Simvastatin Muscle Pain (Myalgia)       Problem List:    Patient Active Problem List    Diagnosis Date Noted    Stage 3 chronic kidney disease (H) 03/11/2021     Priority: Medium    Presence of prosthetic heart valve 07/12/2019     Priority: Medium    Hyperkalemia  10/31/2018     Priority: Medium    Head injury 09/09/2018     Priority: Medium    Hypomagnesemia 09/09/2018     Priority: Medium    Iron deficiency anemia due to chronic blood loss 01/20/2018     Priority: Medium    Long term (current) use of opiate analgesic 11/21/2017     Priority: Medium    Atrial fibrillation, chronic (H) 06/12/2017     Priority: Medium    Tubular adenoma of colon 03/09/2014     Priority: Medium    Coronary artery disease with stable angina pectoris (H24) 02/07/2014     Priority: Medium     S/p 2 vessel bypass      S/P aortic valve replacement 02/07/2014     Priority: Medium     Overview:   IMO Update 10/11  Problem list name updated by automated process. Provider to review      Lower GI bleeding 02/07/2014     Priority: Medium    Gout 05/13/2010     Priority: Medium     Overview:   Updated per 10/1/17 IMO import      Status post coronary artery bypass graft 04/22/2009     Priority: Medium     Overview:   IMO Update 10/11      S/P thoracic aortic aneurysm repair 04/22/2009     Priority: Medium     Overview:   IMO Update 10/11      Impaired fasting glucose 12/18/2008     Priority: Medium    Atherosclerosis of autologous artery coronary artery bypass graft with unstable angina pectoris (H) 03/12/2007     Priority: Medium     Formatting of this note might be different from the original. last cath 1/4/16      Essential hypertension 08/25/2006     Priority: Medium     Overview:   IMO Update      Hyperlipidemia 08/25/2006     Priority: Medium     Overview:   IMO Update 10/11      Fibromyalgia 10/20/2005     Priority: Medium     Overview:           Past Medical History:    Past Medical History:   Diagnosis Date    Atrial fibrillation, chronic (H) 6/12/2017    Coronary artery disease with stable angina pectoris (H) 2/7/2014    Essential hypertension 8/25/2006    Fibromyalgia 10/20/2005    Gout 5/13/2010    Hyperlipidemia 8/25/2006    Hypertension 5/10/2004    Impaired fasting glucose 12/18/2008    Iron  deficiency anemia due to chronic blood loss 1/20/2018    Long term (current) use of opiate analgesic 11/21/2017    S/P aortic valve replacement 2/7/2014    S/P thoracic aortic aneurysm repair 4/22/2009    Status post coronary artery bypass graft 4/22/2009    Tubular adenoma of colon 3/9/2014       Past Surgical History:    Past Surgical History:   Procedure Laterality Date    AORTIC VALVE REPLACEMENT  05/2008    Mitroflow [employer] bioprosthesis    APPENDECTOMY      C OB/GYN PROCEDURE                          DATE:  1986    Vaginal granulation tissue removed    CARDIAC CATHERIZATION  01/04/2016    COLONOSCOPY      and polypectomy    HISTORY OF CABG  05/2008    Single vessel    HISTORY OF CATARACT REPAIR Right 05/09/2011    HISTORY OF THORACIC AORTIC ANEURYSM REPAIR  05/2008    Ascending aorta, tube graft    SIOBHAN/BSO  1984    Benign fibroid    TONSILLECTOMY & ADENOIDECTOMY  1949       Family History:    Family History   Problem Relation Age of Onset    Diabetes Mother     Myocardial Infarction Father         50s    Kidney Cancer Brother     Diabetes Son        Social History:  Marital Status:   [5]  Social History     Tobacco Use    Smoking status: Never    Smokeless tobacco: Never   Substance Use Topics    Alcohol use: No    Drug use: No        Medications:    allopurinol (ZYLOPRIM) 100 MG tablet  amLODIPine (NORVASC) 10 MG tablet  ELIQUIS ANTICOAGULANT 2.5 MG tablet  ferrous sulfate (FEROSUL) 325 (65 Fe) MG tablet  isosorbide mononitrate (IMDUR) 60 MG 24 hr tablet  LORazepam (ATIVAN) 0.5 MG tablet  nadolol (CORGARD) 80 MG tablet  Omega-3 Fatty Acids (OMEGA-3 CF) 1000 MG CAPS  omeprazole (PRILOSEC) 20 MG CR capsule  pravastatin (PRAVACHOL) 10 MG tablet  traMADol (ULTRAM) 50 MG tablet  cholecalciferol 50 MCG (2000 UT) CAPS  hydrochlorothiazide (HYDRODIURIL) 12.5 MG tablet  HYDROcodone-acetaminophen (NORCO)  MG per tablet  naloxone (NARCAN) 4 MG/0.1ML nasal spray  nitroGLYcerin (NITROSTAT) 0.4 MG  sublingual tablet          Review of Systems   Constitutional: Negative.    HENT: Negative.     Eyes: Negative.    Respiratory: Negative.     Cardiovascular: Negative.    Gastrointestinal:  Positive for abdominal pain (Near umbilicus.) and constipation. Negative for blood in stool, diarrhea, nausea and vomiting.   Endocrine: Negative.    Genitourinary: Negative.    Musculoskeletal: Negative.    Skin: Negative.    Allergic/Immunologic: Negative.    Neurological: Negative.    Hematological: Negative.    Psychiatric/Behavioral: Negative.         Physical Exam   BP: 110/63  Pulse: 85  Temp: 99.1  F (37.3  C)  Resp: 16  Height: 152.4 cm (5')  Weight: 56.2 kg (124 lb)  SpO2: 93 %      GENERAL APPEARANCE:  The patient is a 83 year old well-developed, well-nourished individual who is in abdominal guarding position upon arrival.  LUNGS:  Breathing is easy.  Breath sounds are equal and clear bilaterally.  No wheezes, rhonchi, or rales.  HEART: Regular rate with irregular rhythm.  Grade 1/6 systolic murmur noted upon auscultation.  ABDOMEN:  Soft and rotund.  No mass or rebound.  Generalized tenderness and guarding to palpation with periumbilical area being worst.  No organomegaly or hernia.  Bowel sounds are present.  No CVA tenderness or flank mass.  No abdominal bruits or thrills present upon auscultation/palpation.  GENITOURINARY: No anterior pelvic tenderness, hernia, mass noted upon palpation.  NEUROLOGIC:  No focal sensory or motor deficits are noted.    PSYCHIATRIC:  The patient is awake, alert, and oriented x4.  Recent and remote memory is intact.  Appropriate mood and affect.  Calm and cooperative with history and physical exam.  SKIN:  Warm, dry, and well perfused.  Good turgor.  No lesions, nodules, or rashes are noted.  No bruising noted.      Comment: Discrepancies between my note and notes on behalf of the nursing team or other care providers are secondary to my findings reflecting my physical examination and  "questioning of the patient.  Any conflicting information provided is not in line with my examination of the patient.       ED Course              ED Course as of 11/07/23 2321 Tue Nov 07, 2023   1820 Labs ordered while patient in lobby.   1849 WBC(!): 21.5  Further sepsis work-up ordered due to elevated white count of 21,500.  1 L LR ordered.  CT of abdomen pelvis with IV contrast ordered.   1850 In to see patient and history/physical completed.    2055 Received call from radiologist with critical results of pneumoperitoneum from possible common bile duct perforation or bowel perforation due to recent ERCP versus perforated gastric/duodenal ulcer or tumor rupture.   2112 Contacted [place] for transfer as patient has surgery there yesterday.  Will call back.   2115 Due to recent surgery and elevated white count of 21.5 and procalcitonin of 0.36, concerns for bowel perforation.  Initiated antibiotics consisting of cefepime and 2 g, metronidazole 500 mg, and vancomycin.   2223 Received call from [place].  General surgeon \"Dr. Marroquin and surgery to call back in about 20 minutes\".   2314 Patient developing nausea.  Ondansetron ordered.  Patient is starting to have slight desaturation to 89% from 93%.  O2 2 L applied.  Lung sounds clear.  Patient in no respiratory distress.   2319 No callback received at this time.  Handoff given to oncoming shift.               Results for orders placed or performed during the hospital encounter of 11/07/23 (from the past 24 hour(s))   [facility] Draw *Canceled*    Narrative    The following orders were created for panel order [facility] Draw.  Procedure                               Abnormality         Status                     ---------                               -----------         ------                       Please view results for these tests on the individual orders.   CBC with Platelets & Differential    Narrative    The following orders were created for panel order CBC with Platelets & " Differential.  Procedure                               Abnormality         Status                     ---------                               -----------         ------                     CBC with platelets and d...[[id number]]  Abnormal            Final result                 Please view results for these tests on the individual orders.   Comprehensive metabolic panel   Result Value Ref Range    Sodium 138 135 - 145 mmol/L    Potassium 4.4 3.4 - 5.3 mmol/L    Carbon Dioxide (CO2) 24 22 - 29 mmol/L    Anion Gap 13 7 - 15 mmol/L    Urea Nitrogen 30.7 (H) 8.0 - 23.0 mg/dL    Creatinine 1.36 (H) 0.51 - 0.95 mg/dL    GFR Estimate 39 (L) >60 mL/min/1.73m2    Calcium 9.6 8.8 - 10.2 mg/dL    Chloride 101 98 - 107 mmol/L    Glucose 145 (H) 70 - 99 mg/dL    Alkaline Phosphatase 93 35 - 104 U/L    AST 19 0 - 45 U/L    ALT 8 0 - 50 U/L    Protein Total 6.9 6.4 - 8.3 g/dL    Albumin 3.7 3.5 - 5.2 g/dL    Bilirubin Total 0.9 <=1.2 mg/dL   Lipase   Result Value Ref Range    Lipase 190 (H) 13 - 60 U/L   Bilirubin direct   Result Value Ref Range    Bilirubin Direct 0.30 0.00 - 0.30 mg/dL   CBC with platelets and differential   Result Value Ref Range    WBC Count 21.5 (H) 4.0 - 11.0 10e3/uL    RBC Count 3.68 (L) 3.80 - 5.20 10e6/uL    Hemoglobin 11.4 (L) 11.7 - 15.7 g/dL    Hematocrit 34.6 (L) 35.0 - 47.0 %    MCV 94 78 - 100 fL    MCH 31.0 26.5 - 33.0 pg    MCHC 32.9 31.5 - 36.5 g/dL    RDW 15.5 (H) 10.0 - 15.0 %    Platelet Count 181 150 - 450 10e3/uL    % Neutrophils 86 %    % Lymphocytes 9 %    % Monocytes 4 %    % Eosinophils 0 %    % Basophils 0 %    % Immature Granulocytes 1 %    NRBCs per 100 WBC 0 <1 /100    Absolute Neutrophils 18.5 (H) 1.6 - 8.3 10e3/uL    Absolute Lymphocytes 1.9 0.8 - 5.3 10e3/uL    Absolute Monocytes 0.9 0.0 - 1.3 10e3/uL    Absolute Eosinophils 0.0 0.0 - 0.7 10e3/uL    Absolute Basophils 0.1 0.0 - 0.2 10e3/uL    Absolute Immature Granulocytes 0.1 <=0.4 10e3/uL    Absolute NRBCs 0.0 10e3/uL   Extra  Tube    Narrative    The following orders were created for panel order Extra Tube.  Procedure                               Abnormality         Status                     ---------                               -----------         ------                     Extra Blue Top Tube[[id number]]                              Final result               Extra Red Top Tube[[id number]]                               Final result               Extra Green Top (Lithium...[[id number]]                      Final result                 Please view results for these tests on the individual orders.   Extra Blue Top Tube   Result Value Ref Range    Hold Specimen JIC    Extra Red Top Tube   Result Value Ref Range    Hold Specimen JIC    Extra Green Top (Lithium Heparin) Tube   Result Value Ref Range    Hold Specimen JIC    Procalcitonin   Result Value Ref Range    Procalcitonin 0.36 (H) <0.05 ng/mL   UA with Microscopic reflex to Culture    Specimen: Urine, Midstream   Result Value Ref Range    Color Urine Yellow Colorless, Straw, Light Yellow, Yellow    Appearance Urine Slightly Cloudy (A) Clear    Glucose Urine Negative Negative mg/dL    Bilirubin Urine Negative Negative    Ketones Urine Trace (A) Negative mg/dL    Specific Gravity Urine 1.029 1.003 - 1.035    Blood Urine Negative Negative    pH Urine 5.0 4.7 - 8.0    Protein Albumin Urine 50 (A) Negative mg/dL    Urobilinogen Urine 2.0 Normal, 2.0 mg/dL    Nitrite Urine Negative Negative    Leukocyte Esterase Urine Small (A) Negative    Bacteria Urine Few (A) None Seen /HPF    Mucus Urine Present (A) None Seen /LPF    RBC Urine 1 <=2 /HPF    WBC Urine 2 <=5 /HPF    Squamous Epithelials Urine 15 (H) <=1 /HPF    Hyaline Casts Urine 21 (H) <=2 /LPF    Narrative    Urine Culture not indicated   Lactic acid whole blood   Result Value Ref Range    Lactic Acid 1.5 0.7 - 2.0 mmol/L   CT Abdomen Pelvis w Contrast   Result Value Ref Range    Radiologist flags Unexplained pneumoperitoneum (AA)      Narrative    PROCEDURE:  CT ABDOMEN PELVIS W CONTRAST    HISTORY: Abdominal pain, ERCP yesterday.    TECHNIQUE: Helical CT of the abdomen and pelvis was performed  following injection of intravenous contrast. This CT exam was  performed using one or more the following dose reduction techniques:  automated exposure control, adjustment of the mA and/or kV according  to patient size, and/or iterative reconstruction technique.    COMPARISON: 2/7/2014, MR 6/12/23    MEDS/CONTRAST: 50 mL Isovue-370    FINDINGS:      Limited images through the lung bases demonstrate cardiomegaly and  mild atelectasis.    There is intra and extrahepatic biliary ductal dilatation. There is a  small volume of free air in the saray hepatis and anterior upper  abdomen, most pronounced surrounding the portal vein and adjacent to  the pancreatic head. The gallbladder is distended with contrast. A  gallbladder stent is present within a loop of small bowel in the deep  pelvis.    The spleen and adrenal glands are unremarkable. A right inferior renal  cortical mass measures 1.8 cm. There is no hydronephrosis. The aorta  is normal in size with scattered atherosclerotic calcifications.    The bowel is normal in caliber. .    Trace free fluid is seen in the pelvis. No suspicious osseous lesions  are identified.      Impression    IMPRESSION:      Peripancreatic/saray hepatis predominant free air. Distended bile  ducts. Severely distended, contrast opacified, gallbladder.  Differential considerations include bile duct or bowel perforation,  possibly related to recent ERCP, as well as perforated gastric or  duodenal ulcer. The patient recently underwent tumor workup. Pathology  results are not available to me. Perforated tumor is also potentially  possible. Correlate with recent surgical findings. Recommend GI  consultation.    A presumed recently placed pancreatic stent is present in a loop of  small bowel in the deep pelvis.    1.8 cm right renal  cortical mass. Consider urology consultation if not  previously performed. Recommend follow-up.    [Critical Result: Unexplained pneumoperitoneum]    Finding was identified on 11/7/2023 8:50 PM.     SAGRARIOGaby Doherty was contacted by me on 11/7/2023 9:02 PM and verbalized  understanding of the critical result.     STAN CAMPBELL MD         SYSTEM ID:  [id number]       Medications   lactated ringers BOLUS 1,000 mL (0 mLs Intravenous Stopped 11/7/23 2030)   iopamidol (ISOVUE-370) solution 61 mL (61 mLs Intravenous $Given 11/7/23 2032)   sodium chloride (PF) 0.9% PF flush 46 mL (50 mLs Intravenous $Given 11/7/23 2032)   HYDROmorphone (PF) (DILAUDID) injection 0.2 mg (0.2 mg Intravenous $Given 11/7/23 2052)   ceFEPIme (MAXIPIME) 2 g vial to attach to  mL bag for ADULTS or 50 mL bag for PEDS (0 g Intravenous Stopped 11/7/23 2153)   metroNIDAZOLE (FLAGYL) infusion 500 mg (0 mg Intravenous Stopped 11/7/23 2207)   vancomycin (VANCOCIN) 1,250 mg in 0.9% NaCl 250 mL intermittent infusion (0 mg Intravenous Stopped 11/7/23 2302)   ondansetron (ZOFRAN) injection 4 mg (4 mg Intravenous $Given 11/7/23 2312)       Assessments & Plan (with Medical Decision Making)     I have reviewed the nursing notes.    I have reviewed the findings, diagnosis, plan and need for follow up with the patient.      Summary:  Patient presents to the ER today for abdominal pain.  Potential diagnosis which have been considered and evaluated include pancreatitis, bowel rupture, UTI, pyelonephritis, appendicitis, as well as others. Many of these have been excluded using the various modalities and assessment as noted on the chart. At the present time, the diagnosis given seems to be the most likely pneumoperitoneum with subsequent intra-abdominal infection likely.  Upon arrival, vitals signs show blood pressure 110/63 with a pulse of 85.  Temperature is 37.3  C.  Respirations 18 with oxygenation 98% on room air.  The patient is alert and oriented but  does have abdominal guarding upon arrival.  Respiratory examination normal.  Does have grade 1/6 systolic murmur noted with irregular rhythm but normal rate.  Generalized abdominal tenderness and guarding with periumbilical area being worst.  No CVA tenderness noted.  No hernia or mass noted to palpation.  No anterior pelvic abnormality.  Lab work obtained showing WBC of 21.5 with hemoglobin 11.4.  With these findings did initiate further sepsis work-up and has procalcitonin of 0.36 and lactic acid of 1.5.  Electrolytes and hepatic functions benign.  Does have BUN of 30.7 with creatinine of 1.36 and GFR of 39 which has worsened since yesterday.  1 L LR initiated.  Blood cultures obtained and are pending.  UA shows small leukocyte esterase but only 2 RBC's and does have 15 squamous epithelial cells making UTI unlikely.  With recent surgical procedure conducted at [place], CT abdomen pelvis with IV contrast was conducted showing pneumoperitoneum at peripancreatic/saray hepatis area for room possible common bile duct perforation or bowel perforation due to recent ERCP or possible gastric/duodenal ulcer.  Pancreatic stent is present in a loop of bowel in the deep pelvis.   Initiated vancomycin, cefepime 2 g, and metronidazole 500 mg for intra-abdominal infection.  Due to hemoperitoneum after recent surgery at [place], contacted [facility] for transfer.  Apparently the meal was on hospital wide divert but general surgeon Dr. Kings Marroquin to call back and discuss case as images were sent.  No callback received so patient handed off to oncoming shift awaiting callback and transfer.        Critical Care Time: None    Impression and plan discussed with patient. Questions answered, concerns addressed, indications for urgent re-evaluation reviewed, and  given. Patient/Parent/Caregiver agree with treatment plan and have no further questions at this time.      This note was created by the Dragon Voice Dictation System. Inadvertent  typographical errors, due to software recognition problems, may still exist.             New Prescriptions    No medications on file       Final diagnoses:   Pneumoperitoneum   Intra-abdominal infection       11/7/2023   [facility]       Jimbo Young, TAO CNP  11/07/23 [id number]

## 2023-11-08 NOTE — ED NOTES
[facility] called back with inability to fly due to weather. [facility] with no availability either.

## 2023-11-08 NOTE — ED NOTES
Face to face report given with opportunity to observe patient.    Report given to EDD Thacker RN   11/7/2023  6:59 PM

## 2023-11-08 NOTE — ED NOTES
Called [facility] back to let them know we were unable to get transport at this time.  Please call with updates  [phone]

## 2023-11-08 NOTE — ED NOTES
No flights --[place], [place]  Checking a few other places [facility], [facility]  [facility] and [facility]  Will be calling back

## 2023-11-08 NOTE — ED TRIAGE NOTES
Had surgery yesterday at the [facility] on pancrease. Had ERCP and 2 scopes yesterday.  Today patient has increase of pain.  Provider at [facility] said to come in and possibly might have pancreatitis.  No fever.

## 2023-11-08 NOTE — ED NOTES
Pt transferred to [facility] via [facility]. Personal belongings and paperwork sent with EMS.

## 2023-11-08 NOTE — ED NOTES
Due to being unable to transport patient to [facility], the provider made some calls and got the patient accepted and Mariel Schul[facility].  Waiting for Room number for transport

## 2023-11-08 NOTE — ED NOTES
Called [facility] back the Provider they are trying to get a hold of is in Trauma Surgery.  Will call back ASAP

## 2023-11-08 NOTE — ED NOTES
[place]  assessed patient in triage and determined patient not Urgent Care appropriate. Will be seen in Emergency Department.

## 2023-11-08 NOTE — ED NOTES
[facility] called to give us an update.  The update was that hopefully they will be calling back in 20 minutes.

## (undated) RX ORDER — HYDROMORPHONE HYDROCHLORIDE 1 MG/ML
INJECTION, SOLUTION INTRAMUSCULAR; INTRAVENOUS; SUBCUTANEOUS
Status: DISPENSED
Start: 2018-09-09

## (undated) RX ORDER — SODIUM CHLORIDE 9 MG/ML
INJECTION, SOLUTION INTRAVENOUS
Status: DISPENSED
Start: 2018-09-09

## (undated) RX ORDER — MAGNESIUM SULFATE HEPTAHYDRATE 40 MG/ML
INJECTION, SOLUTION INTRAVENOUS
Status: DISPENSED
Start: 2018-09-09

## (undated) RX ORDER — ACETAMINOPHEN 325 MG/1
TABLET ORAL
Status: DISPENSED
Start: 2018-09-09

## (undated) RX ORDER — METOCLOPRAMIDE HYDROCHLORIDE 5 MG/ML
INJECTION INTRAMUSCULAR; INTRAVENOUS
Status: DISPENSED
Start: 2018-09-09

## (undated) RX ORDER — ONDANSETRON 2 MG/ML
INJECTION INTRAMUSCULAR; INTRAVENOUS
Status: DISPENSED
Start: 2018-09-09